# Patient Record
Sex: MALE | Race: WHITE | NOT HISPANIC OR LATINO | Employment: OTHER | URBAN - METROPOLITAN AREA
[De-identification: names, ages, dates, MRNs, and addresses within clinical notes are randomized per-mention and may not be internally consistent; named-entity substitution may affect disease eponyms.]

---

## 2017-03-05 ENCOUNTER — GENERIC CONVERSION - ENCOUNTER (OUTPATIENT)
Dept: OTHER | Facility: OTHER | Age: 82
End: 2017-03-05

## 2017-04-03 ENCOUNTER — ALLSCRIPTS OFFICE VISIT (OUTPATIENT)
Dept: OTHER | Facility: OTHER | Age: 82
End: 2017-04-03

## 2017-04-03 DIAGNOSIS — C61 MALIGNANT NEOPLASM OF PROSTATE (HCC): ICD-10-CM

## 2017-04-03 LAB
CLARITY UR: NORMAL
COLOR UR: YELLOW
GLUCOSE (HISTORICAL): NORMAL
HGB UR QL STRIP.AUTO: NORMAL
KETONES UR STRIP-MCNC: NORMAL MG/DL
LEUKOCYTE ESTERASE UR QL STRIP: NORMAL
NITRITE UR QL STRIP: NORMAL
PH UR STRIP.AUTO: 6 [PH]
PROT UR STRIP-MCNC: NORMAL MG/DL
SP GR UR STRIP.AUTO: 1.01

## 2017-04-20 ENCOUNTER — HOSPITAL ENCOUNTER (OUTPATIENT)
Dept: NUCLEAR MEDICINE | Facility: HOSPITAL | Age: 82
Discharge: HOME | End: 2017-04-20
Payer: MEDICARE

## 2017-04-20 ENCOUNTER — HOSPITAL ENCOUNTER (OUTPATIENT)
Dept: CT IMAGING | Facility: HOSPITAL | Age: 82
Discharge: HOME | End: 2017-04-20
Payer: MEDICARE

## 2017-04-20 DIAGNOSIS — C61 MALIGNANT NEOPLASM OF PROSTATE (HCC): ICD-10-CM

## 2017-04-20 PROCEDURE — 78306 BONE IMAGING WHOLE BODY: CPT

## 2017-04-20 PROCEDURE — 74177 CT ABD & PELVIS W/CONTRAST: CPT

## 2017-04-20 PROCEDURE — A9503 TC99M MEDRONATE: HCPCS

## 2017-04-20 PROCEDURE — 71260 CT THORAX DX C+: CPT

## 2017-04-20 RX ADMIN — IOHEXOL 100 ML: 350 INJECTION, SOLUTION INTRAVENOUS at 11:39

## 2017-05-10 ENCOUNTER — ALLSCRIPTS OFFICE VISIT (OUTPATIENT)
Dept: OTHER | Facility: OTHER | Age: 82
End: 2017-05-10

## 2017-05-13 ENCOUNTER — LAB CONVERSION - ENCOUNTER (OUTPATIENT)
Dept: OTHER | Facility: OTHER | Age: 82
End: 2017-05-13

## 2017-05-13 LAB
HBA1C MFR BLD HPLC: 5.6 % OF TOTAL HGB
TSH SERPL DL<=0.05 MIU/L-ACNC: 3.91 MIU/L (ref 0.4–4.5)

## 2017-05-16 ENCOUNTER — ALLSCRIPTS OFFICE VISIT (OUTPATIENT)
Dept: OTHER | Facility: OTHER | Age: 82
End: 2017-05-16

## 2017-07-03 DIAGNOSIS — C61 MALIGNANT NEOPLASM OF PROSTATE (HCC): ICD-10-CM

## 2017-07-18 ENCOUNTER — ALLSCRIPTS OFFICE VISIT (OUTPATIENT)
Dept: OTHER | Facility: OTHER | Age: 82
End: 2017-07-18

## 2017-08-09 ENCOUNTER — ALLSCRIPTS OFFICE VISIT (OUTPATIENT)
Dept: OTHER | Facility: OTHER | Age: 82
End: 2017-08-09

## 2017-08-09 DIAGNOSIS — C61 MALIGNANT NEOPLASM OF PROSTATE (HCC): ICD-10-CM

## 2017-08-10 ENCOUNTER — LAB CONVERSION - ENCOUNTER (OUTPATIENT)
Dept: OTHER | Facility: OTHER | Age: 82
End: 2017-08-10

## 2017-08-10 LAB
A/G RATIO (HISTORICAL): 1.6 (CALC) (ref 1–2.5)
ALBUMIN SERPL BCP-MCNC: 3.9 G/DL (ref 3.6–5.1)
ALP SERPL-CCNC: 106 U/L (ref 40–115)
ALT SERPL W P-5'-P-CCNC: 23 U/L (ref 9–46)
AST SERPL W P-5'-P-CCNC: 22 U/L (ref 10–35)
BASOPHILS # BLD AUTO: 0.8 %
BASOPHILS # BLD AUTO: 42 CELLS/UL (ref 0–200)
BILIRUB SERPL-MCNC: 0.5 MG/DL (ref 0.2–1.2)
BUN SERPL-MCNC: 21 MG/DL (ref 7–25)
BUN/CREA RATIO (HISTORICAL): ABNORMAL (CALC) (ref 6–22)
CALCIUM SERPL-MCNC: 9.2 MG/DL (ref 8.6–10.3)
CHLORIDE SERPL-SCNC: 105 MMOL/L (ref 98–110)
CO2 SERPL-SCNC: 27 MMOL/L (ref 20–31)
CREAT SERPL-MCNC: 0.96 MG/DL (ref 0.7–1.11)
DEPRECATED RDW RBC AUTO: 12.9 % (ref 11–15)
EGFR AFRICAN AMERICAN (HISTORICAL): 86 ML/MIN/1.73M2
EGFR-AMERICAN CALC (HISTORICAL): 74 ML/MIN/1.73M2
EOSINOPHIL # BLD AUTO: 260 CELLS/UL (ref 15–500)
EOSINOPHIL # BLD AUTO: 4.9 %
GAMMA GLOBULIN (HISTORICAL): 2.4 G/DL (CALC) (ref 1.9–3.7)
GLUCOSE (HISTORICAL): 109 MG/DL (ref 65–99)
HCT VFR BLD AUTO: 38.5 % (ref 38.5–50)
HGB BLD-MCNC: 12.7 G/DL (ref 13.2–17.1)
LYMPHOCYTES # BLD AUTO: 1622 CELLS/UL (ref 850–3900)
LYMPHOCYTES # BLD AUTO: 30.6 %
MCH RBC QN AUTO: 30.6 PG (ref 27–33)
MCHC RBC AUTO-ENTMCNC: 33 G/DL (ref 32–36)
MCV RBC AUTO: 92.8 FL (ref 80–100)
MONOCYTES # BLD AUTO: 530 CELLS/UL (ref 200–950)
MONOCYTES (HISTORICAL): 10 %
NEUTROPHILS # BLD AUTO: 2846 CELLS/UL (ref 1500–7800)
NEUTROPHILS # BLD AUTO: 53.7 %
PLATELET # BLD AUTO: 193 THOUSAND/UL (ref 140–400)
PMV BLD AUTO: 9.7 FL (ref 7.5–12.5)
POTASSIUM SERPL-SCNC: 4.7 MMOL/L (ref 3.5–5.3)
RBC # BLD AUTO: 4.15 MILLION/UL (ref 4.2–5.8)
SODIUM SERPL-SCNC: 138 MMOL/L (ref 135–146)
TOTAL PROTEIN (HISTORICAL): 6.3 G/DL (ref 6.1–8.1)
WBC # BLD AUTO: 5.3 THOUSAND/UL (ref 3.8–10.8)

## 2017-08-11 ENCOUNTER — LAB CONVERSION - ENCOUNTER (OUTPATIENT)
Dept: OTHER | Facility: OTHER | Age: 82
End: 2017-08-11

## 2017-08-11 LAB
A/G RATIO (HISTORICAL): 1.6 (CALC) (ref 1–2.5)
ALBUMIN SERPL BCP-MCNC: 3.9 G/DL (ref 3.6–5.1)
ALP SERPL-CCNC: 106 U/L (ref 40–115)
ALT SERPL W P-5'-P-CCNC: 23 U/L (ref 9–46)
AST SERPL W P-5'-P-CCNC: 22 U/L (ref 10–35)
BASOPHILS # BLD AUTO: 0.8 %
BASOPHILS # BLD AUTO: 42 CELLS/UL (ref 0–200)
BILIRUB SERPL-MCNC: 0.5 MG/DL (ref 0.2–1.2)
BUN SERPL-MCNC: 21 MG/DL (ref 7–25)
BUN/CREA RATIO (HISTORICAL): ABNORMAL (CALC) (ref 6–22)
CALCIUM SERPL-MCNC: 9.2 MG/DL (ref 8.6–10.3)
CHLORIDE SERPL-SCNC: 105 MMOL/L (ref 98–110)
CO2 SERPL-SCNC: 27 MMOL/L (ref 20–31)
CREAT SERPL-MCNC: 0.96 MG/DL (ref 0.7–1.11)
DEPRECATED RDW RBC AUTO: 12.9 % (ref 11–15)
EGFR AFRICAN AMERICAN (HISTORICAL): 86 ML/MIN/1.73M2
EGFR-AMERICAN CALC (HISTORICAL): 74 ML/MIN/1.73M2
EOSINOPHIL # BLD AUTO: 260 CELLS/UL (ref 15–500)
EOSINOPHIL # BLD AUTO: 4.9 %
GAMMA GLOBULIN (HISTORICAL): 2.4 G/DL (CALC) (ref 1.9–3.7)
GLUCOSE (HISTORICAL): 109 MG/DL (ref 65–99)
HCT VFR BLD AUTO: 38.5 % (ref 38.5–50)
HGB BLD-MCNC: 12.7 G/DL (ref 13.2–17.1)
LYMPHOCYTES # BLD AUTO: 1622 CELLS/UL (ref 850–3900)
LYMPHOCYTES # BLD AUTO: 30.6 %
MCH RBC QN AUTO: 30.6 PG (ref 27–33)
MCHC RBC AUTO-ENTMCNC: 33 G/DL (ref 32–36)
MCV RBC AUTO: 92.8 FL (ref 80–100)
MONOCYTES # BLD AUTO: 530 CELLS/UL (ref 200–950)
MONOCYTES (HISTORICAL): 10 %
NEUTROPHILS # BLD AUTO: 2846 CELLS/UL (ref 1500–7800)
NEUTROPHILS # BLD AUTO: 53.7 %
PLATELET # BLD AUTO: 193 THOUSAND/UL (ref 140–400)
PMV BLD AUTO: 9.7 FL (ref 7.5–12.5)
POTASSIUM SERPL-SCNC: 4.7 MMOL/L (ref 3.5–5.3)
PROSTATE SPECIFIC ANTIGEN TOTAL (HISTORICAL): 6.9 NG/ML
RBC # BLD AUTO: 4.15 MILLION/UL (ref 4.2–5.8)
SODIUM SERPL-SCNC: 138 MMOL/L (ref 135–146)
TOTAL PROTEIN (HISTORICAL): 6.3 G/DL (ref 6.1–8.1)
WBC # BLD AUTO: 5.3 THOUSAND/UL (ref 3.8–10.8)

## 2017-08-16 ENCOUNTER — HOSPITAL ENCOUNTER (OUTPATIENT)
Dept: INFUSION CENTER | Facility: CLINIC | Age: 82
Discharge: HOME | End: 2017-08-16
Payer: MEDICARE

## 2017-08-16 PROCEDURE — 96401 CHEMO ANTI-NEOPL SQ/IM: CPT

## 2017-08-16 RX ORDER — DORZOLAMIDE HCL 20 MG/ML
1 SOLUTION/ DROPS OPHTHALMIC 3 TIMES DAILY
COMMUNITY

## 2017-08-16 RX ORDER — VIT C/E/CUPERIC/ZINC/LUTEIN 226-90-0.8
CAPSULE ORAL DAILY
COMMUNITY

## 2017-08-16 RX ORDER — ASPIRIN 81 MG/1
81 TABLET ORAL DAILY
COMMUNITY
End: 2018-01-01

## 2017-08-16 RX ORDER — MELATONIN
1000 DAILY
COMMUNITY

## 2017-08-16 RX ORDER — LISINOPRIL 20 MG/1
10 TABLET ORAL DAILY
COMMUNITY
End: 2019-01-01

## 2017-08-16 RX ORDER — TRAVOPROST OPHTHALMIC SOLUTION 0.04 MG/ML
1 SOLUTION OPHTHALMIC
COMMUNITY

## 2017-08-16 RX ORDER — BRINZOLAMIDE 10 MG/ML
1 SUSPENSION/ DROPS OPHTHALMIC 3 TIMES DAILY
COMMUNITY
End: 2018-06-26

## 2017-08-16 RX ORDER — TRIAMCINOLONE ACETONIDE 1 MG/ML
LOTION TOPICAL AS NEEDED
COMMUNITY
End: 2018-06-26 | Stop reason: ALTCHOICE

## 2017-08-16 RX ORDER — BRIMONIDINE TARTRATE, TIMOLOL MALEATE 2; 5 MG/ML; MG/ML
1 SOLUTION/ DROPS OPHTHALMIC EVERY 12 HOURS SCHEDULED
COMMUNITY

## 2017-08-16 RX ORDER — DIPHENOXYLATE HYDROCHLORIDE AND ATROPINE SULFATE 2.5; .025 MG/1; MG/1
1 TABLET ORAL DAILY
COMMUNITY

## 2017-08-16 RX ADMIN — DENOSUMAB 120 MG: 120 INJECTION SUBCUTANEOUS at 10:47

## 2017-08-16 NOTE — PROGRESS NOTES
Pt here for first Xgeva injection  Serum Ca = 9 2  Injection given in L arm  AVS given, pt aware of next appointment

## 2017-08-21 ENCOUNTER — GENERIC CONVERSION - ENCOUNTER (OUTPATIENT)
Dept: OTHER | Facility: OTHER | Age: 82
End: 2017-08-21

## 2017-09-08 ENCOUNTER — LAB CONVERSION - ENCOUNTER (OUTPATIENT)
Dept: OTHER | Facility: OTHER | Age: 82
End: 2017-09-08

## 2017-09-08 LAB
A/G RATIO (HISTORICAL): 1.4 (CALC) (ref 1–2.5)
ALBUMIN SERPL BCP-MCNC: 3.9 G/DL (ref 3.6–5.1)
ALP SERPL-CCNC: 97 U/L (ref 40–115)
ALT SERPL W P-5'-P-CCNC: 23 U/L (ref 9–46)
AST SERPL W P-5'-P-CCNC: 23 U/L (ref 10–35)
BASOPHILS # BLD AUTO: 0.7 %
BASOPHILS # BLD AUTO: 32 CELLS/UL (ref 0–200)
BILIRUB SERPL-MCNC: 0.4 MG/DL (ref 0.2–1.2)
BUN SERPL-MCNC: 20 MG/DL (ref 7–25)
BUN/CREA RATIO (HISTORICAL): ABNORMAL (CALC) (ref 6–22)
CALCIUM SERPL-MCNC: 9.1 MG/DL (ref 8.6–10.3)
CHLORIDE SERPL-SCNC: 107 MMOL/L (ref 98–110)
CO2 SERPL-SCNC: 28 MMOL/L (ref 20–31)
CREAT SERPL-MCNC: 0.89 MG/DL (ref 0.7–1.11)
DEPRECATED RDW RBC AUTO: 12.4 % (ref 11–15)
EGFR AFRICAN AMERICAN (HISTORICAL): 93 ML/MIN/1.73M2
EGFR-AMERICAN CALC (HISTORICAL): 80 ML/MIN/1.73M2
EOSINOPHIL # BLD AUTO: 257 CELLS/UL (ref 15–500)
EOSINOPHIL # BLD AUTO: 5.7 %
GAMMA GLOBULIN (HISTORICAL): 2.7 G/DL (CALC) (ref 1.9–3.7)
GLUCOSE (HISTORICAL): 126 MG/DL (ref 65–99)
HCT VFR BLD AUTO: 40.5 % (ref 38.5–50)
HGB BLD-MCNC: 13.3 G/DL (ref 13.2–17.1)
LYMPHOCYTES # BLD AUTO: 1391 CELLS/UL (ref 850–3900)
LYMPHOCYTES # BLD AUTO: 30.9 %
MCH RBC QN AUTO: 30.4 PG (ref 27–33)
MCHC RBC AUTO-ENTMCNC: 32.8 G/DL (ref 32–36)
MCV RBC AUTO: 92.5 FL (ref 80–100)
MONOCYTES # BLD AUTO: 347 CELLS/UL (ref 200–950)
MONOCYTES (HISTORICAL): 7.7 %
NEUTROPHILS # BLD AUTO: 2475 CELLS/UL (ref 1500–7800)
NEUTROPHILS # BLD AUTO: 55 %
PLATELET # BLD AUTO: 208 THOUSAND/UL (ref 140–400)
PMV BLD AUTO: 10.1 FL (ref 7.5–12.5)
POTASSIUM SERPL-SCNC: 4.2 MMOL/L (ref 3.5–5.3)
PROSTATE SPECIFIC ANTIGEN TOTAL (HISTORICAL): 5.5 NG/ML
RBC # BLD AUTO: 4.38 MILLION/UL (ref 4.2–5.8)
SODIUM SERPL-SCNC: 141 MMOL/L (ref 135–146)
TOTAL PROTEIN (HISTORICAL): 6.6 G/DL (ref 6.1–8.1)
WBC # BLD AUTO: 4.5 THOUSAND/UL (ref 3.8–10.8)

## 2017-09-13 ENCOUNTER — HOSPITAL ENCOUNTER (OUTPATIENT)
Dept: INFUSION CENTER | Facility: CLINIC | Age: 82
Discharge: HOME | End: 2017-09-13
Payer: MEDICARE

## 2017-09-13 PROCEDURE — 96401 CHEMO ANTI-NEOPL SQ/IM: CPT

## 2017-09-13 RX ADMIN — DENOSUMAB 120 MG: 120 INJECTION SUBCUTANEOUS at 10:37

## 2017-09-13 NOTE — PLAN OF CARE
Problem: SAFETY ADULT  Goal: Patient will remain free of falls  INTERVENTIONS:  - Assess patient frequently for physical needs  -  Identify cognitive and physical deficits and behaviors that affect risk of falls    -  [place] fall precautions as indicated by assessment   - Educate patient/family on patient safety including physical limitations  - Instruct patient to call for assistance with activity based on assessment  - Modify environment to reduce risk of injury  - Consider OT/PT consult to assist with strengthening/mobility  Outcome: Progressing    Goal: Maintain or return to baseline ADL function  INTERVENTIONS:  -  Assess patient's ability to carry out ADLs; assess patient's baseline for ADL function and identify physical deficits which impact ability to perform ADLs (bathing, care of mouth/teeth, toileting, grooming, dressing, etc )  - Assess/evaluate cause of self-care deficits   - Assess range of motion  - Assess patient's mobility; develop plan if impaired  - Assess patient's need for assistive devices and provide as appropriate  - Encourage maximum independence but intervene and supervise when necessary  ¯ Involve family in performance of ADLs  ¯ Assess for home care needs following discharge   ¯ Request OT consult to assist with ADL evaluation and planning for discharge  ¯ Provide patient education as appropriate  Outcome: Progressing    Goal: Maintain or return mobility status to optimal level  INTERVENTIONS:  - Assess patient's baseline mobility status (ambulation, transfers, stairs, etc )    - Identify cognitive and physical deficits and behaviors that affect mobility  - Identify mobility aids required to assist with transfers and/or ambulation (gait belt, sit-to-stand, lift, walker, cane, etc )  - [place] fall precautions as indicated by assessment  - Record patient progress and toleration of activity level on Mobility SBAR; progress patient to next Phase/Stage  - Instruct patient to call for assistance with activity based on assessment  - Request Rehabilitation consult to assist with strengthening/weightbearing, etc   Outcome: Progressing

## 2017-09-13 NOTE — PROGRESS NOTES
Pt received Xgeva without complications  Denies any jaw/mouth issues  Denies recent or scheduled dental work  Aware of future appointments

## 2017-09-20 ENCOUNTER — ALLSCRIPTS OFFICE VISIT (OUTPATIENT)
Dept: OTHER | Facility: OTHER | Age: 82
End: 2017-09-20

## 2017-10-01 DIAGNOSIS — I12.9 HYPERTENSIVE CHRONIC KIDNEY DISEASE WITH STAGE 1 THROUGH STAGE 4 CHRONIC KIDNEY DISEASE, OR UNSPECIFIED CHRONIC KIDNEY DISEASE: ICD-10-CM

## 2017-10-01 DIAGNOSIS — R79.89 OTHER SPECIFIED ABNORMAL FINDINGS OF BLOOD CHEMISTRY: ICD-10-CM

## 2017-10-01 DIAGNOSIS — E78.1 PURE HYPERGLYCERIDEMIA: ICD-10-CM

## 2017-10-01 DIAGNOSIS — N18.2 CHRONIC KIDNEY DISEASE, STAGE II (MILD): ICD-10-CM

## 2017-10-01 DIAGNOSIS — R73.09 OTHER ABNORMAL GLUCOSE: ICD-10-CM

## 2017-10-01 DIAGNOSIS — I10 ESSENTIAL (PRIMARY) HYPERTENSION: ICD-10-CM

## 2017-10-01 DIAGNOSIS — C61 MALIGNANT NEOPLASM OF PROSTATE (HCC): ICD-10-CM

## 2017-10-05 ENCOUNTER — LAB CONVERSION - ENCOUNTER (OUTPATIENT)
Dept: OTHER | Facility: OTHER | Age: 82
End: 2017-10-05

## 2017-10-05 LAB
A/G RATIO (HISTORICAL): 1.5 (CALC) (ref 1–2.5)
ALBUMIN SERPL BCP-MCNC: 4.1 G/DL (ref 3.6–5.1)
ALP SERPL-CCNC: 76 U/L (ref 40–115)
ALT SERPL W P-5'-P-CCNC: 23 U/L (ref 9–46)
AST SERPL W P-5'-P-CCNC: 21 U/L (ref 10–35)
BASOPHILS # BLD AUTO: 0.7 %
BASOPHILS # BLD AUTO: 30 CELLS/UL (ref 0–200)
BILIRUB SERPL-MCNC: 0.5 MG/DL (ref 0.2–1.2)
BUN SERPL-MCNC: 22 MG/DL (ref 7–25)
BUN/CREA RATIO (HISTORICAL): ABNORMAL (CALC) (ref 6–22)
CALCIUM SERPL-MCNC: 9.1 MG/DL (ref 8.6–10.3)
CHLORIDE SERPL-SCNC: 106 MMOL/L (ref 98–110)
CHOLEST SERPL-MCNC: 231 MG/DL
CHOLEST/HDLC SERPL: 5.4 (CALC)
CO2 SERPL-SCNC: 28 MMOL/L (ref 20–31)
CREAT SERPL-MCNC: 0.93 MG/DL (ref 0.7–1.11)
DEPRECATED RDW RBC AUTO: 12.4 % (ref 11–15)
EGFR AFRICAN AMERICAN (HISTORICAL): 89 ML/MIN/1.73M2
EGFR-AMERICAN CALC (HISTORICAL): 77 ML/MIN/1.73M2
EOSINOPHIL # BLD AUTO: 280 CELLS/UL (ref 15–500)
EOSINOPHIL # BLD AUTO: 6.5 %
GAMMA GLOBULIN (HISTORICAL): 2.7 G/DL (CALC) (ref 1.9–3.7)
GLUCOSE (HISTORICAL): 104 MG/DL (ref 65–99)
HBA1C MFR BLD HPLC: 5.6 % OF TOTAL HGB
HCT VFR BLD AUTO: 39.5 % (ref 38.5–50)
HDLC SERPL-MCNC: 43 MG/DL
HGB BLD-MCNC: 13.3 G/DL (ref 13.2–17.1)
LYMPHOCYTES # BLD AUTO: 1273 CELLS/UL (ref 850–3900)
LYMPHOCYTES # BLD AUTO: 29.6 %
MCH RBC QN AUTO: 31.1 PG (ref 27–33)
MCHC RBC AUTO-ENTMCNC: 33.7 G/DL (ref 32–36)
MCV RBC AUTO: 92.3 FL (ref 80–100)
MONOCYTES # BLD AUTO: 391 CELLS/UL (ref 200–950)
MONOCYTES (HISTORICAL): 9.1 %
NEUTROPHILS # BLD AUTO: 2326 CELLS/UL (ref 1500–7800)
NEUTROPHILS # BLD AUTO: 54.1 %
NON-HDL-CHOL (CHOL-HDL) (HISTORICAL): 188 MG/DL (CALC)
PLATELET # BLD AUTO: 182 THOUSAND/UL (ref 140–400)
PMV BLD AUTO: 9.8 FL (ref 7.5–12.5)
POTASSIUM SERPL-SCNC: 4.7 MMOL/L (ref 3.5–5.3)
PROSTATE SPECIFIC ANTIGEN TOTAL (HISTORICAL): 5.8 NG/ML
RBC # BLD AUTO: 4.28 MILLION/UL (ref 4.2–5.8)
SODIUM SERPL-SCNC: 139 MMOL/L (ref 135–146)
T4 FREE SERPL-MCNC: 1.2 NG/DL (ref 0.8–1.8)
TOTAL PROTEIN (HISTORICAL): 6.8 G/DL (ref 6.1–8.1)
TRIGL SERPL-MCNC: 435 MG/DL
TSH SERPL DL<=0.05 MIU/L-ACNC: 4.16 MIU/L (ref 0.4–4.5)
WBC # BLD AUTO: 4.3 THOUSAND/UL (ref 3.8–10.8)

## 2017-10-11 ENCOUNTER — HOSPITAL ENCOUNTER (OUTPATIENT)
Dept: INFUSION CENTER | Facility: CLINIC | Age: 82
Discharge: HOME | End: 2017-10-11
Payer: MEDICARE

## 2017-10-11 VITALS
HEART RATE: 67 BPM | DIASTOLIC BLOOD PRESSURE: 85 MMHG | TEMPERATURE: 98.1 F | SYSTOLIC BLOOD PRESSURE: 149 MMHG | RESPIRATION RATE: 18 BRPM

## 2017-10-11 PROCEDURE — 96401 CHEMO ANTI-NEOPL SQ/IM: CPT

## 2017-10-11 RX ADMIN — DENOSUMAB 120 MG: 120 INJECTION SUBCUTANEOUS at 13:24

## 2017-10-11 NOTE — PROGRESS NOTES
Patient's Calcium from 10/4/17 is 9 1 which meets parameters for Xgeva, patient denies any recent dental problems or procedures scheduled  Patient declines AVS today, aware of next appointment

## 2017-10-17 ENCOUNTER — GENERIC CONVERSION - ENCOUNTER (OUTPATIENT)
Dept: OTHER | Facility: OTHER | Age: 82
End: 2017-10-17

## 2017-10-20 ENCOUNTER — ALLSCRIPTS OFFICE VISIT (OUTPATIENT)
Dept: OTHER | Facility: OTHER | Age: 82
End: 2017-10-20

## 2017-10-20 ENCOUNTER — GENERIC CONVERSION - ENCOUNTER (OUTPATIENT)
Dept: OTHER | Facility: OTHER | Age: 82
End: 2017-10-20

## 2017-11-01 DIAGNOSIS — C61 MALIGNANT NEOPLASM OF PROSTATE (HCC): ICD-10-CM

## 2017-11-02 ENCOUNTER — LAB CONVERSION - ENCOUNTER (OUTPATIENT)
Dept: OTHER | Facility: OTHER | Age: 82
End: 2017-11-02

## 2017-11-02 LAB
A/G RATIO (HISTORICAL): 1.6 (CALC) (ref 1–2.5)
ALBUMIN SERPL BCP-MCNC: 4.1 G/DL (ref 3.6–5.1)
ALP SERPL-CCNC: 64 U/L (ref 40–115)
ALT SERPL W P-5'-P-CCNC: 26 U/L (ref 9–46)
AST SERPL W P-5'-P-CCNC: 22 U/L (ref 10–35)
BILIRUB SERPL-MCNC: 0.5 MG/DL (ref 0.2–1.2)
BUN SERPL-MCNC: 20 MG/DL (ref 7–25)
BUN/CREA RATIO (HISTORICAL): NORMAL (CALC) (ref 6–22)
CALCIUM SERPL-MCNC: 9 MG/DL (ref 8.6–10.3)
CHLORIDE SERPL-SCNC: 108 MMOL/L (ref 98–110)
CO2 SERPL-SCNC: 26 MMOL/L (ref 20–31)
CREAT SERPL-MCNC: 0.97 MG/DL (ref 0.7–1.11)
EGFR AFRICAN AMERICAN (HISTORICAL): 85 ML/MIN/1.73M2
EGFR-AMERICAN CALC (HISTORICAL): 73 ML/MIN/1.73M2
GAMMA GLOBULIN (HISTORICAL): 2.6 G/DL (CALC) (ref 1.9–3.7)
GLUCOSE (HISTORICAL): 99 MG/DL (ref 65–99)
POTASSIUM SERPL-SCNC: 4.8 MMOL/L (ref 3.5–5.3)
SODIUM SERPL-SCNC: 141 MMOL/L (ref 135–146)
TOTAL PROTEIN (HISTORICAL): 6.7 G/DL (ref 6.1–8.1)

## 2017-11-03 ENCOUNTER — LAB CONVERSION - ENCOUNTER (OUTPATIENT)
Dept: OTHER | Facility: OTHER | Age: 82
End: 2017-11-03

## 2017-11-03 LAB
A/G RATIO (HISTORICAL): 1.6 (CALC) (ref 1–2.5)
ALBUMIN SERPL BCP-MCNC: 4.1 G/DL (ref 3.6–5.1)
ALP SERPL-CCNC: 64 U/L (ref 40–115)
ALT SERPL W P-5'-P-CCNC: 26 U/L (ref 9–46)
AST SERPL W P-5'-P-CCNC: 22 U/L (ref 10–35)
BASOPHILS # BLD AUTO: 0.4 %
BASOPHILS # BLD AUTO: 18 CELLS/UL (ref 0–200)
BILIRUB SERPL-MCNC: 0.5 MG/DL (ref 0.2–1.2)
BUN SERPL-MCNC: 20 MG/DL (ref 7–25)
BUN/CREA RATIO (HISTORICAL): NORMAL (CALC) (ref 6–22)
CALCIUM SERPL-MCNC: 9 MG/DL (ref 8.6–10.3)
CHLORIDE SERPL-SCNC: 108 MMOL/L (ref 98–110)
CO2 SERPL-SCNC: 26 MMOL/L (ref 20–31)
CREAT SERPL-MCNC: 0.97 MG/DL (ref 0.7–1.11)
DEPRECATED RDW RBC AUTO: 12.6 % (ref 11–15)
EGFR AFRICAN AMERICAN (HISTORICAL): 85 ML/MIN/1.73M2
EGFR-AMERICAN CALC (HISTORICAL): 73 ML/MIN/1.73M2
EOSINOPHIL # BLD AUTO: 221 CELLS/UL (ref 15–500)
EOSINOPHIL # BLD AUTO: 4.8 %
GAMMA GLOBULIN (HISTORICAL): 2.6 G/DL (CALC) (ref 1.9–3.7)
GLUCOSE (HISTORICAL): 99 MG/DL (ref 65–99)
HCT VFR BLD AUTO: 38.9 % (ref 38.5–50)
HGB BLD-MCNC: 13.3 G/DL (ref 13.2–17.1)
LYMPHOCYTES # BLD AUTO: 1546 CELLS/UL (ref 850–3900)
LYMPHOCYTES # BLD AUTO: 33.6 %
MCH RBC QN AUTO: 31.6 PG (ref 27–33)
MCHC RBC AUTO-ENTMCNC: 34.2 G/DL (ref 32–36)
MCV RBC AUTO: 92.4 FL (ref 80–100)
MONOCYTES # BLD AUTO: 460 CELLS/UL (ref 200–950)
MONOCYTES (HISTORICAL): 10 %
NEUTROPHILS # BLD AUTO: 2355 CELLS/UL (ref 1500–7800)
NEUTROPHILS # BLD AUTO: 51.2 %
PLATELET # BLD AUTO: 200 THOUSAND/UL (ref 140–400)
PMV BLD AUTO: 10.2 FL (ref 7.5–12.5)
POTASSIUM SERPL-SCNC: 4.8 MMOL/L (ref 3.5–5.3)
PROSTATE SPECIFIC ANTIGEN TOTAL (HISTORICAL): 5.6 NG/ML
RBC # BLD AUTO: 4.21 MILLION/UL (ref 4.2–5.8)
SODIUM SERPL-SCNC: 141 MMOL/L (ref 135–146)
TOTAL PROTEIN (HISTORICAL): 6.7 G/DL (ref 6.1–8.1)
WBC # BLD AUTO: 4.6 THOUSAND/UL (ref 3.8–10.8)

## 2017-11-08 ENCOUNTER — HOSPITAL ENCOUNTER (OUTPATIENT)
Dept: INFUSION CENTER | Facility: CLINIC | Age: 82
Discharge: HOME | End: 2017-11-08
Payer: MEDICARE

## 2017-11-08 VITALS
DIASTOLIC BLOOD PRESSURE: 91 MMHG | RESPIRATION RATE: 16 BRPM | TEMPERATURE: 97.4 F | SYSTOLIC BLOOD PRESSURE: 165 MMHG | HEART RATE: 58 BPM

## 2017-11-08 PROCEDURE — 96402 CHEMO HORMON ANTINEOPL SQ/IM: CPT

## 2017-11-08 RX ADMIN — DENOSUMAB 120 MG: 120 INJECTION SUBCUTANEOUS at 13:33

## 2017-11-08 NOTE — PROGRESS NOTES
Ca level 9 0  Denies any recent or scheduled dental work and jaw issues  Received Xgeva without complications  Refused AVS  Aware of future appointments

## 2017-12-01 ENCOUNTER — LAB CONVERSION - ENCOUNTER (OUTPATIENT)
Dept: OTHER | Facility: OTHER | Age: 82
End: 2017-12-01

## 2017-12-01 LAB
A/G RATIO (HISTORICAL): 1.5 (CALC) (ref 1–2.5)
ALBUMIN SERPL BCP-MCNC: 3.9 G/DL (ref 3.6–5.1)
ALP SERPL-CCNC: 67 U/L (ref 40–115)
ALT SERPL W P-5'-P-CCNC: 19 U/L (ref 9–46)
AST SERPL W P-5'-P-CCNC: 18 U/L (ref 10–35)
BASOPHILS # BLD AUTO: 0.7 %
BASOPHILS # BLD AUTO: 31 CELLS/UL (ref 0–200)
BILIRUB SERPL-MCNC: 0.5 MG/DL (ref 0.2–1.2)
BUN SERPL-MCNC: 20 MG/DL (ref 7–25)
BUN/CREA RATIO (HISTORICAL): ABNORMAL (CALC) (ref 6–22)
CALCIUM SERPL-MCNC: 8.7 MG/DL (ref 8.6–10.3)
CHLORIDE SERPL-SCNC: 106 MMOL/L (ref 98–110)
CO2 SERPL-SCNC: 22 MMOL/L (ref 20–31)
CREAT SERPL-MCNC: 0.82 MG/DL (ref 0.7–1.11)
DEPRECATED RDW RBC AUTO: 12.6 % (ref 11–15)
EGFR AFRICAN AMERICAN (HISTORICAL): 96 ML/MIN/1.73M2
EGFR-AMERICAN CALC (HISTORICAL): 83 ML/MIN/1.73M2
EOSINOPHIL # BLD AUTO: 233 CELLS/UL (ref 15–500)
EOSINOPHIL # BLD AUTO: 5.3 %
GAMMA GLOBULIN (HISTORICAL): 2.6 G/DL (CALC) (ref 1.9–3.7)
GLUCOSE (HISTORICAL): 156 MG/DL (ref 65–99)
HCT VFR BLD AUTO: 38.2 % (ref 38.5–50)
HGB BLD-MCNC: 12.8 G/DL (ref 13.2–17.1)
LYMPHOCYTES # BLD AUTO: 1377 CELLS/UL (ref 850–3900)
LYMPHOCYTES # BLD AUTO: 31.3 %
MCH RBC QN AUTO: 31.3 PG (ref 27–33)
MCHC RBC AUTO-ENTMCNC: 33.5 G/DL (ref 32–36)
MCV RBC AUTO: 93.4 FL (ref 80–100)
MONOCYTES # BLD AUTO: 282 CELLS/UL (ref 200–950)
MONOCYTES (HISTORICAL): 6.4 %
NEUTROPHILS # BLD AUTO: 2477 CELLS/UL (ref 1500–7800)
NEUTROPHILS # BLD AUTO: 56.3 %
PLATELET # BLD AUTO: 200 THOUSAND/UL (ref 140–400)
PMV BLD AUTO: 10.1 FL (ref 7.5–12.5)
POTASSIUM SERPL-SCNC: 4.1 MMOL/L (ref 3.5–5.3)
PROSTATE SPECIFIC ANTIGEN TOTAL (HISTORICAL): 5 NG/ML
RBC # BLD AUTO: 4.09 MILLION/UL (ref 4.2–5.8)
SODIUM SERPL-SCNC: 137 MMOL/L (ref 135–146)
TOTAL PROTEIN (HISTORICAL): 6.5 G/DL (ref 6.1–8.1)
WBC # BLD AUTO: 4.4 THOUSAND/UL (ref 3.8–10.8)

## 2017-12-06 ENCOUNTER — HOSPITAL ENCOUNTER (OUTPATIENT)
Dept: INFUSION CENTER | Facility: CLINIC | Age: 82
Discharge: HOME | End: 2017-12-06
Payer: MEDICARE

## 2017-12-06 VITALS
RESPIRATION RATE: 18 BRPM | SYSTOLIC BLOOD PRESSURE: 167 MMHG | HEART RATE: 57 BPM | TEMPERATURE: 96.6 F | DIASTOLIC BLOOD PRESSURE: 94 MMHG

## 2017-12-06 PROCEDURE — 96401 CHEMO ANTI-NEOPL SQ/IM: CPT

## 2017-12-06 RX ADMIN — DENOSUMAB 120 MG: 120 INJECTION SUBCUTANEOUS at 14:02

## 2017-12-20 ENCOUNTER — ALLSCRIPTS OFFICE VISIT (OUTPATIENT)
Dept: OTHER | Facility: OTHER | Age: 82
End: 2017-12-20

## 2017-12-21 NOTE — PROGRESS NOTES
Assessment   1  Prostate cancer (185) (C61)   2  Bone metastases (198 5) (C79 51)    Plan   Prostate cancer    · Drink plenty of fluids ; Status:Complete;   Done: 46BIS0787   Ordered; For:Prostate cancer; Ordered By:Proothi, Carlos;   · (1) CBC/PLT/DIFF; Status:Active; Requested [id number]; Perform:[facility]; QND:90HYJ9646; Last Updated By:Silvia Madrid; 2017 9:56:45 AM;Ordered; For:Prostate cancer; Ordered By:Proothi, Carlos;   · (1) CBC/PLT/DIFF; Status:Active; Requested for:01Ost7527; Perform:[facility]; RRX:21CTY5443; Last Updated By:Silvia Madrid; 2017 9:55:56 AM;Ordered; For:Prostate cancer; Ordered By:Proothi, Carlos;   · (1) CBC/PLT/DIFF; Status:Active; Requested for:2018; Perform:[facility]; IZW:60YXZ9006; Last Updated By:Silvia Madrid; 2017 9:56:08 AM;Ordered; For:Prostate cancer; Ordered By:Proothi, Carlos;   · (1) CBC/PLT/DIFF; Status:Active; Requested for:32Obw3221; Perform:[facility]; TVA:80WHS8401; Last Updated By:Silvia Madrid; 2017 9:56:31 AM;Ordered; For:Prostate cancer; Ordered By:Proothi, Carlos;   · (1) CBC/PLT/DIFF; Status:Active; Requested VJV:29HDV5061; Perform:[facility]; EDITH:48ZFV1854; Last Updated By:Silvia Madrid; 2017 9:55:36 AM;Ordered; For:Prostate cancer; Ordered By:Proothi, Carlos;   · (1) CBC/PLT/DIFF; Status:Active; Requested for:2018; Perform:[facility]; Due:2019; Last Updated By:Silvia Madrid; 2017 9:56:19 AM;Ordered; For:Prostate cancer; Ordered By:Carlos Spain;   · (1) CBC/PLT/DIFF; Status:Complete; Requested for:Recurring Schedule: 2018;    2018; 3/26/2018; 2018; 2018; 2018 ; Perform:[facility]; QMQ:87LJV1831;EUSUUZO; For:Prostate cancer; Ordered By:Carlos Spain;   · (1) COMPREHENSIVE METABOLIC PANEL; Status:Active; Requested DLS:[id number];     Perform:[facility]; VAD:22QDA0480; Last Updated By:Yumi Madrid; 12/20/2017 9:56:45 AM;Ordered; For:Prostate cancer; Ordered By:Proothi, Carlos;   · (1) COMPREHENSIVE METABOLIC PANEL; Status:Active; Requested for:08Rcn1187; Perform:[facility]; XMB:81ESF4724; Last Updated By:Yumi Madrid; 12/20/2017 9:55:56 AM;Ordered; For:Prostate cancer; Ordered By:Proothi, Carlos;   · (1) COMPREHENSIVE METABOLIC PANEL; Status:Active; Requested for:26Mar2018; Perform:[facility]; ZPB:62LCE8381; Last Updated By:Yumi Madrid; 12/20/2017 9:56:08 AM;Ordered; For:Prostate cancer; Ordered By:Proothi, Carlos;   · (1) COMPREHENSIVE METABOLIC PANEL; Status:Active; Requested for:88Xbr0707; Perform:[facility]; BRQ:64JUM3286; Last Updated By:Yumi Madrid; 12/20/2017 9:56:31 AM;Ordered; For:Prostate cancer; Ordered By:Proothi, Carlos;   · (1) COMPREHENSIVE METABOLIC PANEL; Status:Active; Requested HWY:[id number]; Perform:[facility]; CIELO:14BGH9355; Last Updated By:Yumi Madrid; 12/20/2017 9:55:36 AM;Ordered; For:Prostate cancer; Ordered By:Proothi, Carlos;   · (1) COMPREHENSIVE METABOLIC PANEL; Status:Active; Requested for:30Apr2018; Perform:[facility]; Due:30Apr2019; Last Updated By:Yumi Madrid; 12/20/2017 9:56:19 AM;Ordered; For:Prostate cancer; Ordered By:Proothi, Carlos;   · (1) COMPREHENSIVE METABOLIC PANEL; Status:Complete; Requested for:Recurring    Schedule: 1/29/2018; 2/26/2018; 3/26/2018; 4/30/2018; 5/28/2018; 6/25/2018 ; Perform:[facility]; DXQ:83OQQ4455;UYYWXFN; For:Prostate cancer; Ordered By:Carlos Spain;   · Follow-up visit in 3 months Evaluation and Treatment  Follow-up  Status: Complete     Done: 34ETU6612 09:20AM   Ordered; For: Prostate cancer; Ordered By: Rick Vences Performed:  Due: 80YYH8310; Last Updated By: Korin Darby; 12/20/2017 9:45:32 AM    Discussion/Summary   Discussion Summary:       Pedrito follows with his urologist   examination and test results are as recorded and discussed  Patient has extensive bony metastases from prostate cancer  He does not have bone pains  Lupron Depot was recently started in April 2017  Haily De Anda PSA has come down to 5 0    He is responding to hormone therapy  was what else could have been be added to hormonal therapy  Previous studies showed Lupron plus Taxotere prolonged survival  New study showed Lupron plus ZYtiga +prednisone improved survival  Patient had decided not to have any additional therapy to hormonal therapy and he still feels the same way  He will continue to have bone strengthening medication Xgeva  He is already taking calcium and vitamin D  He will reserve other medications for later  We discussed Xgeva versus Zometa  We discussed once a month versus once every 3 months Xgeva versus Zometa  No study on Xgeva every 3 month  Patient would like to stay with Florina Mcgowan and he will take once every 3 months  He would prefer not to be switched to Zometa  Condition discussed and explained  Questions answered  will continue to Lupron shot from his urologist     Counseling Documentation With Imm: The patient, patient's family was counseled regarding diagnostic results,-- prognosis,-- patient and family education,-- impressions  total time of encounter was 30 minutes-- and-- 20 minutes was spent counseling  Goals and Barriers: The patient has the current Goals: To treat prostate cancer with metastasis  The patent has the current Barriers: No barriers  Patient's Capacity to Self-Care: Patient is able to Self-Care  Medication SE Review and Pt Understands Tx: Possible side effects of new medications were reviewed with the patient/guardian today  The treatment plan was reviewed with the patient/guardian   The patient/guardian understands and agrees with the treatment plan    Self Referrals:    Self Referrals: No      Chief Complaint   Chief Complaint Free Text Note Form: Cancer of prostate and extensive bone metastases  History of Present Illness   HPI: Patient is here with his wife  In 2011 patient was diagnosed to have cancer of the prostate and he had IMRT and ADT   this year in 2017 patient was found to have stage IV cancer prostate with extensive bony metastatic disease  In April 2017 he was started on Lupron depot injections once every 6 months  PSA Started to come down  He did not complain of bone pains    He was not interested in adding another medication like Zytiga plus prednisone or xofigo at this time  He went on X Geva with calcium and vitamin D  PSA has come down further to 5 0 from 9  He does not offer any symptom at this time  Georgette Ormond He follows with his urologist       Review of Systems             No headaches, seizures, diplopia, dysphagia, hoarseness, angina pain, chest pain, palpitations, shortness of breath, cough, hemoptysis, abdominal pain, melena, or hematuria  No fever, chills, bleeding, bone pains, skin rash, weight loss, nausea, vomiting and no change in bowel habits  Appetite is fair  No night sweats  Patient has minor arthritic symptoms  No leg cramps  Not unusually sensitive to heat or cold  No recent or frequent infections  Reviewed 13 systems  Other symptoms as in history of present illness  ROS Reviewed:    ROS reviewed  Active Problems   1  Abnormal TSH (790 6) (R94 6)   2  Acquired hypertriglyceridemia (272 1) (E78 1)   3  Benign essential hypertension (401 1) (I10)   4  Benign hypertensive CKD, stage 1-4 or unspecified chronic kidney disease (403 10)     (I12 9)   5  Bone metastases (198 5) (C79 51)   6  CKD (chronic kidney disease) stage 2, GFR 60-89 ml/min (585 2) (N18 2)   7  Decreased hearing, bilateral (389 9) (H91 93)   8  Eczema, unspecified type (692 9) (L30 9)   9  Encounter for preventive health examination (V70 0) (Z00 00)   10  Encounter for screening colonoscopy (V76 51) (Z12 11)   11  Flu vaccine need (V04 81) (Z23)   12   Glaucoma (365  9) (H40 9)   13  Hyperlipidemia, acquired (272 4) (E78 5)   14  Macular degeneration (362 50) (H35 30)   15  Metastatic disease (199 1) (C79 9)   16  Need for influenza vaccination (V04 81) (Z23)   17  Need for pneumococcal vaccine (V03 82) (Z23)   18  Need for Tdap vaccination (V06 1) (Z23)   19  Obesity (278 00) (E66 9)   20  Other abnormal glucose (790 29) (R73 09)   21  Prostate cancer (185) (C61)    Past Medical History   1  History of acute bronchitis (V12 69) (Z87 09)   2  History of urinary frequency (V13 09) (Z87 898)   3  History of Prostate Cancer (V10 46)   4  History of Urinary Tract Infection (V13 02)                Reviewed  Surgical History   1  History of Cholecystectomy   2  History of Colonoscopy (Fiberoptic)                Reviewed       Family History   Mother    1  Family history of Breast Cancer (V16 3)  Father    2  Family history of Prostate Cancer (V16 42)  Brother    3  Family history of Prostate Cancer (V16 42)  Family History    4  Family history of cerebrovascular accident (CVA) (V17 1) (Z82 3)   5  Family history of hypertension (V17 49) (Z82 49)                Reviewed       Social History    · Being A Social Drinker   · Former smoker (I25 05) (R16 416)                Reviewed       Current Meds    1  Adult Aspirin EC Low Strength 81 MG Oral Tablet Delayed Release Recorded   2  Combigan 0 2-0 5 % Ophthalmic Solution; Therapy: (Scott Webb) to Recorded   3  Daily Multiple Vitamins TABS Recorded   4  Dorzolamide HCl - 2 % Ophthalmic Solution; Therapy: (Scott Webb) to Recorded   5  Lisinopril 20 MG Oral Tablet; TAKE 1 TABLET DAILY FOR BLOOD PRESSURE; Therapy: 58OSA2667 to (Evaluate:19Oct2018)  Requested for: 26Oct2017; Last     QX:77LEE3902 Ordered   6  Metoprolol Tartrate 25 MG Oral Tablet; TAKE 1 TABLET EVERY 12 HOURS;      Therapy: 52KSK4522 to [id number])  Requested for: 08KEN0587; Last     GI:68KTZ5811; Status: ACTIVE - Transmit to Pharmacy - Awaiting Verification Ordered   7  PreserVision/Lutein CAPS; Therapy: (Mickey Brick) to Recorded   8  Travatan Z 0 004 % Ophthalmic Solution; Therapy: 70Gnf7067 to (Last Rx:20Apr2011)  Requested for: 20Apr2011 Ordered   9  Triamcinolone Acetonide 0 1 % External Cream; APPLY  AND RUB  IN A THIN FILM TO     AFFECTED AREAS TWICE DAILY  (AM AND PM); Therapy: 90OUW7398 to (Evaluate:31Oct2017)  Requested for: 26Oct2017; Last     Rx:24Oct2017; Status: ACTIVE - Transmit to Pharmacy - Awaiting Verification Ordered    Allergies   1  No Known Drug Allergies                Reviewed       Vitals   Vital Signs    Recorded: 20Dec2017 09:19AM   Temperature 97 6 F   Heart Rate 75   Respiration 18   Systolic 235   Diastolic 70   Height 5 ft 11 in   Weight 260 lb 8 oz   BMI Calculated 36 33   BSA Calculated 2 36   O2 Saturation 97   Pain Scale 0                 Reviewed       Physical Exam             Patient is alert and oriented and not in  distress  Stable vital signs  No icterus  No oral thrush  No palpable neck mass  Lung fields are clear to percussion and auscultation  Heart rate is regular  Systolic murmur  Abdomen soft and nontender  No palpable abdominal mass  No ascites  No edema of ankles  No calf tenderness  No focal neurological deficit  No skin rash  No palpable lymphadenopathy  Good arterial pulses  No clubbing  Anxious  Performance status one             ECOG 1       Results/Data   (1) COMPREHENSIVE METABOLIC PANEL 82MMD5772 76:59XY Proothi, Carlos      Test Name Result Flag Reference   GLUCOSE 156 mg/dL H 65-99   Fasting reference interval           For someone without known diabetes, a glucose     value >125 mg/dL indicates that they may have     diabetes and this should be confirmed with a     follow-up test    UREA NITROGEN (BUN) 20 mg/dL  7-25   CREATININE 0 82 mg/dL  0 70-1 11   For patients >52years of age, the reference limit     for Creatinine is approximately 13% higher for people     identified as -American  eGFR NON-AFR  AMERICAN 83 mL/min/1 73m2  > OR = 60   eGFR AFRICAN AMERICAN 96 mL/min/1 73m2  > OR = 60   BUN/CREATININE RATIO   7-52   NOT APPLICABLE (calc)   SODIUM 137 mmol/L  135-146   POTASSIUM 4 1 mmol/L  3 5-5 3   CHLORIDE 106 mmol/L     CARBON DIOXIDE 22 mmol/L  20-31   CALCIUM 8 7 mg/dL  8 6-10 3   PROTEIN, TOTAL 6 5 g/dL  6 1-8 1   ALBUMIN 3 9 g/dL  3 6-5 1   GLOBULIN 2 6 g/dL (calc)  1 9-3 7   ALBUMIN/GLOBULIN RATIO 1 5 (calc)  1 0-2 5   BILIRUBIN, TOTAL 0 5 mg/dL  0 2-1 2   ALKALINE PHOSPHATASE 67 U/L     AST 18 U/L  10-35   ALT 19 U/L  9-46      (1) CBC/PLT/DIFF 22ZXG9464 08:54AM Proothi, Carlos      Test Name Result Flag Reference   WHITE BLOOD CELL COUNT 4 4 Thousand/uL  3 8-10 8   RED BLOOD CELL COUNT 4 09 Million/uL L 4 20-5 80   HEMOGLOBIN 12 8 g/dL L 13 2-17 1   HEMATOCRIT 38 2 % L 38 5-50 0   MCV 93 4 fL  80 0-100 0   MCH 31 3 pg  27 0-33 0   MCHC 33 5 g/dL  32 0-36 0   RDW 12 6 %  11 0-15 0   PLATELET COUNT 887 Thousand/uL  140-400   ABSOLUTE NEUTROPHILS 2477 cells/uL  8701-3460   ABSOLUTE LYMPHOCYTES 1377 cells/uL  850-3900   ABSOLUTE MONOCYTES 282 cells/uL  200-950   ABSOLUTE EOSINOPHILS 233 cells/uL     ABSOLUTE BASOPHILS 31 cells/uL  0-200   NEUTROPHILS 56 3 %     LYMPHOCYTES 31 3 %     MONOCYTES 6 4 %     EOSINOPHILS 5 3 %     BASOPHILS 0 7 %     MPV 10 1 fL  7 5-12 5      (1) PSA (SCREEN) (Dx V76 44 Screen for Prostate Cancer) 82KXW0912 08:54AM Proothi, Carlos   REPORT COMMENT:     FASTING:YES      Test Name Result Flag Reference   PSA, TOTAL 5 0 ng/mL H < OR = 4 0   The total PSA value from this assay system is      standardized against the WHO standard  The test      result will be approximately 20% lower when compared      to the equimolar-standardized total PSA ([employer])  Comparison of serial PSA results should be      interpreted with this fact in mind             This test was performed using the Siemens chemiluminescent method  Values obtained from      different assay methods cannot be used     interchangeably  PSA levels, regardless of     value, should not be interpreted as absolute     evidence of the presence or absence of disease  Health Management   Encounter for screening colonoscopy   COLONOSCOPY; every 5 years; Last 10Oct2006; Next Due: 62LFY6831; Overdue    Future Appointments      Date/Time Provider Specialty Site   04/24/2018 09:15 AM KEENAN Angela   Urology [facility]   03/21/2018 09:20 AM Tadeo Spain MD Hematology [facility]   41/67/4848 39:61 PM Chris Heaton, [address] [place] [address]   01/17/2018 09:00 AM Rendall Face Urology [facility] UROLOGY  [place]     Signatures    Electronically signed by : Seth Rojas MD; Dec 20 2017  5:19PM EST                       (Author)

## 2017-12-27 DIAGNOSIS — C61 MALIGNANT NEOPLASM OF PROSTATE (HCC): ICD-10-CM

## 2017-12-28 ENCOUNTER — LAB CONVERSION - ENCOUNTER (OUTPATIENT)
Dept: OTHER | Facility: OTHER | Age: 82
End: 2017-12-28

## 2017-12-28 LAB
A/G RATIO (HISTORICAL): 1.3 (CALC) (ref 1–2.5)
ALBUMIN SERPL BCP-MCNC: 3.9 G/DL (ref 3.6–5.1)
ALP SERPL-CCNC: 75 U/L (ref 40–115)
ALT SERPL W P-5'-P-CCNC: 22 U/L (ref 9–46)
AST SERPL W P-5'-P-CCNC: 23 U/L (ref 10–35)
BASOPHILS # BLD AUTO: 0.9 %
BASOPHILS # BLD AUTO: 50 CELLS/UL (ref 0–200)
BILIRUB SERPL-MCNC: 0.5 MG/DL (ref 0.2–1.2)
BUN SERPL-MCNC: 22 MG/DL (ref 7–25)
BUN/CREA RATIO (HISTORICAL): ABNORMAL (CALC) (ref 6–22)
CALCIUM SERPL-MCNC: 9.4 MG/DL (ref 8.6–10.3)
CHLORIDE SERPL-SCNC: 106 MMOL/L (ref 98–110)
CO2 SERPL-SCNC: 28 MMOL/L (ref 20–31)
CREAT SERPL-MCNC: 0.98 MG/DL (ref 0.7–1.11)
DEPRECATED RDW RBC AUTO: 13 % (ref 11–15)
EGFR AFRICAN AMERICAN (HISTORICAL): 83 ML/MIN/1.73M2
EGFR-AMERICAN CALC (HISTORICAL): 72 ML/MIN/1.73M2
EOSINOPHIL # BLD AUTO: 252 CELLS/UL (ref 15–500)
EOSINOPHIL # BLD AUTO: 4.5 %
GAMMA GLOBULIN (HISTORICAL): 3.1 G/DL (CALC) (ref 1.9–3.7)
GLUCOSE (HISTORICAL): 109 MG/DL (ref 65–99)
HCT VFR BLD AUTO: 40.1 % (ref 38.5–50)
HGB BLD-MCNC: 13.8 G/DL (ref 13.2–17.1)
LYMPHOCYTES # BLD AUTO: 1943 CELLS/UL (ref 850–3900)
LYMPHOCYTES # BLD AUTO: 34.7 %
MCH RBC QN AUTO: 32.2 PG (ref 27–33)
MCHC RBC AUTO-ENTMCNC: 34.4 G/DL (ref 32–36)
MCV RBC AUTO: 93.5 FL (ref 80–100)
MONOCYTES # BLD AUTO: 543 CELLS/UL (ref 200–950)
MONOCYTES (HISTORICAL): 9.7 %
NEUTROPHILS # BLD AUTO: 2811 CELLS/UL (ref 1500–7800)
NEUTROPHILS # BLD AUTO: 50.2 %
PLATELET # BLD AUTO: 217 THOUSAND/UL (ref 140–400)
PMV BLD AUTO: 10.3 FL (ref 7.5–12.5)
POTASSIUM SERPL-SCNC: 4.8 MMOL/L (ref 3.5–5.3)
PROSTATE SPECIFIC ANTIGEN TOTAL (HISTORICAL): 6 NG/ML
RBC # BLD AUTO: 4.29 MILLION/UL (ref 4.2–5.8)
SODIUM SERPL-SCNC: 141 MMOL/L (ref 135–146)
TOTAL PROTEIN (HISTORICAL): 7 G/DL (ref 6.1–8.1)
WBC # BLD AUTO: 5.6 THOUSAND/UL (ref 3.8–10.8)

## 2018-01-01 ENCOUNTER — TELEPHONE (OUTPATIENT)
Dept: HEMATOLOGY ONCOLOGY | Facility: CLINIC | Age: 83
End: 2018-01-01

## 2018-01-01 ENCOUNTER — HOSPITAL ENCOUNTER (EMERGENCY)
Facility: HOSPITAL | Age: 83
Discharge: HOME | End: 2018-12-28
Attending: EMERGENCY MEDICINE
Payer: MEDICARE

## 2018-01-01 ENCOUNTER — OFFICE VISIT (OUTPATIENT)
Dept: HEMATOLOGY ONCOLOGY | Facility: CLINIC | Age: 83
End: 2018-01-01
Payer: MEDICARE

## 2018-01-01 ENCOUNTER — TELEPHONE (OUTPATIENT)
Dept: HEMATOLOGY ONCOLOGY | Facility: MEDICAL CENTER | Age: 83
End: 2018-01-01

## 2018-01-01 ENCOUNTER — HOSPITAL ENCOUNTER (OUTPATIENT)
Dept: INFUSION CENTER | Facility: CLINIC | Age: 83
End: 2018-01-01

## 2018-01-01 VITALS
SYSTOLIC BLOOD PRESSURE: 159 MMHG | RESPIRATION RATE: 18 BRPM | TEMPERATURE: 98.9 F | DIASTOLIC BLOOD PRESSURE: 78 MMHG | OXYGEN SATURATION: 98 % | HEART RATE: 89 BPM

## 2018-01-01 VITALS
HEIGHT: 70 IN | RESPIRATION RATE: 18 BRPM | OXYGEN SATURATION: 97 % | TEMPERATURE: 99 F | WEIGHT: 234.2 LBS | BODY MASS INDEX: 33.53 KG/M2 | DIASTOLIC BLOOD PRESSURE: 64 MMHG | HEART RATE: 97 BPM | SYSTOLIC BLOOD PRESSURE: 122 MMHG

## 2018-01-01 DIAGNOSIS — C61 PROSTATE CANCER (HCC): Primary | ICD-10-CM

## 2018-01-01 DIAGNOSIS — D69.6 THROMBOCYTOPENIA (HCC): ICD-10-CM

## 2018-01-01 DIAGNOSIS — C79.51 BONE METASTASES (HCC): ICD-10-CM

## 2018-01-01 DIAGNOSIS — D61.89 ANEMIA DUE TO OTHER BONE MARROW FAILURE (HCC): Primary | ICD-10-CM

## 2018-01-01 DIAGNOSIS — C79.9 METASTATIC DISEASE (HCC): ICD-10-CM

## 2018-01-01 DIAGNOSIS — I10 ESSENTIAL HYPERTENSION, BENIGN: Primary | ICD-10-CM

## 2018-01-01 DIAGNOSIS — D64.9 ANEMIA: Primary | ICD-10-CM

## 2018-01-01 LAB
ABO GROUP BLD BPU: NORMAL
ABO GROUP BLD BPU: NORMAL
ABO GROUP BLD: NORMAL
ALBUMIN SERPL-MCNC: 3.4 G/DL (ref 3.6–5.1)
ALBUMIN/GLOB SERPL: 1.4 (CALC) (ref 1–2.5)
ALP SERPL-CCNC: 450 U/L (ref 40–115)
ALT SERPL-CCNC: 32 U/L (ref 9–46)
AST SERPL-CCNC: 41 U/L (ref 10–35)
BASOPHILS # BLD AUTO: 126 CELLS/UL (ref 0–200)
BASOPHILS NFR BLD AUTO: 3 %
BILIRUB SERPL-MCNC: 0.8 MG/DL (ref 0.2–1.2)
BLD GP AB SCN SERPL QL: NEGATIVE
BPU ID: NORMAL
BPU ID: NORMAL
BUN SERPL-MCNC: 15 MG/DL (ref 7–25)
BUN/CREAT SERPL: 22 (CALC) (ref 6–22)
CALCIUM SERPL-MCNC: 8 MG/DL (ref 8.6–10.3)
CHLORIDE SERPL-SCNC: 105 MMOL/L (ref 98–110)
CO2 SERPL-SCNC: 24 MMOL/L (ref 20–32)
CREAT SERPL-MCNC: 0.68 MG/DL (ref 0.7–1.11)
EOSINOPHIL # BLD AUTO: 126 CELLS/UL (ref 15–500)
EOSINOPHIL NFR BLD AUTO: 3 %
ERYTHROCYTE [DISTWIDTH] IN BLOOD BY AUTOMATED COUNT: 19.2 % (ref 11–15)
GLOBULIN SER CALC-MCNC: 2.5 G/DL (CALC) (ref 1.9–3.7)
GLUCOSE SERPL-MCNC: 122 MG/DL (ref 65–139)
HCT VFR BLD AUTO: 20.9 % (ref 38.5–50)
HGB BLD-MCNC: 7 G/DL (ref 13.2–17.1)
LYMPHOCYTES # BLD MANUAL: 1092 CELLS/UL (ref 850–3900)
LYMPHOCYTES NFR BLD AUTO: 26 %
MCH RBC QN AUTO: 31.7 PG (ref 27–33)
MCHC RBC AUTO-ENTMCNC: 33.5 G/DL (ref 32–36)
MCV RBC AUTO: 94.6 FL (ref 80–100)
METAMYELOCYTES # BLD: 168 CELLS/UL
METAMYELOCYTES NFR BLD MANUAL: 4 %
MONOCYTES # BLD AUTO: 714 CELLS/UL (ref 200–950)
MONOCYTES NFR BLD AUTO: 17 %
NEUTROPHILS # BLD AUTO: 1848 CELLS/UL (ref 1500–7800)
NEUTROPHILS NFR BLD AUTO: 44 %
NEUTS BAND # BLD: 126 CELLS/UL (ref 0–750)
NEUTS BAND NFR BLD MANUAL: 3 %
NRBC # BLD: 588 CELLS/UL
NRBC BLD-RTO: 14 /100 WBC
PLATELET # BLD AUTO: 18 THOUSAND/UL (ref 140–400)
PMV BLD REES-ECKER: 12.6 FL (ref 7.5–12.5)
POTASSIUM SERPL-SCNC: 4.7 MMOL/L (ref 3.5–5.3)
PROT SERPL-MCNC: 5.9 G/DL (ref 6.1–8.1)
PSA SERPL-MCNC: 253.9 NG/ML
RBC # BLD AUTO: 2.21 MILLION/UL (ref 4.2–5.8)
RH BLD: POSITIVE
SL AMB EGFR AFRICAN AMERICAN: 103 ML/MIN/1.73M2
SL AMB EGFR NON AFRICAN AMERICAN: 89 ML/MIN/1.73M2
SODIUM SERPL-SCNC: 136 MMOL/L (ref 135–146)
SPECIMEN EXPIRATION DATE: NORMAL
UNIT DISPENSE STATUS: NORMAL
UNIT DISPENSE STATUS: NORMAL
UNIT PRODUCT CODE: NORMAL
UNIT PRODUCT CODE: NORMAL
UNIT RH: NORMAL
UNIT RH: NORMAL
WBC # BLD AUTO: 4.2 THOUSAND/UL (ref 3.8–10.8)

## 2018-01-01 PROCEDURE — 86923 COMPATIBILITY TEST ELECTRIC: CPT

## 2018-01-01 PROCEDURE — 36415 COLL VENOUS BLD VENIPUNCTURE: CPT | Performed by: EMERGENCY MEDICINE

## 2018-01-01 PROCEDURE — 86900 BLOOD TYPING SEROLOGIC ABO: CPT | Performed by: EMERGENCY MEDICINE

## 2018-01-01 PROCEDURE — P9021 RED BLOOD CELLS UNIT: HCPCS

## 2018-01-01 PROCEDURE — 36430 TRANSFUSION BLD/BLD COMPNT: CPT

## 2018-01-01 PROCEDURE — 86850 RBC ANTIBODY SCREEN: CPT | Performed by: EMERGENCY MEDICINE

## 2018-01-01 PROCEDURE — 99283 EMERGENCY DEPT VISIT LOW MDM: CPT

## 2018-01-01 PROCEDURE — 99214 OFFICE O/P EST MOD 30 MIN: CPT | Performed by: INTERNAL MEDICINE

## 2018-01-01 PROCEDURE — 86901 BLOOD TYPING SEROLOGIC RH(D): CPT | Performed by: EMERGENCY MEDICINE

## 2018-01-01 PROCEDURE — P9037 PLATE PHERES LEUKOREDU IRRAD: HCPCS

## 2018-01-01 RX ORDER — BICALUTAMIDE 50 MG/1
50 TABLET, FILM COATED ORAL 3 TIMES DAILY
COMMUNITY
End: 2019-01-01 | Stop reason: ALTCHOICE

## 2018-01-03 ENCOUNTER — HOSPITAL ENCOUNTER (OUTPATIENT)
Dept: INFUSION CENTER | Facility: CLINIC | Age: 83
Discharge: HOME | End: 2018-01-05
Payer: MEDICARE

## 2018-01-09 ENCOUNTER — GENERIC CONVERSION - ENCOUNTER (OUTPATIENT)
Dept: OTHER | Facility: OTHER | Age: 83
End: 2018-01-09

## 2018-01-12 VITALS
DIASTOLIC BLOOD PRESSURE: 80 MMHG | WEIGHT: 256 LBS | HEART RATE: 80 BPM | BODY MASS INDEX: 35.7 KG/M2 | SYSTOLIC BLOOD PRESSURE: 140 MMHG

## 2018-01-12 NOTE — MISCELLANEOUS
Message  Receive an email from Lucia Hernandez on 8/18/17 that pt wants to wait until Oct OV to decide about treatment  Spoke with pt and does want to wait  Informed Dr Catarino Shaver  Called pt and asked if wants a sooner appt to discuss treatment with Dr Catarino Shaver but declined and will keep Oct appt  Active Problems    1  Abnormal TSH (790 6) (R94 6)   2  Acquired hypertriglyceridemia (272 1) (E78 1)   3  Benign essential hypertension (401 1) (I10)   4  Benign hypertensive CKD, stage 1-4 or unspecified chronic kidney disease (403 10)   (I12 9)   5  Bone metastases (198 5) (C79 51)   6  CKD (chronic kidney disease) stage 2, GFR 60-89 ml/min (585 2) (N18 2)   7  Decreased hearing, bilateral (389 9) (H91 93)   8  Eczema, unspecified type (692 9) (L30 9)   9  Encounter for preventive health examination (V70 0) (Z00 00)   10  Encounter for screening colonoscopy (V76 51) (Z12 11)   11  Glaucoma (365 9) (H40 9)   12  Macular degeneration (362 50) (H35 30)   13  Metastatic disease (199 1) (C79 9)   14  Need for influenza vaccination (V04 81) (Z23)   15  Need for pneumococcal vaccine (V03 82) (Z23)   16  Need for Tdap vaccination (V06 1) (Z23)   17  Obesity (278 00) (E66 9)   18  Other abnormal glucose (790 29) (R73 09)   19  Prostate cancer (185) (C61)    Current Meds   1  Adult Aspirin EC Low Strength 81 MG Oral Tablet Delayed Release Recorded   2  Combigan 0 2-0 5 % Ophthalmic Solution; Therapy: (Janna Lamp) to Recorded   3  Daily Multiple Vitamins TABS Recorded   4  Dorzolamide HCl - 2 % Ophthalmic Solution; Therapy: (Janna Lamp) to Recorded   5  Lisinopril 20 MG Oral Tablet; TAKE 1 TABLET DAILY FOR BLOOD PRESSURE; Therapy: 08EUW7448 to (Aden Carlos)  Requested for: 31IPK8681; Last   Rx:10Nov2016 Ordered   6  Metoprolol Tartrate 25 MG Oral Tablet; TAKE 1 TABLET EVERY 12 HOURS; Therapy: 43OQR7220 to (Aden Carlos)  Requested for: 54DDR6918; Last   Rx:10Nov2016 Ordered   7  PreserVision/Lutein CAPS; Therapy: (Andrea Borja) to Recorded   8  Travatan Z 0 004 % Ophthalmic Solution; Therapy: 20Apr2011 to (Last Rx:20Apr2011)  Requested for: 20Apr2011 Ordered    Allergies    1   No Known Drug Allergies    Signatures   Electronically signed by : Veronica Norman RN; Aug 21 2017  9:37AM EST                       (Author)

## 2018-01-13 VITALS
SYSTOLIC BLOOD PRESSURE: 132 MMHG | WEIGHT: 254.5 LBS | BODY MASS INDEX: 35.63 KG/M2 | HEART RATE: 72 BPM | HEIGHT: 71 IN | DIASTOLIC BLOOD PRESSURE: 78 MMHG

## 2018-01-13 VITALS
HEART RATE: 72 BPM | SYSTOLIC BLOOD PRESSURE: 132 MMHG | DIASTOLIC BLOOD PRESSURE: 76 MMHG | RESPIRATION RATE: 17 BRPM | WEIGHT: 259.38 LBS | TEMPERATURE: 97.4 F | BODY MASS INDEX: 36.31 KG/M2 | HEIGHT: 71 IN | OXYGEN SATURATION: 97 %

## 2018-01-13 VITALS
HEIGHT: 71 IN | SYSTOLIC BLOOD PRESSURE: 200 MMHG | HEART RATE: 60 BPM | BODY MASS INDEX: 35.19 KG/M2 | WEIGHT: 251.38 LBS | DIASTOLIC BLOOD PRESSURE: 88 MMHG

## 2018-01-14 VITALS
WEIGHT: 258.25 LBS | RESPIRATION RATE: 16 BRPM | OXYGEN SATURATION: 97 % | HEIGHT: 71 IN | SYSTOLIC BLOOD PRESSURE: 138 MMHG | DIASTOLIC BLOOD PRESSURE: 70 MMHG | TEMPERATURE: 98.4 F | BODY MASS INDEX: 36.15 KG/M2 | HEART RATE: 71 BPM

## 2018-01-14 VITALS
WEIGHT: 251.5 LBS | HEIGHT: 71 IN | SYSTOLIC BLOOD PRESSURE: 134 MMHG | BODY MASS INDEX: 35.21 KG/M2 | DIASTOLIC BLOOD PRESSURE: 82 MMHG

## 2018-01-22 VITALS
WEIGHT: 262.13 LBS | HEIGHT: 71 IN | DIASTOLIC BLOOD PRESSURE: 64 MMHG | BODY MASS INDEX: 36.7 KG/M2 | SYSTOLIC BLOOD PRESSURE: 108 MMHG

## 2018-01-22 VITALS
DIASTOLIC BLOOD PRESSURE: 70 MMHG | SYSTOLIC BLOOD PRESSURE: 130 MMHG | WEIGHT: 263 LBS | HEART RATE: 60 BPM | BODY MASS INDEX: 36.68 KG/M2

## 2018-01-23 VITALS
DIASTOLIC BLOOD PRESSURE: 70 MMHG | WEIGHT: 260.5 LBS | OXYGEN SATURATION: 97 % | BODY MASS INDEX: 36.47 KG/M2 | SYSTOLIC BLOOD PRESSURE: 142 MMHG | HEIGHT: 71 IN | RESPIRATION RATE: 18 BRPM | TEMPERATURE: 97.6 F | HEART RATE: 75 BPM

## 2018-01-23 NOTE — MISCELLANEOUS
Message   Recorded as Task   Date: 01/09/2018 01:40 PM, Created By: Claire Osorio   Task Name: Call Back   Assigned To: Melissa Toro   Regarding Patient: Jakub Decker, Status: Active   Comment:    Aida Morel - 09 Jan 2018 1:40 PM     TASK CREATED  pt called and was told to call our office if he was to get any dental work done  He is getting a tooth extraction within the next week or two and needs feedback    [id number] - 09 Jan 2018 3:42 PM     TASK EDITED  Patient had his last xgeva injection on 12/6/17  Called and spoke with the patient and made him aware that he can proceed with scheduling his tooth extraction for the week of 1/15/18  Patient will call the office and give the exact date of the tooth extraction, so the next xgeva dose can be given 1 month after the dental work to avoid osteonecrosis of the jaw  Called [facility] and made them aware that patient would be going for a tooth extraction the week of 1/15/18  Active Problems    1  Abnormal TSH (790 6) (R94 6)   2  Acquired hypertriglyceridemia (272 1) (E78 1)   3  Benign essential hypertension (401 1) (I10)   4  Benign hypertensive CKD, stage 1-4 or unspecified chronic kidney disease (403 10)   (I12 9)   5  Bone metastases (198 5) (C79 51)   6  CKD (chronic kidney disease) stage 2, GFR 60-89 ml/min (585 2) (N18 2)   7  Decreased hearing, bilateral (389 9) (H91 93)   8  Eczema, unspecified type (692 9) (L30 9)   9  Encounter for preventive health examination (V70 0) (Z00 00)   10  Encounter for screening colonoscopy (V76 51) (Z12 11)   11  Flu vaccine need (V04 81) (Z23)   12  Glaucoma (365 9) (H40 9)   13  Hyperlipidemia, acquired (272 4) (E78 5)   14  Macular degeneration (362 50) (H35 30)   15  Metastatic disease (199 1) (C79 9)   16  Need for influenza vaccination (V04 81) (Z23)   17  Need for pneumococcal vaccine (V03 82) (Z23)   18  Need for Tdap vaccination (V06 1) (Z23)   19  Obesity (278 00) (E66 9)   20  Other abnormal glucose (790 29) (R73 09)   21  Prostate cancer (185) (C61)    Current Meds   1  Adult Aspirin EC Low Strength 81 MG Oral Tablet Delayed Release Recorded   2  Combigan 0 2-0 5 % Ophthalmic Solution; Therapy: (Blank Mano) to Recorded   3  Daily Multiple Vitamins TABS Recorded   4  Dorzolamide HCl - 2 % Ophthalmic Solution; Therapy: (Blank Mano) to Recorded   5  Lisinopril 20 MG Oral Tablet; TAKE 1 TABLET DAILY FOR BLOOD PRESSURE; Therapy: 38ZUG9365 to (Evaluate:19Oct2018)  Requested for: 26Oct2017; Last   XM:95ZXR9234 Ordered   6  Metoprolol Tartrate 25 MG Oral Tablet; TAKE 1 TABLET EVERY 12 HOURS; Therapy: 86OCX3283 to [id number])  Requested for: 0499 52 06 34; Last   Rx:24Oct2017; Status: ACTIVE - Transmit to Pharmacy - Awaiting Verification Ordered   7  PreserVision/Lutein CAPS; Therapy: (Tauna Fling) to Recorded   8  Travatan Z 0 004 % Ophthalmic Solution; Therapy: 13Oum7009 to (Last Rx:54Gug9550)  Requested for: 20Apr2011 Ordered   9  Triamcinolone Acetonide 0 1 % External Cream; APPLY  AND RUB  IN A THIN FILM TO   AFFECTED AREAS TWICE DAILY  (AM AND PM); Therapy: 22VMF4986 to (Evaluate:31Oct2017)  Requested for: 26Oct2017; Last   Rx:24Oct2017; Status: ACTIVE - Transmit to Pharmacy - Awaiting Verification Ordered    Allergies    1  No Known Drug Allergies    Signatures   Electronically signed by :  Miguel Angel Rob RN; Jan 9 2018  3:42PM EST                       (Author)

## 2018-01-24 NOTE — PROGRESS NOTES
Assessment   1  Encounter for preventive health examination (V70 0) (Z00 00)    Plan  Abnormal TSH, Acquired hypertriglyceridemia, Benign essential hypertension    · (1) BASIC METABOLIC PROFILE; Status:Active - Retrospective By Protocol  Authorization; Requested for:2018; Abnormal TSH, Acquired hypertriglyceridemia, Benign essential hypertension, Benign  hypertensive CKD, stage 1-4 or unspecified chronic kidney disease,  Obesity    · (1) TSH; Status:Active - Retrospective By Protocol Authorization; Requested  for:2018; Abnormal TSH, Benign essential hypertension, Benign hypertensive CKD, stage 1-4 or  unspecified chronic kidney disease, Obesity    · (1) T4, FREE; Status:Active - Retrospective By Protocol Authorization; Requested  for:2018; Acquired hypertriglyceridemia, Benign essential hypertension, Benign hypertensive  CKD, stage 1-4 or unspecified chronic kidney disease,  Obesity    · (1) LIPID PANEL, FASTING; Status:Active - Retrospective By Protocol Authorization; Requested for:2018; Flu vaccine need    · Administered: Fluzone High-Dose 0 5 ML Intramuscular Suspension Prefilled Syringe    Discussion/Summary    AWV completed  1  Impression:1  Subsequent Annual Wellness Visit1   Cardiovascular screening and counselin  screening is current1   Diabetes screening and counselin  screening is current1   Colorectal cancer screening and counselin  screening not indicated1   Prostate cancer screening and counselin  has prostate CA w mets1   Osteoporosis screening and counselin  the risks and benefits of screening were discussed1  and screening is current1   Abdominal aortic aneurysm screening and counselin  screening not indicated1   Glaucoma screening and counselin  screening is current1   HIV screening and counselin  screening not indicated1   Advance Directive Plannin  complete and up to date1    Patient Discussion:1  follow-up visit needed in 6m1         1 Amended By: Morgan Flanagan; Oct 20 2017 2:04 PM EST    Chief Complaint  AWV - blue folder given  pete parker      History of Present Illness  HPI: AWV  see 2nd prob note  1    Welcome to [employer] and Wellness Visits: The patient is being seen for the initial annual wellness visit  Medicare Screening and Risk Factors   Hospitalizations: no previous hospitalizations  Medicare Screening Tests Risk Questions   Drug and Alcohol Use: The patient is a former cigarette smoker  The patient reports drinking 1 drinks per day  He has never used illicit drugs  Diet and Physical Activity: Current diet includes well balanced meals, limited junk food, 1 servings of fruit per day, 1 servings of vegetables per day, 1 servings of meat per day, 1 servings of simple carbohydrates per day, 1 servings of dairy products per day, 2 cups of coffee per day, 1 cans of diet soda per day and 2 glasses water daily  He exercises infrequently  Exercise: walking 15 minutes per day  Mood Disorder and Cognitive Impairment Screening:   Depression screening  negative for symptoms  He denies feeling down, depressed, or hopeless over the past two weeks  He denies feeling little interest or pleasure in doing things over the past two weeks  Cognitive impairment screening: denies difficulty learning/retaining new information, denies difficulty handling complex tasks, denies difficulty with reasoning, denies difficulty with spatial ability and orientation, denies difficulty with language and denies difficulty with behavior  Functional Ability/Level of Safety: Hearing is slightly decreased, significantly decreased in the right ear and slightly decreased in the left ear   Activities of daily living details: does not need help using the phone, no transportation help needed, does not need help shopping, no meal preparation help needed, does not need help doing housework, does not need help doing laundry, does not need help managing medications and does not need help managing money  Fall risk factors: The patient fell 0 times in the past 12 months  Home safety risk factors:  no unfamiliar surroundings, no loose rugs, no poor household lighting, no uneven floors, no household clutter, grab bars in the bathroom and handrails on the stairs  Advance Directives: Advance directives: living will, durable power of  for health care directives and advance directives  Co-Managers and Medical Equipment/Suppliers: See Patient Care Team   Reviewed Updated [address] [place] [address] [address]:   Last Medicare Wellness Visit Information was reviewed, patient interviewed, no change since last AWV1   Preventive Quality Program 65 and Older: Falls Risk: The patient fell 0 times in the past 12 months  The patient is currently asymptomatic Symptoms Include: The patient currently has no urinary incontinence symptoms  Date of last glaucoma screen was 04/2017       1 Amended By: Dawson Hanson; Oct 20 2017 2:02 PM EST    Patient Care Team    Care Team Member Role Specialty Office Number   [facility] [phone]   Jessica HAQUE  Urology [phone]   Fish Mercado MD  Ophthalmology [phone]   Rocky Dawson MD  Radiation Oncology [phone]   Swapna Covarrubias MD Specialist Hematology Oncology [phone]     Review of Systems    Constitutional:1  negative1   Eyes:1  negative1   ENT:1  negative1   Cardiovascular:1  negative1   Respiratory:1  negative1   Gastrointestinal:1  negative1   Genitourinary:1  no dysuria1   Musculoskeletal:1  no diffuse joint pain1   Integumentary and Breasts:1  no rashes1   Psychiatric:1  negative1   Endocrine:1  negative1   Hematologic and Lymphatic:1  negative1         1 Amended By: Dawson Hanson; Oct 20 2017 2:03 PM EST    Active Problems   1  Abnormal TSH (790 6) (R94 6)  2  Acquired hypertriglyceridemia (272 1) (E78 1)  3  Benign essential hypertension (401 1) (I10)  4   Benign hypertensive CKD, stage 1-4 or unspecified chronic kidney disease (403 10)   (I12 9)  5  Bone metastases (198 5) (C79 51)  6  CKD (chronic kidney disease) stage 2, GFR 60-89 ml/min (585 2) (N18 2)  7  Decreased hearing, bilateral (389 9) (H91 93)  8  Eczema, unspecified type (692 9) (L30 9)  9  Encounter for preventive health examination (V70 0) (Z00 00)  10  Encounter for screening colonoscopy (V76 51) (Z12 11)  11  Glaucoma (365 9) (H40 9)  12  Macular degeneration (362 50) (H35 30)  13  Metastatic disease (199 1) (C79 9)  14  Need for influenza vaccination (V04 81) (Z23)  15  Need for pneumococcal vaccine (V03 82) (Z23)  16  Need for Tdap vaccination (V06 1) (Z23)  17  Obesity (278 00) (E66 9)  18  Other abnormal glucose (790 29) (R73 09)  19  Prostate cancer (185) (C61)    Past Medical History    · History of acute bronchitis (V12 69) (Z87 09)   · History of urinary frequency (V13 09) (Z87 898)   · History of Prostate Cancer (V10 46)   · History of Urinary Tract Infection (V13 02)    Surgical History    · History of Cholecystectomy   · History of Colonoscopy (Fiberoptic)    Family History  Mother    · Family history of Breast Cancer (V16 3)  Father    · Family history of Prostate Cancer (V16 42)  Brother    · Family history of Prostate Cancer (V16 42)  Family History    · Family history of cerebrovascular accident (CVA) (V17 1) (Z82 3)   · Family history of hypertension (V17 49) (Z82 49)    Social History    · Being A Social Drinker   · Former smoker (F27 90) (M78 002)   · quit in his 20's    Current Meds  1  Adult Aspirin EC Low Strength 81 MG Oral Tablet Delayed Release Recorded  2  Combigan 0 2-0 5 % Ophthalmic Solution; Therapy: (Minerva Moore) to Recorded  3  Daily Multiple Vitamins TABS Recorded  4  Dorzolamide HCl - 2 % Ophthalmic Solution; Therapy: (Minerva Moore) to Recorded  5  Lisinopril 20 MG Oral Tablet; TAKE 1 TABLET DAILY FOR BLOOD PRESSURE;    Therapy: 05JYH0910 to (Mari Michelo)  Requested for: 98KPF1878; Last   Rx:10Nov2016 Ordered  6  Metoprolol Tartrate 25 MG Oral Tablet; TAKE 1 TABLET EVERY 12 HOURS; Therapy: 21SDF5168 to (Etha Manda)  Requested for: 90KME3980; Last   Rx:10Nov2016 Ordered  7  PreserVision/Lutein CAPS; Therapy: (Raffaele Champagne) to Recorded  8  Travatan Z 0 004 % Ophthalmic Solution; Therapy: 45Hcs2792 to (Last Rx:20Apr2011)  Requested for: 57Put4518 Ordered    Allergies   1  No Known Drug Allergies    Immunizations   1 2 3    Influenza  11-Oct-2012 17-Oct-2013 05-Nov-2015    PCV  21-Jun-2016      PPSV  16-Jan-2012      Tdap  Permanently Deferred: Medical Deferral, Insurance does not cover  , [id number]      Zoster  06-Dec-2012       Vitals  Signs    Systolic: 954  Diastolic: 64  Height: 5 ft 11 in  Weight: 262 lb 2 oz  BMI Calculated: 36 56  BSA Calculated: 2 37    Results/Data  Prime MD Depression Screening 20Oct2017 11:25AM User, Ahs     Test Name Result Flag Reference   PRIME-MD Depression Screening 0/9 - Likely not MD     Depressed Mood: No  Loss of Interest: No     PHQ-2 Adult Depression Screening 20Oct2017 11:24AM User, Ahs     Test Name Result Flag Reference   PHQ-2 Adult Depression Score 0     Over the last two weeks, how often have you been bothered by any of the following problems? Little interest or pleasure in doing things: Not at all - 0  Feeling down, depressed, or hopeless: Not at all - 0   PHQ-2 Adult Depression Screening Negative         Health Management  Encounter for screening colonoscopy   COLONOSCOPY; every 5 years; Last 10Oct2006; Next Due: 15CPS4672; Overdue    Future Appointments    Date/Time Provider Specialty Site   04/24/2018 09:15 AM KEENAN Kerr   Urology [facility] [place]   12/20/2017 09:15 AM Radha Spain MD Hematology Oncology [facility]   49/33/6804 28:06 PM Lencho Antunez, [address] [place] [address]   01/17/2018 09:00 AM Dg Navas Urology [facility] [facility] [address] RODRÍGUEZ     Signatures   Electronically signed by : Miya Varner DO; Oct 20 2844  2:04PM EST                       (Author)

## 2018-01-25 ENCOUNTER — OFFICE VISIT (OUTPATIENT)
Dept: UROLOGY | Facility: CLINIC | Age: 83
End: 2018-01-25
Payer: MEDICARE

## 2018-01-25 VITALS
HEIGHT: 71 IN | WEIGHT: 266 LBS | BODY MASS INDEX: 37.24 KG/M2 | SYSTOLIC BLOOD PRESSURE: 122 MMHG | DIASTOLIC BLOOD PRESSURE: 80 MMHG | HEART RATE: 54 BPM

## 2018-01-25 DIAGNOSIS — C61 PROSTATE CANCER (HCC): Primary | ICD-10-CM

## 2018-01-25 PROCEDURE — 99213 OFFICE O/P EST LOW 20 MIN: CPT | Performed by: PHYSICIAN ASSISTANT

## 2018-01-25 NOTE — PROGRESS NOTES
1/25/2018      Chief Complaint   Patient presents with    Prostate Cancer     S/P IMRT (2011) ADT (4/3/17)       History of Present Illness  Brandi Perez is a 81 y o  male here for follow up evaluation of  his [place] 8 prostate cancer status post IMRT (2011) and ADT (initiated 4/3/2017)  The patient presents today with no lower urinary tract complaints  His current PSA 12/27/17 is 6 0  Prior 11/30/17 it was 5 0, 10/5/17 5 8, 8/17/17 was 6 9, 6/29/17 5 6, and 3/3/17 9 0  He has been getting Lupron every 6 months starting 4/3/17  States that he is having hot flashes with these are tolerable  Unfortunately, he had had a bone scan 4/20/17 revealing bony metastasis  He has been evaluated by Hematology Oncology in addition to his Lupron has been started on Xgeva 9/2017  The patient recently broke his tooth no be undergoing extraction of the remainder of his tooth in the near future  Because of this he has been off his Xgeva for approximately 1 month  He will have his oral surgery and begin Xgeva approximately 1 month later as per the patient's report  Review of Systems   Constitutional: Negative for activity change  HENT: Positive for dental problem  Gastrointestinal: Negative for abdominal pain     Genitourinary:        Dysuria - no  Incontinence - no  Hesitancy - no  Hematuria - no  Urgency - occasional  Nocturia - no  Empty sensation - yes  Stream quality - fair             Past Medical History  Past Medical History:   Diagnosis Date    Acute bronchitis     Hypertension     Prostate CA ([address] )     Urinary frequency     UTI (urinary tract infection)        Past Social History  Past Surgical History:   Procedure Laterality Date    CHOLECYSTECTOMY      COLONOSCOPY       History   Smoking Status    Former Smoker    Quit date: 1977   Smokeless Tobacco    Never Used       Past Family History  Family History   Problem Relation Age of Onset    Breast cancer Mother     Prostate cancer Father    Josh Hall Prostate cancer Brother        Past Social history  Social History     Social History    Marital status: /Civil Union     Spouse name: N/A    Number of children: N/A    Years of education: N/A     Occupational History    Not on file  Social History Main Topics    Smoking status: Former Smoker     Quit date: 1977    Smokeless tobacco: Never Used    Alcohol use Yes      Comment: 1/day    Drug use: No    Sexual activity: Not on file     Other Topics Concern    Not on file     Social History Narrative    No narrative on file       Current Medications  Current Outpatient Prescriptions   Medication Sig Dispense Refill    aspirin (ECOTRIN LOW STRENGTH) 81 mg EC tablet Take 81 mg by mouth daily      brimonidine-timolol (COMBIGAN) 0 2-0 5 % Administer 1 drop to both eyes every 12 (twelve) hours      brinzolamide (AZOPT) 1 % ophthalmic suspension 1 drop 3 (three) times a day      cholecalciferol (VITAMIN D3) 1,000 units tablet Take 1,000 Units by mouth daily      dorzolamide (TRUSOPT) 2 % ophthalmic solution 1 drop 3 (three) times a day      lisinopril (ZESTRIL) 20 mg tablet Take 20 mg by mouth daily      metoprolol tartrate (LOPRESSOR) 25 mg tablet Take 25 mg by mouth every 12 (twelve) hours      Multiple Vitamins-Minerals (PRESERVISION/LUTEIN) CAPS Take by mouth daily      multivitamin (THERAGRAN) TABS Take 1 tablet by mouth daily      travoprost (TRAVATAN Z) 0 004 % ophthalmic solution 1 drop daily at bedtime      triamcinolone (KENALOG) 0 1 % lotion Apply topically as needed       No current facility-administered medications for this visit  Allergies  No Known Allergies      Past Medical History, Social History, Family History, medications and allergies were reviewed        Vitals  Vitals:    01/25/18 1259   BP: 122/80   Pulse: (!) 54   Weight: 121 kg (266 lb)   Height: 5' 11" (1 803 m)         Physical Exam    Constitutional   General appearance: Patient is seated and in no acute distress, well appearing and well nourished  Head and Face   Head and face: Normal     Eyes   Conjunctiva and lids: No erythema, swelling or discharge  Ears, Nose, Mouth, and Throat   Hearing: Normal     Pulmonary   Respiratory effort: No increased work of breathing or signs of respiratory distress  Cardiovascular   Examination of extremities for edema and/or varicosities: Normal     Abdomen   Abdomen: Non-tender, no masses  Musculoskeletal   Gait and station:     Skin   Skin and subcutaneous tissue: Warm, dry, and intact  No visible lesions or rashes  Psychiatric   Judgment and insight: Normal  Recent and remote memory:  Normal  Mood and affect: Normal        Results  No results found for: PSA  No results found for: GLUCOSE, CALCIUM, NA, K, CO2, CL, BUN, CREATININE  No results found for: WBC, HGB, HCT, MCV, PLT        Orders  Orders Placed This Encounter   Procedures    PSA     This is a patient instruction: This test is non-fasting  Please drink two glasses of water morning of bloodwork  Standing Status:   Future     Standing Expiration Date:   7/25/2019           Assessment and Plan    81 y o  male managed by Dr Pino Livers    1  Gl 8 prostate cancer s/p IMRT (2011) and ADT (4/3/2017) with bony metastasis    Patient's PSA trend was reviewed as above  He will continue getting Lupron injections every 6 months  He received his Lupron injection Q65/75/56 with no complications  PSA has brenda from 5 0 to 6 0 but he has been off Xgeva for a little over a month  He will follow up in 3 months with a PSA to see how he is responding and should have a Lupron at this visit  In the meantime he will continue with Hematology Oncology evaluation  The patient understands and agrees to this treatment plan  All questions and concerns have been addressed and answered

## 2018-01-29 DIAGNOSIS — C61 MALIGNANT NEOPLASM OF PROSTATE (HCC): ICD-10-CM

## 2018-02-12 ENCOUNTER — TELEPHONE (OUTPATIENT)
Dept: HEMATOLOGY ONCOLOGY | Facility: CLINIC | Age: 83
End: 2018-02-12

## 2018-02-12 NOTE — TELEPHONE ENCOUNTER
Wanted to let us know that he had a tooth extracted on 2 5 18  Everything went well  He just wanted to let us know  IF we need to talk to him, we can call him back

## 2018-03-20 ENCOUNTER — TELEPHONE (OUTPATIENT)
Dept: HEMATOLOGY ONCOLOGY | Facility: CLINIC | Age: 83
End: 2018-03-20

## 2018-03-26 ENCOUNTER — TELEPHONE (OUTPATIENT)
Dept: HEMATOLOGY ONCOLOGY | Facility: CLINIC | Age: 83
End: 2018-03-26

## 2018-03-26 DIAGNOSIS — C61 PROSTATE CANCER (HCC): Primary | ICD-10-CM

## 2018-03-26 DIAGNOSIS — C61 MALIGNANT NEOPLASM OF PROSTATE (HCC): ICD-10-CM

## 2018-03-26 NOTE — TELEPHONE ENCOUNTER
Patient's Shyam Lone for 3/28/18 was cancelled due to the patient having dental work done on 3/5/18  The patient was rescheduled for his Xgeva injection on 4/26/18 @ 9:30  Patient verbally understood

## 2018-03-26 NOTE — TELEPHONE ENCOUNTER
Berry Mehta received a call reminding him of an appointment for Donita Byrne at [facility]  He is questioning if he is really to get this  He was on hold due to dental work that was done on 3/5/2018  He missed his f/u with Dr Douglas Murphy due to being out of town on 3/21/18  I scheduled him for the next available appoitment at the [facility] office for 5/16/2018  He does not want to go to any other location  Please call to let him know if he is to get the Donita Byrne on March 28th

## 2018-04-02 DIAGNOSIS — I12.9 HYPERTENSIVE CHRONIC KIDNEY DISEASE WITH STAGE 1 THROUGH STAGE 4 CHRONIC KIDNEY DISEASE, OR UNSPECIFIED CHRONIC KIDNEY DISEASE: ICD-10-CM

## 2018-04-02 DIAGNOSIS — C61 MALIGNANT NEOPLASM OF PROSTATE (HCC): ICD-10-CM

## 2018-04-02 DIAGNOSIS — E66.9 OBESITY: ICD-10-CM

## 2018-04-02 DIAGNOSIS — R94.6 ABNORMAL RESULTS OF THYROID FUNCTION STUDIES: ICD-10-CM

## 2018-04-02 DIAGNOSIS — E78.1 PURE HYPERGLYCERIDEMIA: ICD-10-CM

## 2018-04-02 DIAGNOSIS — I10 ESSENTIAL (PRIMARY) HYPERTENSION: ICD-10-CM

## 2018-04-07 ENCOUNTER — OFFICE VISIT (OUTPATIENT)
Dept: URGENT CARE | Facility: MEDICAL CENTER | Age: 83
End: 2018-04-07
Payer: MEDICARE

## 2018-04-07 ENCOUNTER — APPOINTMENT (OUTPATIENT)
Dept: RADIOLOGY | Facility: MEDICAL CENTER | Age: 83
End: 2018-04-07
Attending: PHYSICIAN ASSISTANT
Payer: MEDICARE

## 2018-04-07 VITALS
OXYGEN SATURATION: 98 % | RESPIRATION RATE: 20 BRPM | DIASTOLIC BLOOD PRESSURE: 84 MMHG | TEMPERATURE: 98.8 F | SYSTOLIC BLOOD PRESSURE: 136 MMHG | HEART RATE: 80 BPM

## 2018-04-07 DIAGNOSIS — M94.0 ACUTE COSTOCHONDRITIS: ICD-10-CM

## 2018-04-07 DIAGNOSIS — R07.81 RIB PAIN ON RIGHT SIDE: Primary | ICD-10-CM

## 2018-04-07 DIAGNOSIS — R07.81 RIB PAIN ON RIGHT SIDE: ICD-10-CM

## 2018-04-07 PROCEDURE — 71101 X-RAY EXAM UNILAT RIBS/CHEST: CPT

## 2018-04-07 PROCEDURE — G0463 HOSPITAL OUTPT CLINIC VISIT: HCPCS | Performed by: PHYSICIAN ASSISTANT

## 2018-04-07 PROCEDURE — 99212 OFFICE O/P EST SF 10 MIN: CPT | Performed by: PHYSICIAN ASSISTANT

## 2018-04-07 RX ORDER — FLUPHENAZINE DECANOATE 25 MG/ML
INJECTION, SOLUTION INTRAMUSCULAR; SUBCUTANEOUS
COMMUNITY
End: 2018-05-18

## 2018-04-07 NOTE — PATIENT INSTRUCTIONS
Costochondritis   WHAT YOU NEED TO KNOW:   Costochondritis is a condition that causes pain in the cartilage that connect your ribs to your sternum (breastbone)  Cartilage is the tough, bendable tissue that protects your bones  DISCHARGE INSTRUCTIONS:   Medicines:   · Acetaminophen: This medicine decreases pain  Acetaminophen is available without a doctor's order  Ask how much to take and how often to take it  Follow directions  Acetaminophen can cause liver damage if not taken correctly  · NSAIDs , such as ibuprofen, help decrease swelling, pain, and fever  This medicine is available with or without a doctor's order  NSAIDs can cause stomach bleeding or kidney problems in certain people  If you take blood thinner medicine, always ask if NSAIDs are safe for you  Always read the medicine label and follow directions  Do not give these medicines to children under 10months of age without direction from your child's healthcare provider  · Take your medicine as directed  Contact your healthcare provider if you think your medicine is not helping or if you have side effects  Tell him of her if you are allergic to any medicine  Keep a list of the medicines, vitamins, and herbs you take  Include the amounts, and when and why you take them  Bring the list or the pill bottles to follow-up visits  Carry your medicine list with you in case of an emergency  Follow up with your healthcare provider as directed:  Write down your questions so you remember to ask them during your visits  Rest:  You may need to get more rest  Learn which movements and activities cause pain, and avoid doing them  Do not carry objects, such as a purse or backpack, if this is painful  Avoid activities such as rowing and weightlifting until your pain decreases or goes away  Ask which activities are best for you to do while you recover  Heat:  Heat helps decrease pain in some patients   Apply heat on the area for 20 to 30 minutes every 2 hours for as many days as directed  Ice:  Ice helps decrease swelling and pain  Ice may also help prevent tissue damage  Use an ice pack, or put crushed ice in a plastic bag  Cover it with a towel and place it on the painful area for 15 to 20 minutes every hour or as directed  Stretching exercises:  Gentle stretching may help your symptoms   a doorway and put your hands on the door frame at the level of your ears or shoulders  Take 1 step forward and gently stretch your chest  Try this with your hands higher up on the doorway  Contact your healthcare provider if:   · You have a fever  · The painful areas of your chest look swollen, red, and feel warm to the touch  · You cannot sleep because of the pain  · You have questions or concerns about your condition or care  © 2017 [employer]  Information is for End User's use only and may not be sold, redistributed or otherwise used for commercial purposes  All illustrations and images included in CareNotes® are the copyrighted property of A [employer] M [employer] Inc  or Tino Gomez  The above information is an  only  It is not intended as medical advice for individual conditions or treatments  Talk to your doctor, nurse or pharmacist before following any medical regimen to see if it is safe and effective for you

## 2018-04-07 NOTE — PROGRESS NOTES
[facility]        NAME: Kavita Yoo is a 81 y o  male  : 1935    MRN: [id number]  DATE: 2018  TIME: 10:51 AM    Assessment and Plan   Rib pain on right side [R07 81]  1  Rib pain on right side  XR ribs right w pa chest min 3 views   2  Acute costochondritis           Patient Instructions       Follow up with PCP in 3-5 days  Proceed to  ER if symptoms worsen  Chief Complaint     Chief Complaint   Patient presents with    Chest Pain     Pt with complaints of right lower rib pain for 4-5 days  Denies injury  Had cough last week  Wife concerned for fracture with pts history of prostates ca and on prolia  History of Present Illness       Chest Pain    This is a new problem  The current episode started in the past 7 days  The onset quality is gradual  The problem occurs intermittently  The problem has been unchanged  The pain is at a severity of 3/10  The pain does not radiate  Pertinent negatives include no abdominal pain, back pain, claudication, cough, diaphoresis, dizziness, exertional chest pressure, fever, headaches, hemoptysis, irregular heartbeat, leg pain, lower extremity edema, malaise/fatigue, nausea, near-syncope, numbness, orthopnea, palpitations, PND, shortness of breath, sputum production, syncope, vomiting or weakness  Patient complains of right anterior lateral chest discomfort increases with inspiration and slightly decreases with expiration  No known trauma though he has been coughing  No other complaints  Nothing alleviates the discomfort  Review of Systems   Review of Systems   Constitutional: Negative for diaphoresis, fever and malaise/fatigue  Respiratory: Negative for cough, hemoptysis, sputum production and shortness of breath  Cardiovascular: Positive for chest pain  Negative for palpitations, orthopnea, claudication, syncope, PND and near-syncope  Gastrointestinal: Negative for abdominal pain, nausea and vomiting     Musculoskeletal: Negative for back pain  Neurological: Negative for dizziness, weakness, numbness and headaches  All other systems reviewed and are negative          Current Medications       Current Outpatient Prescriptions:     aspirin (ECOTRIN LOW STRENGTH) 81 mg EC tablet, Take 81 mg by mouth daily, Disp: , Rfl:     brimonidine-timolol (COMBIGAN) 0 2-0 5 %, Administer 1 drop to both eyes every 12 (twelve) hours, Disp: , Rfl:     brinzolamide (AZOPT) 1 % ophthalmic suspension, 1 drop 3 (three) times a day, Disp: , Rfl:     cholecalciferol (VITAMIN D3) 1,000 units tablet, Take 1,000 Units by mouth daily, Disp: , Rfl:     dorzolamide (TRUSOPT) 2 % ophthalmic solution, 1 drop 3 (three) times a day, Disp: , Rfl:     fluPHENAZine decanoate (PROLIXIN) 25 mg/mL injection, Inject into the shoulder, thigh, or buttocks every 14 (fourteen) days, Disp: , Rfl:     lisinopril (ZESTRIL) 20 mg tablet, Take 20 mg by mouth daily, Disp: , Rfl:     metoprolol tartrate (LOPRESSOR) 25 mg tablet, Take 25 mg by mouth every 12 (twelve) hours, Disp: , Rfl:     Multiple Vitamins-Minerals (PRESERVISION/LUTEIN) CAPS, Take by mouth daily, Disp: , Rfl:     multivitamin (THERAGRAN) TABS, Take 1 tablet by mouth daily, Disp: , Rfl:     travoprost (TRAVATAN Z) 0 004 % ophthalmic solution, 1 drop daily at bedtime, Disp: , Rfl:     triamcinolone (KENALOG) 0 1 % lotion, Apply topically as needed, Disp: , Rfl:     Current Allergies     Allergies as of 04/07/2018    (No Known Allergies)            The following portions of the patient's history were reviewed and updated as appropriate: allergies, current medications, past family history, past medical history, past social history, past surgical history and problem list      Past Medical History:   Diagnosis Date    Acute bronchitis     Hypertension     Prostate CA ([address] )     Urinary frequency     UTI (urinary tract infection)        Past Surgical History:   Procedure Laterality Date    CHOLECYSTECTOMY      COLONOSCOPY      [id number] ([place])       Family History   Problem Relation Age of Onset   Sanjuana Yeung Breast cancer Mother     Prostate cancer Father     Prostate cancer Brother     Stroke Family      CVA    Hypertension Family          Medications have been verified  Objective   /84 (BP Location: Left arm, Patient Position: Sitting, Cuff Size: Large)   Pulse 80   Temp 98 8 °F (37 1 °C) (Tympanic)   Resp 20   SpO2 98%        Physical Exam     Physical Exam   Constitutional: He appears well-developed and well-nourished  Cardiovascular: Normal rate, regular rhythm and normal heart sounds      Pulmonary/Chest: Effort normal and breath sounds normal      Slight tenderness right anterior ribs between 6 and 8

## 2018-04-19 LAB
ALBUMIN SERPL-MCNC: 4 G/DL (ref 3.6–5.1)
ALBUMIN/GLOB SERPL: 1.3 (CALC) (ref 1–2.5)
ALP SERPL-CCNC: 180 U/L (ref 40–115)
ALT SERPL-CCNC: 25 U/L (ref 9–46)
AST SERPL-CCNC: 20 U/L (ref 10–35)
BILIRUB SERPL-MCNC: 0.6 MG/DL (ref 0.2–1.2)
BUN SERPL-MCNC: 23 MG/DL (ref 7–25)
BUN SERPL-MCNC: 24 MG/DL (ref 7–25)
BUN/CREAT SERPL: ABNORMAL (CALC) (ref 6–22)
BUN/CREAT SERPL: ABNORMAL (CALC) (ref 6–22)
CALCIUM SERPL-MCNC: 9 MG/DL (ref 8.6–10.3)
CALCIUM SERPL-MCNC: 9 MG/DL (ref 8.6–10.3)
CHLORIDE SERPL-SCNC: 104 MMOL/L (ref 98–110)
CHLORIDE SERPL-SCNC: 105 MMOL/L (ref 98–110)
CHOLEST SERPL-MCNC: 215 MG/DL
CHOLEST/HDLC SERPL: 4.7 (CALC)
CO2 SERPL-SCNC: 27 MMOL/L (ref 20–31)
CO2 SERPL-SCNC: 27 MMOL/L (ref 20–31)
CREAT SERPL-MCNC: 0.92 MG/DL (ref 0.7–1.11)
CREAT SERPL-MCNC: 0.93 MG/DL (ref 0.7–1.11)
GLOBULIN SER CALC-MCNC: 3.2 G/DL (CALC) (ref 1.9–3.7)
GLUCOSE SERPL-MCNC: 117 MG/DL (ref 65–99)
GLUCOSE SERPL-MCNC: 117 MG/DL (ref 65–99)
HDLC SERPL-MCNC: 46 MG/DL
LDLC SERPL CALC-MCNC: 127 MG/DL (CALC)
NONHDLC SERPL-MCNC: 169 MG/DL (CALC)
POTASSIUM SERPL-SCNC: 4.8 MMOL/L (ref 3.5–5.3)
POTASSIUM SERPL-SCNC: 4.9 MMOL/L (ref 3.5–5.3)
PROT SERPL-MCNC: 7.2 G/DL (ref 6.1–8.1)
SL AMB EGFR AFRICAN AMERICAN: 88 ML/MIN/1.73M2
SL AMB EGFR AFRICAN AMERICAN: 89 ML/MIN/1.73M2
SL AMB EGFR NON AFRICAN AMERICAN: 76 ML/MIN/1.73M2
SL AMB EGFR NON AFRICAN AMERICAN: 77 ML/MIN/1.73M2
SODIUM SERPL-SCNC: 138 MMOL/L (ref 135–146)
SODIUM SERPL-SCNC: 139 MMOL/L (ref 135–146)
T4 FREE SERPL-MCNC: 1.2 NG/DL (ref 0.8–1.8)
TRIGL SERPL-MCNC: 270 MG/DL
TSH SERPL-ACNC: 4.22 MIU/L (ref 0.4–4.5)

## 2018-04-23 NOTE — PROGRESS NOTES
4/24/2018    Arian Thomaswater  1935  [id number]    Discussion and Plan    The implications of a upward PSA trend discussed at length  Repeat Lupron injection provided today  In addition, risks and benefits of Casodex reviewed and script provided for combined androgen blockade  He has a follow-up appoint with Medical Oncology in the near term at which point systemic therapies should be considered  He will otherwise return in 3 months with PSA prior to visit  All questions answered at this time  1  Prostate cancer ([facility] [address] )  - PSA Total, Diagnostic; Future  - leuprolide (LUPRON DEPOT 6 MONTH KIT) IM injection kit 45 mg; Inject 45 mg into the shoulder, thigh, or buttocks once   - bicalutamide (CASODEX) 50 mg tablet; Take 1 tablet (50 mg total) by mouth daily  Dispense: 90 tablet; Refill: 3      Assessment      Patient Active Problem List   Diagnosis    Prostate cancer [facility])       History of Present Illness    Damaris Plasencia is a 81 y o  male seen today in regards to a history of [place] 8 prostate cancer status post IMRT (2011) and ADT (initiated 4/3/2017)  The patient presents today with no lower urinary tract complaints  His current PSA 12/27/17 is 6 0  Prior 11/30/17 it was 5 0, 10/5/17 5 8, 8/17/17 was 6 9, 6/29/17 5 6, and 3/3/17 9 0  He has been getting Lupron every 6 months starting 4/3/17  States that he is having hot flashes with these are tolerable  Unfortunately, he had had a bone scan 4/20/17 revealing bony metastasis  He has been evaluated by Hematology Oncology in addition to his Lupron has been started on Xgeva 9/2017  PSA now 11 4 4/18  He denies any constitutional complaints      Urinary Symptom Assessment      Past Medical History  Past Medical History:   Diagnosis Date    Acute bronchitis     Hypertension     Prostate CA ([facility] [address] )     Urinary frequency     UTI (urinary tract infection)        Past Social History  Past Surgical History:   Procedure Laterality Date    CHOLECYSTECTOMY      COLONOSCOPY      [id number] Akila Figueroa)       Past Family History  Family History   Problem Relation Age of Onset    Breast cancer Mother     Prostate cancer Father     Prostate cancer Brother     Stroke Family      CVA    Hypertension Family        Past Social history  Social History     Social History    Marital status: /Civil Union     Spouse name: N/A    Number of children: N/A    Years of education: N/A     Occupational History    Not on file  Social History Main Topics    Smoking status: Former Smoker     Quit date: 1977    Smokeless tobacco: Never Used    Alcohol use Yes      Comment: 1/day   Per [employer]: Social    Drug use: No    Sexual activity: Not on file     Other Topics Concern    Not on file     Social History Narrative    No narrative on file       Current Medications  Current Outpatient Prescriptions   Medication Sig Dispense Refill    aspirin (ECOTRIN LOW STRENGTH) 81 mg EC tablet Take 81 mg by mouth daily      brimonidine-timolol (COMBIGAN) 0 2-0 5 % Administer 1 drop to both eyes every 12 (twelve) hours      brinzolamide (AZOPT) 1 % ophthalmic suspension 1 drop 3 (three) times a day      cholecalciferol (VITAMIN D3) 1,000 units tablet Take 1,000 Units by mouth daily      dorzolamide (TRUSOPT) 2 % ophthalmic solution 1 drop 3 (three) times a day      fluPHENAZine decanoate (PROLIXIN) 25 mg/mL injection Inject into the shoulder, thigh, or buttocks every 14 (fourteen) days      lisinopril (ZESTRIL) 20 mg tablet Take 20 mg by mouth daily      metoprolol tartrate (LOPRESSOR) 25 mg tablet Take 25 mg by mouth every 12 (twelve) hours      Multiple Vitamins-Minerals (PRESERVISION/LUTEIN) CAPS Take by mouth daily      multivitamin (THERAGRAN) TABS Take 1 tablet by mouth daily      travoprost (TRAVATAN Z) 0 004 % ophthalmic solution 1 drop daily at bedtime      triamcinolone (KENALOG) 0 1 % lotion Apply topically as needed      bicalutamide (CASODEX) 50 mg tablet Take 1 tablet (50 mg total) by mouth daily 90 tablet 3     No current facility-administered medications for this visit  Allergies  No Known Allergies    Past Medical History, Social History, Family History, medications and allergies were reviewed  Review of Systems  Review of Systems   Constitutional: Negative  HENT: Negative  Eyes: Negative  Respiratory: Negative  Cardiovascular: Negative  Gastrointestinal: Negative  Endocrine: Negative  Genitourinary: Negative for difficulty urinating and hematuria  Musculoskeletal: Negative  Skin: Negative  Neurological: Negative  Hematological: Negative  Psychiatric/Behavioral: Negative  Vitals  Vitals:    04/24/18 0847   BP: 130/76   Pulse: 72   Weight: 118 kg (261 lb)   Height: 5' 9 5" (1 765 m)         Physical Exam    Physical Exam   Constitutional: He is oriented to person, place, and time  He appears well-developed and well-nourished  HENT:   Head: Normocephalic and atraumatic  Eyes: Pupils are equal, round, and reactive to light  Neck: Normal range of motion  Cardiovascular: Normal rate, regular rhythm and normal heart sounds  Pulmonary/Chest: Effort normal and breath sounds normal  No accessory muscle usage  No respiratory distress  Abdominal: Soft  Normal appearance and bowel sounds are normal  There is no tenderness  Genitourinary: Rectum normal, prostate normal and penis normal  No penile tenderness  Genitourinary Comments: Hydrocele noted  Prostate approximately 20 g and smooth  Musculoskeletal: Normal range of motion  Neurological: He is alert and oriented to person, place, and time  Skin: Skin is warm, dry and intact  Psychiatric: He has a normal mood and affect  His speech is normal  Cognition and memory are normal    Nursing note and vitals reviewed        Results    Below listed labs, pathology results, and radiology images were personally reviewed:    No results found for: PSA  Lab Results Component Value Date    CALCIUM 9 0 04/18/2018     12/27/2017    K 4 8 12/27/2017    CO2 28 12/27/2017     12/27/2017    BUN 24 04/18/2018    CREATININE 0 93 04/18/2018     Lab Results   Component Value Date    WBC 5 6 12/27/2017    HGB 13 8 12/27/2017    HCT 40 1 12/27/2017    MCV 93 5 12/27/2017     12/27/2017       No results found for this or any previous visit (from the past 1 hour(s)) ]

## 2018-04-24 ENCOUNTER — OFFICE VISIT (OUTPATIENT)
Dept: UROLOGY | Facility: AMBULATORY SURGERY CENTER | Age: 83
End: 2018-04-24
Payer: MEDICARE

## 2018-04-24 VITALS
WEIGHT: 261 LBS | SYSTOLIC BLOOD PRESSURE: 130 MMHG | BODY MASS INDEX: 37.37 KG/M2 | HEIGHT: 70 IN | HEART RATE: 72 BPM | DIASTOLIC BLOOD PRESSURE: 76 MMHG

## 2018-04-24 DIAGNOSIS — C61 PROSTATE CANCER (HCC): Primary | ICD-10-CM

## 2018-04-24 PROCEDURE — 99214 OFFICE O/P EST MOD 30 MIN: CPT | Performed by: UROLOGY

## 2018-04-24 PROCEDURE — 96402 CHEMO HORMON ANTINEOPL SQ/IM: CPT | Performed by: UROLOGY

## 2018-04-24 RX ORDER — BICALUTAMIDE 50 MG/1
50 TABLET, FILM COATED ORAL DAILY
Qty: 90 TABLET | Refills: 3 | Status: SHIPPED | OUTPATIENT
Start: 2018-04-24 | End: 2018-01-01

## 2018-04-26 ENCOUNTER — HOSPITAL ENCOUNTER (OUTPATIENT)
Dept: INFUSION CENTER | Facility: CLINIC | Age: 83
Discharge: HOME | End: 2018-04-26
Payer: MEDICARE

## 2018-04-26 VITALS
HEART RATE: 76 BPM | SYSTOLIC BLOOD PRESSURE: 151 MMHG | RESPIRATION RATE: 18 BRPM | DIASTOLIC BLOOD PRESSURE: 94 MMHG | TEMPERATURE: 97.8 F

## 2018-04-26 PROCEDURE — 96401 CHEMO ANTI-NEOPL SQ/IM: CPT

## 2018-04-26 RX ADMIN — DENOSUMAB 120 MG: 120 INJECTION SUBCUTANEOUS at 09:46

## 2018-04-26 NOTE — PLAN OF CARE

## 2018-05-16 ENCOUNTER — OFFICE VISIT (OUTPATIENT)
Dept: HEMATOLOGY ONCOLOGY | Facility: CLINIC | Age: 83
End: 2018-05-16
Payer: MEDICARE

## 2018-05-16 VITALS
TEMPERATURE: 98.7 F | RESPIRATION RATE: 16 BRPM | HEIGHT: 70 IN | OXYGEN SATURATION: 98 % | WEIGHT: 263.4 LBS | SYSTOLIC BLOOD PRESSURE: 142 MMHG | BODY MASS INDEX: 37.71 KG/M2 | HEART RATE: 59 BPM | DIASTOLIC BLOOD PRESSURE: 84 MMHG

## 2018-05-16 DIAGNOSIS — C61 PROSTATE CANCER (HCC): Primary | ICD-10-CM

## 2018-05-16 DIAGNOSIS — C79.51 BONE METASTASES (HCC): ICD-10-CM

## 2018-05-16 PROCEDURE — 99215 OFFICE O/P EST HI 40 MIN: CPT | Performed by: INTERNAL MEDICINE

## 2018-05-16 NOTE — LETTER
May 16, 2697     [facility], DO  [address] [facility]  [address]    Patient: Ja Telelz   YOB: 1935   Date of Visit: 5/16/2018       Dear Dr Verónica Edgar: Thank you for referring Matthew Bernard to me for evaluation  Below are my notes for this consultation  If you have questions, please do not hesitate to call me  I look forward to following your patient along with you  Sincerely,        Lee Ann Anthony MD        CC: No Recipients  Lee Ann Anthony MD  5/16/2018 11:37 PM  Sign at close encounter  Follow-up visit for cancer of prostate with bony metastases  HPI:   Ja Tellez is a 81 y o  male  He is here with his wife  He is on Lupron Depot shot once every 6 months from his urologist   He is on Xgeva once every 3 months He had a tooth pulled and Xgeva was resumed after area had healed  He knows to call us before getting dental work or if he has any dental problem and also to let his dentist/oral surgeon know that he has been on Xgeva because of risk of 0 [place]  Lately there has been increase in PSA from 5 to 11 4  Patient does not have bone pains  Once again we discussed various options, adding Taxotere, adding Zytiga plus prednisone, Provenge, and xofigo  Discussed all these options in detail  He will go for follow-up bone scan and CT scans and there is no disease outside the bones he will go for xofigo  Patient has signed informed consent for xofigo after getting verbal and printed information from myself and from my nurse  PSA will be checked and testosterone level will be checked  Lupron Depot was  started in April 2017  Piedad Bustillo PSA had come down to 5 0    He was responding to hormone therapy      He has some tiredness and minor arthritic symptoms    No Known Allergies    ROS:  05/16/18 Reviewed 13 systems:  Presently no headaches, seizures, dizziness, diplopia, dysphagia, hoarseness, chest pain, palpitations, shortness of breath, cough, hemoptysis, abdominal pain, nausea, vomiting, change in bowel habits, melena, hematuria, fever, chills, bleeding, bone pains, skin rash, weight loss,  weakness, numbness,  claudication and gait problem  No frequent infections  Not unusually sensitive to heat or cold  No swelling of the ankles  No swollen glands  Patient is anxious  Other symptoms are in HPI        /84 (BP Location: Left arm, Patient Position: Sitting, Cuff Size: Adult)   Pulse 59   Temp 98 7 °F (37 1 °C)   Resp 16   Ht 5' 9 5" (1 765 m)   Wt 119 kg (263 lb 6 4 oz)   SpO2 98%   BMI 38 34 kg/m²      Physical Exam:  Alert, oriented, not in distress, no icterus, no oral thrush, no palpable neck mass, clear lung fields, regular heart rate, systolic murmur, abdomen  soft and non tender, no palpable abdominal mass, no ascites, no edema of ankles, no calf tenderness, no focal neurological deficit, no skin rash, no palpable lymphadenopathy, good arterial pulses, no clubbing  Patient is anxious  Performance status 1      IMAGING:  No orders to display       LABS:  Results for orders placed or performed in visit on 04/18/18   Lipid panel   Result Value Ref Range    Total Cholesterol 215 (H) <200 mg/dL    SL AMB HDL CHOLESTEROL 46 >40 mg/dL    Triglycerides 270 (H) <150 mg/dL    SL AMB LDL-CHOLESTEROL 127 (H) mg/dL (calc)    SL AMB CHOL/HDLC RATIO 4 7 <5 0 (calc)    SL AMB NON HDL CHOLESTEROL 169 (H) <130 mg/dL (calc)   Basic metabolic panel   Result Value Ref Range    SL AMB GLUCOSE 117 (H) 65 - 99 mg/dL    BUN 23 7 - 25 mg/dL    Creatinine, Serum 0 92 0 70 - 1 11 mg/dL    eGFR Non  77 > OR = 60 mL/min/1 73m2    SL AMB EGFR  89 > OR = 60 mL/min/1 73m2    SL AMB BUN/CREATININE RATIO NOT APPLICABLE 6 - 22 (calc)    SL AMB SODIUM 138 135 - 146 mmol/L    SL AMB POTASSIUM 4 8 3 5 - 5 3 mmol/L    SL AMB CHLORIDE 104 98 - 110 mmol/L    SL AMB CARBON DIOXIDE 27 20 - 31 mmol/L    SL AMB CALCIUM 9 0 8 6 - 10 3 mg/dL   T4, free Result Value Ref Range    Free t4 1 2 0 8 - 1 8 ng/dL   TSH, 3rd generation   Result Value Ref Range    TSH 4 22 0 40 - 4 50 mIU/L     Labs, Imaging, & Other studies:   All pertinent labs and imaging studies were personally reviewed    Lab Results   Component Value Date     12/27/2017    K 4 8 12/27/2017     12/27/2017    CO2 28 12/27/2017    BUN 24 04/18/2018    CREATININE 0 93 04/18/2018    CALCIUM 9 0 04/18/2018    AST 23 12/27/2017    ALT 22 12/27/2017    ALKPHOS 75 12/27/2017    PROT 7 0 12/27/2017    BILITOT 0 5 12/27/2017    PSA 11 4      Reviewed and discussed with patient  Assessment and plan: Colby Oliver is a 81 y o  male  He is here with his wife  He is on Lupron Depot shot once every 6 months from his urologist   He is on Xgeva once every 3 months He had a tooth pulled and Xgeva was resumed after area had healed  He knows to call us before getting dental work or if he has any dental problem and also to let his dentist/oral surgeon know that he has been on Xgeva because of risk of 0 [place]  Lately there has been increase in PSA from 5 to 11 4  Patient does not have bone pains  Once again we discussed various options, adding Taxotere, adding Zytiga plus prednisone, Provenge, and xofigo  Discussed all these options in detail  He will go for follow-up bone scan and CT scans and there is no disease outside the bones he will go for xofigo  Patient has signed informed consent for xofigo after getting verbal and printed information from myself and from my nurse  PSA will be checked and testosterone level will be checked  Lupron Depot was  started in April 2017  Hyun Burgos PSA had come down to 5 0    He was responding to hormone therapy  He has some tiredness and minor arthritic symptoms    Physical examination and test results are as recorded and discussed  Options as described above  Plan as described above  Condition and plan discussed in detail  Questions answered    Patient has follow-up appointment in our office after the scans and blood tests  1  Prostate cancer ([facility] [address] )    - PSA Total, Diagnostic; Future  - NM bone scan whole body; Future  - CBC and differential  - Comprehensive metabolic panel  - Ambulatory Referral to GI Radiology; Future    2  Bone metastases (HCC)    - PSA Total, Diagnostic; Future  - Testosterone, Total, LC/MS/MS; Future  - CT chest abdomen pelvis w wo contrast; Future  - CBC and differential  - Comprehensive metabolic panel  - Ambulatory Referral to GI Radiology; Future          Patient voiced understanding and agreement in the discussion  Counseling / Coordination of Care   Greater than 50% of total time was spent with the patient and / or family counseling and / or coordination of care

## 2018-05-16 NOTE — PROGRESS NOTES
Follow-up visit for cancer of prostate with bony metastases  HPI:   Kamar Edwards is a 81 y o  male  He is here with his wife  He is on Lupron Depot shot once every 6 months from his urologist   He is on Xgeva once every 3 months He had a tooth pulled and Xgeva was resumed after area had healed  He knows to call us before getting dental work or if he has any dental problem and also to let his dentist/oral surgeon know that he has been on Xgeva because of risk of 0 [place]  Lately there has been increase in PSA from 5 to 11 4  Patient does not have bone pains  Once again we discussed various options, adding Taxotere, adding Zytiga plus prednisone, Provenge, and xofigo  Discussed all these options in detail  He will go for follow-up bone scan and CT scans and there is no disease outside the bones he will go for xofigo  Patient has signed informed consent for xofigo after getting verbal and printed information from myself and from my nurse  PSA will be checked and testosterone level will be checked  Lupron Depot was  started in April 2017  Paxton Mcgowan PSA had come down to 5 0    He was responding to hormone therapy  He has some tiredness and minor arthritic symptoms    No Known Allergies    ROS:  05/16/18 Reviewed 13 systems:  Presently no headaches, seizures, dizziness, diplopia, dysphagia, hoarseness, chest pain, palpitations, shortness of breath, cough, hemoptysis, abdominal pain, nausea, vomiting, change in bowel habits, melena, hematuria, fever, chills, bleeding, bone pains, skin rash, weight loss,  weakness, numbness,  claudication and gait problem  No frequent infections  Not unusually sensitive to heat or cold  No swelling of the ankles  No swollen glands  Patient is anxious   Other symptoms are in HPI        /84 (BP Location: Left arm, Patient Position: Sitting, Cuff Size: Adult)   Pulse 59   Temp 98 7 °F (37 1 °C)   Resp 16   Ht 5' 9 5" (1 765 m)   Wt 119 kg (263 lb 6 4 oz)   SpO2 98%   BMI 38 34 kg/m²     Physical Exam:  Alert, oriented, not in distress, no icterus, no oral thrush, no palpable neck mass, clear lung fields, regular heart rate, systolic murmur, abdomen  soft and non tender, no palpable abdominal mass, no ascites, no edema of ankles, no calf tenderness, no focal neurological deficit, no skin rash, no palpable lymphadenopathy, good arterial pulses, no clubbing  Patient is anxious  Performance status 1      IMAGING:  No orders to display       LABS:  Results for orders placed or performed in visit on 04/18/18   Lipid panel   Result Value Ref Range    Total Cholesterol 215 (H) <200 mg/dL    SL AMB HDL CHOLESTEROL 46 >40 mg/dL    Triglycerides 270 (H) <150 mg/dL    SL AMB LDL-CHOLESTEROL 127 (H) mg/dL (calc)    SL AMB CHOL/HDLC RATIO 4 7 <5 0 (calc)    SL AMB NON HDL CHOLESTEROL 169 (H) <130 mg/dL (calc)   Basic metabolic panel   Result Value Ref Range    SL AMB GLUCOSE 117 (H) 65 - 99 mg/dL    BUN 23 7 - 25 mg/dL    Creatinine, Serum 0 92 0 70 - 1 11 mg/dL    eGFR Non  77 > OR = 60 mL/min/1 73m2    SL AMB EGFR  89 > OR = 60 mL/min/1 73m2    SL AMB BUN/CREATININE RATIO NOT APPLICABLE 6 - 22 (calc)    SL AMB SODIUM 138 135 - 146 mmol/L    SL AMB POTASSIUM 4 8 3 5 - 5 3 mmol/L    SL AMB CHLORIDE 104 98 - 110 mmol/L    SL AMB CARBON DIOXIDE 27 20 - 31 mmol/L    SL AMB CALCIUM 9 0 8 6 - 10 3 mg/dL   T4, free   Result Value Ref Range    Free t4 1 2 0 8 - 1 8 ng/dL   TSH, 3rd generation   Result Value Ref Range    TSH 4 22 0 40 - 4 50 mIU/L     Labs, Imaging, & Other studies:   All pertinent labs and imaging studies were personally reviewed    Lab Results   Component Value Date     12/27/2017    K 4 8 12/27/2017     12/27/2017    CO2 28 12/27/2017    BUN 24 04/18/2018    CREATININE 0 93 04/18/2018    CALCIUM 9 0 04/18/2018    AST 23 12/27/2017    ALT 22 12/27/2017    ALKPHOS 75 12/27/2017    PROT 7 0 12/27/2017    BILITOT 0 5 12/27/2017    PSA 11 4      Reviewed and discussed with patient  Assessment and plan: Moon Regalado is a 81 y o  male  He is here with his wife  He is on Lupron Depot shot once every 6 months from his urologist   He is on Xgeva once every 3 months He had a tooth pulled and Xgeva was resumed after area had healed  He knows to call us before getting dental work or if he has any dental problem and also to let his dentist/oral surgeon know that he has been on Xgeva because of risk of 0 [place]  Lately there has been increase in PSA from 5 to 11 4  Patient does not have bone pains  Once again we discussed various options, adding Taxotere, adding Zytiga plus prednisone, Provenge, and xofigo  Discussed all these options in detail  He will go for follow-up bone scan and CT scans and there is no disease outside the bones he will go for xofigo  Patient has signed informed consent for xofigo after getting verbal and printed information from myself and from my nurse  PSA will be checked and testosterone level will be checked  Lupron Depot was  started in April 2017  Radha Barlow PSA had come down to 5 0    He was responding to hormone therapy  He has some tiredness and minor arthritic symptoms    Physical examination and test results are as recorded and discussed  Options as described above  Plan as described above  Condition and plan discussed in detail  Questions answered  Patient has follow-up appointment in our office after the scans and blood tests  1  Prostate cancer ([facility] [address] )    - PSA Total, Diagnostic; Future  - NM bone scan whole body; Future  - CBC and differential  - Comprehensive metabolic panel  - Ambulatory Referral to GI Radiology; Future    2  Bone metastases (HCC)    - PSA Total, Diagnostic; Future  - Testosterone, Total, LC/MS/MS; Future  - CT chest abdomen pelvis w wo contrast; Future  - CBC and differential  - Comprehensive metabolic panel  - Ambulatory Referral to GI Radiology;  Future          Patient voiced understanding and agreement in the discussion  Counseling / Coordination of Care   Greater than 50% of total time was spent with the patient and / or family counseling and / or coordination of care

## 2018-05-18 ENCOUNTER — OFFICE VISIT (OUTPATIENT)
Dept: FAMILY MEDICINE CLINIC | Facility: CLINIC | Age: 83
End: 2018-05-18
Payer: MEDICARE

## 2018-05-18 VITALS
WEIGHT: 262.2 LBS | SYSTOLIC BLOOD PRESSURE: 126 MMHG | DIASTOLIC BLOOD PRESSURE: 66 MMHG | BODY MASS INDEX: 37.54 KG/M2 | HEIGHT: 70 IN

## 2018-05-18 DIAGNOSIS — I12.9 BENIGN HYPERTENSIVE CKD, STAGE 1-4 OR UNSPECIFIED CHRONIC KIDNEY DISEASE: Primary | ICD-10-CM

## 2018-05-18 DIAGNOSIS — C79.9 METASTATIC DISEASE (HCC): ICD-10-CM

## 2018-05-18 DIAGNOSIS — N18.2 CKD (CHRONIC KIDNEY DISEASE) STAGE 2, GFR 60-89 ML/MIN: ICD-10-CM

## 2018-05-18 DIAGNOSIS — R73.09 OTHER ABNORMAL GLUCOSE: ICD-10-CM

## 2018-05-18 DIAGNOSIS — E78.5 HYPERLIPIDEMIA, ACQUIRED: ICD-10-CM

## 2018-05-18 PROBLEM — C79.51 BONE METASTASES (HCC): Status: ACTIVE | Noted: 2017-08-09

## 2018-05-18 PROCEDURE — 99214 OFFICE O/P EST MOD 30 MIN: CPT | Performed by: FAMILY MEDICINE

## 2018-05-18 NOTE — PROGRESS NOTES
Assessment/Plan:    Patient's blood pressure and GFR regarding CKD are stable  Patient's A1c will be done in 6 months  His recent blood sugar was just above 99 again  Patient's triglycerides have improved since last visit  He is on no medication own will have a repeat lipid profile in 6 months  The main concern is the patient does have a history of prostate cancer and apparently his PSAs have been going up  He is under care with [facility]  He has scans scheduled for Tuesday to assess for the possibility of metastatic disease  Diagnoses and all orders for this visit:    Benign hypertensive CKD, stage 1-4 or unspecified chronic kidney disease    CKD (chronic kidney disease) stage 2, GFR 60-89 ml/min  -     Comprehensive metabolic panel; Future  -     HEMOGLOBIN A1C W/ EAG ESTIMATION; Future    Other abnormal glucose  -     HEMOGLOBIN A1C W/ EAG ESTIMATION; Future    Metastatic disease (HCC)    Hyperlipidemia, acquired  -     Lipid panel; Future        There are no Patient Instructions on file for this visit  Subjective:        Patient ID: Kayla Niño is a 81 y o  male  Chief Complaint   Patient presents with    Follow-up     HTN and cholesterol       Patient here for 6 month check regarding labs and blood pressure  Unfortunately he has been following with [facility] regarding prostate cancer  There is a concerned he is developing worsening of disease with metastatic changes  The following portions of the patient's history were reviewed and updated as appropriate: past medical history, past surgical history and problem list       Review of Systems   Constitutional: Negative for appetite change, fatigue, fever and unexpected weight change  HENT: Negative for congestion, ear pain, postnasal drip, rhinorrhea, sinus pain, sinus pressure and sore throat  Eyes: Negative for redness and visual disturbance     Respiratory: Negative for chest tightness and shortness of breath  Cardiovascular: Negative for chest pain, palpitations and leg swelling  Gastrointestinal: Negative for abdominal distention, abdominal pain, diarrhea and nausea  Endocrine: Negative for cold intolerance and heat intolerance  Genitourinary: Negative for dysuria and hematuria  Musculoskeletal: Negative for arthralgias, gait problem and myalgias  Skin: Negative for pallor and rash  Neurological: Negative for dizziness and headaches  Psychiatric/Behavioral: Negative for behavioral problems  The patient is not nervous/anxious  Objective:  /66 (BP Location: Left arm, Patient Position: Sitting, Cuff Size: Standard)   Ht 5' 9 5" (1 765 m)   Wt 119 kg (262 lb 3 2 oz)   BMI 38 17 kg/m²        Physical Exam   Constitutional: He is oriented to person, place, and time  He appears well-nourished  No distress  Obese   HENT:   Head: Normocephalic and atraumatic  Mouth/Throat: Oropharynx is clear and moist    Eyes: Conjunctivae and EOM are normal  Pupils are equal, round, and reactive to light  No scleral icterus  Neck: Normal range of motion  Neck supple  No thyromegaly present  Cardiovascular: Normal rate, regular rhythm and intact distal pulses  No murmur heard  Pulmonary/Chest: Effort normal and breath sounds normal  He has no wheezes  Abdominal: Soft  He exhibits no distension  There is no tenderness  Musculoskeletal: He exhibits no edema  Lymphadenopathy:     He has no cervical adenopathy  Neurological: He is alert and oriented to person, place, and time  Skin: Skin is warm  No pallor  Psychiatric: He has a normal mood and affect  His behavior is normal  Thought content normal    Nursing note and vitals reviewed

## 2018-05-22 ENCOUNTER — HOSPITAL ENCOUNTER (OUTPATIENT)
Dept: NUCLEAR MEDICINE | Facility: HOSPITAL | Age: 83
Discharge: HOME | End: 2018-05-22
Attending: INTERNAL MEDICINE
Payer: MEDICARE

## 2018-05-22 ENCOUNTER — HOSPITAL ENCOUNTER (OUTPATIENT)
Dept: CT IMAGING | Facility: HOSPITAL | Age: 83
Discharge: HOME | End: 2018-05-22
Attending: INTERNAL MEDICINE
Payer: MEDICARE

## 2018-05-22 DIAGNOSIS — C61 PROSTATE CANCER (HCC): ICD-10-CM

## 2018-05-22 DIAGNOSIS — C79.51 BONE METASTASES (HCC): ICD-10-CM

## 2018-05-22 PROCEDURE — 74177 CT ABD & PELVIS W/CONTRAST: CPT

## 2018-05-22 PROCEDURE — A9503 TC99M MEDRONATE: HCPCS

## 2018-05-22 PROCEDURE — 78306 BONE IMAGING WHOLE BODY: CPT

## 2018-05-22 PROCEDURE — 71260 CT THORAX DX C+: CPT

## 2018-05-22 RX ADMIN — IOHEXOL 100 ML: 350 INJECTION, SOLUTION INTRAVENOUS at 11:11

## 2018-05-25 ENCOUNTER — HOSPITAL ENCOUNTER (OUTPATIENT)
Dept: RADIOLOGY | Age: 83
Discharge: HOME | End: 2018-05-25

## 2018-05-25 DIAGNOSIS — C79.51 SECONDARY MALIGNANT NEOPLASM OF BONE AND BONE MARROW (HCC): ICD-10-CM

## 2018-05-25 DIAGNOSIS — C79.52 SECONDARY MALIGNANT NEOPLASM OF BONE AND BONE MARROW (HCC): ICD-10-CM

## 2018-05-28 DIAGNOSIS — C61 MALIGNANT NEOPLASM OF PROSTATE (HCC): ICD-10-CM

## 2018-05-31 DIAGNOSIS — C61 PROSTATE CANCER (HCC): Primary | ICD-10-CM

## 2018-05-31 DIAGNOSIS — R11.0 NAUSEA IN ADULT: Primary | ICD-10-CM

## 2018-06-01 RX ORDER — ONDANSETRON HYDROCHLORIDE 8 MG/1
8 TABLET, FILM COATED ORAL EVERY 12 HOURS PRN
Qty: 30 TABLET | Refills: 1 | Status: SHIPPED | OUTPATIENT
Start: 2018-06-01 | End: 2018-11-07 | Stop reason: SDUPTHER

## 2018-06-14 ENCOUNTER — HOSPITAL ENCOUNTER (OUTPATIENT)
Dept: RADIOLOGY | Age: 83
Discharge: HOME | End: 2018-06-14
Payer: MEDICARE

## 2018-06-14 DIAGNOSIS — C79.52 SECONDARY MALIGNANT NEOPLASM OF BONE AND BONE MARROW (HCC): ICD-10-CM

## 2018-06-14 DIAGNOSIS — C79.51 SECONDARY MALIGNANT NEOPLASM OF BONE AND BONE MARROW (HCC): ICD-10-CM

## 2018-06-14 PROCEDURE — 79101 NUCLEAR RX IV ADMIN: CPT

## 2018-06-14 PROCEDURE — A9606 RADIUM RA223 DICHLORIDE THER: HCPCS

## 2018-06-16 LAB
ALBUMIN SERPL-MCNC: 3.9 G/DL (ref 3.6–5.1)
ALBUMIN/GLOB SERPL: 1.2 (CALC) (ref 1–2.5)
ALP SERPL-CCNC: 257 U/L (ref 40–115)
ALT SERPL-CCNC: 28 U/L (ref 9–46)
AST SERPL-CCNC: 24 U/L (ref 10–35)
BASOPHILS # BLD AUTO: 32 CELLS/UL (ref 0–200)
BASOPHILS NFR BLD AUTO: 0.6 %
BILIRUB SERPL-MCNC: 0.5 MG/DL (ref 0.2–1.2)
BUN SERPL-MCNC: 21 MG/DL (ref 7–25)
BUN/CREAT SERPL: ABNORMAL (CALC) (ref 6–22)
CALCIUM SERPL-MCNC: 9 MG/DL (ref 8.6–10.3)
CHLORIDE SERPL-SCNC: 103 MMOL/L (ref 98–110)
CO2 SERPL-SCNC: 27 MMOL/L (ref 20–31)
CREAT SERPL-MCNC: 0.9 MG/DL (ref 0.7–1.11)
EOSINOPHIL # BLD AUTO: 221 CELLS/UL (ref 15–500)
EOSINOPHIL NFR BLD AUTO: 4.1 %
ERYTHROCYTE [DISTWIDTH] IN BLOOD BY AUTOMATED COUNT: 12.8 % (ref 11–15)
GLOBULIN SER CALC-MCNC: 3.2 G/DL (CALC) (ref 1.9–3.7)
GLUCOSE SERPL-MCNC: 114 MG/DL (ref 65–99)
HCT VFR BLD AUTO: 38.6 % (ref 38.5–50)
HGB BLD-MCNC: 12.8 G/DL (ref 13.2–17.1)
LYMPHOCYTES # BLD AUTO: 1528 CELLS/UL (ref 850–3900)
LYMPHOCYTES NFR BLD AUTO: 28.3 %
MCH RBC QN AUTO: 30.4 PG (ref 27–33)
MCHC RBC AUTO-ENTMCNC: 33.2 G/DL (ref 32–36)
MCV RBC AUTO: 91.7 FL (ref 80–100)
MONOCYTES # BLD AUTO: 572 CELLS/UL (ref 200–950)
MONOCYTES NFR BLD AUTO: 10.6 %
NEUTROPHILS # BLD AUTO: 3046 CELLS/UL (ref 1500–7800)
NEUTROPHILS NFR BLD AUTO: 56.4 %
PLATELET # BLD AUTO: 218 THOUSAND/UL (ref 140–400)
PMV BLD REES-ECKER: 9.5 FL (ref 7.5–12.5)
POTASSIUM SERPL-SCNC: 4.8 MMOL/L (ref 3.5–5.3)
PROT SERPL-MCNC: 7.1 G/DL (ref 6.1–8.1)
PSA SERPL-MCNC: 22.3 NG/ML
RBC # BLD AUTO: 4.21 MILLION/UL (ref 4.2–5.8)
SL AMB EGFR AFRICAN AMERICAN: 92 ML/MIN/1.73M2
SL AMB EGFR NON AFRICAN AMERICAN: 79 ML/MIN/1.73M2
SODIUM SERPL-SCNC: 137 MMOL/L (ref 135–146)
TESTOST FREE SERPL-MCNC: 0.7 PG/ML (ref 30–135)
TESTOST SERPL-MCNC: 11 NG/DL (ref 250–1100)
WBC # BLD AUTO: 5.4 THOUSAND/UL (ref 3.8–10.8)

## 2018-06-20 ENCOUNTER — OFFICE VISIT (OUTPATIENT)
Dept: HEMATOLOGY ONCOLOGY | Facility: CLINIC | Age: 83
End: 2018-06-20
Payer: MEDICARE

## 2018-06-20 VITALS
DIASTOLIC BLOOD PRESSURE: 68 MMHG | BODY MASS INDEX: 37.59 KG/M2 | SYSTOLIC BLOOD PRESSURE: 126 MMHG | OXYGEN SATURATION: 96 % | TEMPERATURE: 99 F | HEIGHT: 70 IN | RESPIRATION RATE: 17 BRPM | WEIGHT: 262.6 LBS | HEART RATE: 80 BPM

## 2018-06-20 DIAGNOSIS — C61 PROSTATE CANCER (HCC): Primary | ICD-10-CM

## 2018-06-20 DIAGNOSIS — C79.9 METASTATIC DISEASE (HCC): ICD-10-CM

## 2018-06-20 DIAGNOSIS — C79.51 BONE METASTASES (HCC): ICD-10-CM

## 2018-06-20 PROCEDURE — 99214 OFFICE O/P EST MOD 30 MIN: CPT | Performed by: INTERNAL MEDICINE

## 2018-06-20 NOTE — PROGRESS NOTES
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  He is on Lupron Depot shot once every 6 months from his urologist   He is on Xgeva once every 3 months    He has been taking vitamin-D and calcium for Xgeva  No more problem with his teeth  In  June 2018 patient was started on xofigo and he will have that once every 4 weeks for 6 months  Blood counts are being monitored on xofigo  Occasionally has discomfort in the upper flank areas bilaterally  Either Tylenol or Motrin takes care of the discomfort  He knows not to take Motrin empty stomach  Lupron Depot was  started in April 2017  Gillespie Piles PSA had come down to 5 0    He was responding to hormone therapy  In 2018 disease progressed in the bones and also PSA continued to rise  He has some tiredness and minor arthritic symptoms            Current Outpatient Prescriptions:     aspirin (ECOTRIN LOW STRENGTH) 81 mg EC tablet, Take 81 mg by mouth daily, Disp: , Rfl:     bicalutamide (CASODEX) 50 mg tablet, Take 1 tablet (50 mg total) by mouth daily, Disp: 90 tablet, Rfl: 3    brimonidine-timolol (COMBIGAN) 0 2-0 5 %, Administer 1 drop to both eyes every 12 (twelve) hours, Disp: , Rfl:     brinzolamide (AZOPT) 1 % ophthalmic suspension, 1 drop 3 (three) times a day, Disp: , Rfl:     cholecalciferol (VITAMIN D3) 1,000 units tablet, Take 1,000 Units by mouth daily, Disp: , Rfl:     dorzolamide (TRUSOPT) 2 % ophthalmic solution, 1 drop 3 (three) times a day, Disp: , Rfl:     lisinopril (ZESTRIL) 20 mg tablet, Take 20 mg by mouth daily, Disp: , Rfl:     metoprolol tartrate (LOPRESSOR) 25 mg tablet, Take 25 mg by mouth every 12 (twelve) hours, Disp: , Rfl:     Multiple Vitamins-Minerals (PRESERVISION/LUTEIN) CAPS, Take by mouth daily, Disp: , Rfl:     multivitamin (THERAGRAN) TABS, Take 1 tablet by mouth daily, Disp: , Rfl:     ondansetron (ZOFRAN) 8 mg tablet, Take 1 tablet (8 mg total) by mouth every 12 (twelve) hours as needed for nausea or vomiting, Disp: 30 tablet, Rfl: 1    travoprost (TRAVATAN Z) 0 004 % ophthalmic solution, 1 drop daily at bedtime, Disp: , Rfl:     triamcinolone (KENALOG) 0 1 % lotion, Apply topically as needed, Disp: , Rfl:     No Known Allergies     No history exists  ROS:  06/20/18 Reviewed 13 systems:  Presently no headaches, seizures, dizziness, diplopia, dysphagia, hoarseness, chest pain, palpitations, shortness of breath, cough, hemoptysis, abdominal pain, nausea, vomiting, change in bowel habits, melena, hematuria, fever, chills, bleeding,  skin rash, weight loss,   weakness, numbness,  claudication and gait problem  No frequent infections  Not unusually sensitive to heat or cold  No swelling of the ankles  No swollen glands  Patient is anxious  Other symptoms are in HPI        /68 (BP Location: Left arm, Cuff Size: Adult)   Pulse 80   Temp 99 °F (37 2 °C) (Tympanic)   Resp 17   Ht 5' 9 5" (1 765 m)   Wt 119 kg (262 lb 9 6 oz)   SpO2 96%   BMI 38 22 kg/m²     Physical Exam:  Alert, oriented, not in distress, no icterus, no oral thrush, no palpable neck mass, clear lung fields, regular heart rate, abdomen  soft and non tender, no palpable abdominal mass, no ascites, no edema of ankles, no calf tenderness, no focal neurological deficit, no skin rash, no palpable lymphadenopathy, good arterial pulses, no clubbing  Patient is anxious  Performance status 1      IMAGING:      LABS:  Results for orders placed or performed in visit on 06/11/18   Comprehensive metabolic panel   Result Value Ref Range    SL AMB GLUCOSE 114 (H) 65 - 99 mg/dL    BUN 21 7 - 25 mg/dL    Creatinine, Serum 0 90 0 70 - 1 11 mg/dL    eGFR Non  79 > OR = 60 mL/min/1 73m2    SL AMB EGFR  92 > OR = 60 mL/min/1 73m2    SL AMB BUN/CREATININE RATIO NOT APPLICABLE 6 - 22 (calc)    SL AMB SODIUM 137 135 - 146 mmol/L    SL AMB POTASSIUM 4 8 3 5 - 5 3 mmol/L    SL AMB CHLORIDE 103 98 - 110 mmol/L    SL AMB CARBON DIOXIDE 27 20 - 31 mmol/L    SL AMB CALCIUM 9 0 8 6 - 10 3 mg/dL    SL AMB PROTEIN, TOTAL 7 1 6 1 - 8 1 g/dL    Serum Albumin 3 9 3 6 - 5 1 g/dL    SL AMB GLOBULIN 3 2 1 9 - 3 7 g/dL (calc)    SL AMB ALBUMIN/GLOBULIN RATIO 1 2 1 0 - 2 5 (calc)    SL AMB BILIRUBIN, TOTAL 0 5 0 2 - 1 2 mg/dL    SL AMB ALKALINE PHOSPHATASE 257 (H) 40 - 115 U/L    SL AMB AST 24 10 - 35 U/L    SL AMB ALT 28 9 - 46 U/L   CBC and differential   Result Value Ref Range    SL AMB LAB WHITE BLOOD CELL COUNT 5 4 3 8 - 10 8 Thousand/uL    SL AMB LAB RED BLOOD CELLS 4 21 4 20 - 5 80 Million/uL    Hemoglobin 12 8 (L) 13 2 - 17 1 g/dL    Hematocrit 38 6 38 5 - 50 0 %    MCV 91 7 80 0 - 100 0 fL    MCH 30 4 27 0 - 33 0 pg    MCHC 33 2 32 0 - 36 0 g/dL    RDW 12 8 11 0 - 15 0 %    Platelet Count 258 197 - 400 Thousand/uL    SL AMB MPV 9 5 7 5 - 12 5 fL    Neutrophils (Absolute) 3,046 1,500 - 7,800 cells/uL    Lymphocytes (Absolute) 1,528 850 - 3,900 cells/uL    Monocytes (Absolute) 572 200 - 950 cells/uL    Eosinophils (Absolute) 221 15 - 500 cells/uL    Basophils (Absolute) 32 0 - 200 cells/uL    Neutrophils 56 4 %    Lymphocytes 28 3 %    Monocytes 10 6 %    Eosinophils 4 1 %    Basophils 0 6 %   PSA, Total Screen   Result Value Ref Range    Prostate Specific Antigen Total 22 3 (H) < OR = 4 0 ng/mL   Testosterone, free, total   Result Value Ref Range    Testosterone, Total, LC/MS      Testosterone, Free       Labs, Imaging, & Other studies:   All pertinent labs and imaging studies were personally reviewed      Reviewed and discussed with patient  Assessment and plan:  Patient is here with his wife  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  He is on Lupron Depot shot once every 6 months from his urologist   He is on Xgeva once every 3 months    He has been taking vitamin-D and calcium for Xgeva  No more problem with his teeth     In  June 2018 patient was started on xofigo and he will have that once every 4 weeks for 6 months  Blood counts are being monitored on xofigo  Occasionally has discomfort in the upper flank areas bilaterally  Either Tylenol or Motrin takes care of the discomfort  He knows not to take Motrin empty stomach  Lupron Depot was  started in April 2017  Feralvin Alvaradolon PSA had come down to 5 0    He was responding to hormone therapy  In 2018 disease progressed in the bones and also PSA continued to rise  He has some tiredness and minor arthritic symptoms    Physical examination and test results are as recorded and discussed  No change in therapy at present  Blood counts are being monitored frequently because of xofigo and Periodically blood chemistry and PSA  He knows there are other options after xofigo  Condition and plan discussed in detail  Questions answered  1  Prostate cancer ([facility] )      2  Bone metastases ([facility] )      3  Metastatic disease [facility])            Patient voiced understanding and agreement in the discussion  Counseling / Coordination of Care   Greater than 50% of total time was spent with the patient and / or family counseling and / or coordination of care

## 2018-06-20 NOTE — LETTER
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  He is on Lupron Depot shot once every 6 months from his urologist   He is on Xgeva once every 3 months    He has been taking vitamin-D and calcium for Xgeva  No more problem with his teeth  Last month, May 2018 she was started on xofigo and he will have that once every 4 weeks for 6 months  Blood counts are being monitored on xofigo  Occasionally has discomfort in the upper flank areas bilaterally  Either Tylenol or Motrin takes care of the discomfort  He knows not to take Motrin empty stomach  Lupron Depot was  started in April 2017  Radha Settle PSA had come down to 5 0    He was responding to hormone therapy  In 2018 disease progressed in the bones and also PSA continued to rise  He has some tiredness and minor arthritic symptoms            Current Outpatient Prescriptions:     aspirin (ECOTRIN LOW STRENGTH) 81 mg EC tablet, Take 81 mg by mouth daily, Disp: , Rfl:     bicalutamide (CASODEX) 50 mg tablet, Take 1 tablet (50 mg total) by mouth daily, Disp: 90 tablet, Rfl: 3    brimonidine-timolol (COMBIGAN) 0 2-0 5 %, Administer 1 drop to both eyes every 12 (twelve) hours, Disp: , Rfl:     brinzolamide (AZOPT) 1 % ophthalmic suspension, 1 drop 3 (three) times a day, Disp: , Rfl:     cholecalciferol (VITAMIN D3) 1,000 units tablet, Take 1,000 Units by mouth daily, Disp: , Rfl:     dorzolamide (TRUSOPT) 2 % ophthalmic solution, 1 drop 3 (three) times a day, Disp: , Rfl:     lisinopril (ZESTRIL) 20 mg tablet, Take 20 mg by mouth daily, Disp: , Rfl:     metoprolol tartrate (LOPRESSOR) 25 mg tablet, Take 25 mg by mouth every 12 (twelve) hours, Disp: , Rfl:     Multiple Vitamins-Minerals (PRESERVISION/LUTEIN) CAPS, Take by mouth daily, Disp: , Rfl:     multivitamin (THERAGRAN) TABS, Take 1 tablet by mouth daily, Disp: , Rfl:     ondansetron (ZOFRAN) 8 mg tablet, Take 1 tablet (8 mg total) by mouth every 12 (twelve) hours as needed for nausea or vomiting, Disp: 30 tablet, Rfl: 1    travoprost (TRAVATAN Z) 0 004 % ophthalmic solution, 1 drop daily at bedtime, Disp: , Rfl:     triamcinolone (KENALOG) 0 1 % lotion, Apply topically as needed, Disp: , Rfl:     No Known Allergies     No history exists  ROS:  06/20/18 Reviewed 13 systems:  Presently no headaches, seizures, dizziness, diplopia, dysphagia, hoarseness, chest pain, palpitations, shortness of breath, cough, hemoptysis, abdominal pain, nausea, vomiting, change in bowel habits, melena, hematuria, fever, chills, bleeding,  skin rash, weight loss,   weakness, numbness,  claudication and gait problem  No frequent infections  Not unusually sensitive to heat or cold  No swelling of the ankles  No swollen glands  Patient is anxious   Other symptoms are in HPI        /68 (BP Location: Left arm, Cuff Size: Adult)   Pulse 80   Temp 99 °F (37 2 °C) (Tympanic)   Resp 17   Ht 5' 9 5" (1 765 m)   Wt 119 kg (262 lb 9 6 oz)   SpO2 96%   BMI 38 22 kg/m²      Physical Exam:  Alert, oriented, not in distress, no icterus, no oral thrush, no palpable neck mass, clear lung fields, regular heart rate, abdomen  soft and non tender, no palpable abdominal mass, no ascites, no edema of ankles, no calf tenderness, no focal neurological deficit, no skin rash, no palpable lymphadenopathy, good arterial pulses, no clubbing  Patient is anxious  Performance status 1      IMAGING:      LABS:  Results for orders placed or performed in visit on 06/11/18   Comprehensive metabolic panel   Result Value Ref Range    SL AMB GLUCOSE 114 (H) 65 - 99 mg/dL    BUN 21 7 - 25 mg/dL    Creatinine, Serum 0 90 0 70 - 1 11 mg/dL    eGFR Non  79 > OR = 60 mL/min/1 73m2    SL AMB EGFR  92 > OR = 60 mL/min/1 73m2    SL AMB BUN/CREATININE RATIO NOT APPLICABLE 6 - 22 (calc)    SL AMB SODIUM 137 135 - 146 mmol/L    SL AMB POTASSIUM 4 8 3 5 - 5 3 mmol/L    SL AMB CHLORIDE 103 98 - 110 mmol/L    SL AMB CARBON DIOXIDE 27 20 - 31 mmol/L    SL AMB CALCIUM 9 0 8 6 - 10 3 mg/dL    SL AMB PROTEIN, TOTAL 7 1 6 1 - 8 1 g/dL    Serum Albumin 3 9 3 6 - 5 1 g/dL    SL AMB GLOBULIN 3 2 1 9 - 3 7 g/dL (calc)    SL AMB ALBUMIN/GLOBULIN RATIO 1 2 1 0 - 2 5 (calc)    SL AMB BILIRUBIN, TOTAL 0 5 0 2 - 1 2 mg/dL    SL AMB ALKALINE PHOSPHATASE 257 (H) 40 - 115 U/L    SL AMB AST 24 10 - 35 U/L    SL AMB ALT 28 9 - 46 U/L   CBC and differential   Result Value Ref Range    SL AMB LAB WHITE BLOOD CELL COUNT 5 4 3 8 - 10 8 Thousand/uL    SL AMB LAB RED BLOOD CELLS 4 21 4 20 - 5 80 Million/uL    Hemoglobin 12 8 (L) 13 2 - 17 1 g/dL    Hematocrit 38 6 38 5 - 50 0 %    MCV 91 7 80 0 - 100 0 fL    MCH 30 4 27 0 - 33 0 pg    MCHC 33 2 32 0 - 36 0 g/dL    RDW 12 8 11 0 - 15 0 %    Platelet Count 019 243 - 400 Thousand/uL    SL AMB MPV 9 5 7 5 - 12 5 fL    Neutrophils (Absolute) 3,046 1,500 - 7,800 cells/uL    Lymphocytes (Absolute) 1,528 850 - 3,900 cells/uL    Monocytes (Absolute) 572 200 - 950 cells/uL    Eosinophils (Absolute) 221 15 - 500 cells/uL    Basophils (Absolute) 32 0 - 200 cells/uL    Neutrophils 56 4 %    Lymphocytes 28 3 %    Monocytes 10 6 %    Eosinophils 4 1 %    Basophils 0 6 %   PSA, Total Screen   Result Value Ref Range    Prostate Specific Antigen Total 22 3 (H) < OR = 4 0 ng/mL   Testosterone, free, total   Result Value Ref Range    Testosterone, Total, LC/MS      Testosterone, Free       Labs, Imaging, & Other studies:   All pertinent labs and imaging studies were personally reviewed      Reviewed and discussed with patient  Assessment and plan:  Patient is here with his wife  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  He is on Lupron Depot shot once every 6 months from his urologist   He is on Xgeva once every 3 months    He has been taking vitamin-D and calcium for Xgeva  No more problem with his teeth  Last month, May 2018 she was started on xofigo and he will have that once every 4 weeks for 6 months  Blood counts are being monitored on xofigo  Occasionally has discomfort in the upper flank areas bilaterally  Either Tylenol or Motrin takes care of the discomfort  He knows not to take Motrin empty stomach  Lupron Depot was  started in April 2017  Kirsten Lightning PSA had come down to 5 0    He was responding to hormone therapy  In 2018 disease progressed in the bones and also PSA continued to rise  He has some tiredness and minor arthritic symptoms    Physical examination and test results are as recorded and discussed  No change in therapy at present  Blood counts are being monitored frequently because of xofigo and Periodically blood chemistry and PSA  He knows there are other options after xofigo  Condition and plan discussed in detail  Questions answered  1  Prostate cancer ([facility] )      2  Bone metastases ([facility] )      3  Metastatic disease [facility])            Patient voiced understanding and agreement in the discussion  Counseling / Coordination of Care   Greater than 50% of total time was spent with the patient and / or family counseling and / or coordination of care

## 2018-06-20 NOTE — LETTER
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  He is on Lupron Depot shot once every 6 months from his urologist   He is on Xgeva once every 3 months    He has been taking vitamin-D and calcium for Xgeva  No more problem with his teeth  In  June 2018 patient was started on xofigo and he will have that once every 4 weeks for 6 months  Blood counts are being monitored on xofigo  Occasionally has discomfort in the upper flank areas bilaterally  Either Tylenol or Motrin takes care of the discomfort  He knows not to take Motrin empty stomach  Lupron Depot was  started in April 2017  Kristyn Yeung PSA had come down to 5 0    He was responding to hormone therapy  In 2018 disease progressed in the bones and also PSA continued to rise  He has some tiredness and minor arthritic symptoms            Current Outpatient Prescriptions:     aspirin (ECOTRIN LOW STRENGTH) 81 mg EC tablet, Take 81 mg by mouth daily, Disp: , Rfl:     bicalutamide (CASODEX) 50 mg tablet, Take 1 tablet (50 mg total) by mouth daily, Disp: 90 tablet, Rfl: 3    brimonidine-timolol (COMBIGAN) 0 2-0 5 %, Administer 1 drop to both eyes every 12 (twelve) hours, Disp: , Rfl:     brinzolamide (AZOPT) 1 % ophthalmic suspension, 1 drop 3 (three) times a day, Disp: , Rfl:     cholecalciferol (VITAMIN D3) 1,000 units tablet, Take 1,000 Units by mouth daily, Disp: , Rfl:     dorzolamide (TRUSOPT) 2 % ophthalmic solution, 1 drop 3 (three) times a day, Disp: , Rfl:     lisinopril (ZESTRIL) 20 mg tablet, Take 20 mg by mouth daily, Disp: , Rfl:     metoprolol tartrate (LOPRESSOR) 25 mg tablet, Take 25 mg by mouth every 12 (twelve) hours, Disp: , Rfl:     Multiple Vitamins-Minerals (PRESERVISION/LUTEIN) CAPS, Take by mouth daily, Disp: , Rfl:     multivitamin (THERAGRAN) TABS, Take 1 tablet by mouth daily, Disp: , Rfl:     ondansetron (ZOFRAN) 8 mg tablet, Take 1 tablet (8 mg total) by mouth every 12 (twelve) hours as needed for nausea or vomiting, Disp: 30 tablet, Rfl: 1    travoprost (TRAVATAN Z) 0 004 % ophthalmic solution, 1 drop daily at bedtime, Disp: , Rfl:     triamcinolone (KENALOG) 0 1 % lotion, Apply topically as needed, Disp: , Rfl:     No Known Allergies     No history exists  ROS:  06/20/18 Reviewed 13 systems:  Presently no headaches, seizures, dizziness, diplopia, dysphagia, hoarseness, chest pain, palpitations, shortness of breath, cough, hemoptysis, abdominal pain, nausea, vomiting, change in bowel habits, melena, hematuria, fever, chills, bleeding,  skin rash, weight loss,   weakness, numbness,  claudication and gait problem  No frequent infections  Not unusually sensitive to heat or cold  No swelling of the ankles  No swollen glands  Patient is anxious  Other symptoms are in HPI        /68 (BP Location: Left arm, Cuff Size: Adult)   Pulse 80   Temp 99 °F (37 2 °C) (Tympanic)   Resp 17   Ht 5' 9 5" (1 765 m)   Wt 119 kg (262 lb 9 6 oz)   SpO2 96%   BMI 38 22 kg/m²      Physical Exam:  Alert, oriented, not in distress, no icterus, no oral thrush, no palpable neck mass, clear lung fields, regular heart rate, abdomen  soft and non tender, no palpable abdominal mass, no ascites, no edema of ankles, no calf tenderness, no focal neurological deficit, no skin rash, no palpable lymphadenopathy, good arterial pulses, no clubbing  Patient is anxious  Performance status 1      IMAGING:      LABS:  Results for orders placed or performed in visit on 06/11/18   Comprehensive metabolic panel   Result Value Ref Range    SL AMB GLUCOSE 114 (H) 65 - 99 mg/dL    BUN 21 7 - 25 mg/dL    Creatinine, Serum 0 90 0 70 - 1 11 mg/dL    eGFR Non  79 > OR = 60 mL/min/1 73m2    SL AMB EGFR  92 > OR = 60 mL/min/1 73m2    SL AMB BUN/CREATININE RATIO NOT APPLICABLE 6 - 22 (calc)    SL AMB SODIUM 137 135 - 146 mmol/L    SL AMB POTASSIUM 4 8 3 5 - 5 3 mmol/L    SL AMB CHLORIDE 103 98 - 110 mmol/L    SL AMB CARBON DIOXIDE 27 20 - 31 mmol/L    SL AMB CALCIUM 9 0 8 6 - 10 3 mg/dL    SL AMB PROTEIN, TOTAL 7 1 6 1 - 8 1 g/dL    Serum Albumin 3 9 3 6 - 5 1 g/dL    SL AMB GLOBULIN 3 2 1 9 - 3 7 g/dL (calc)    SL AMB ALBUMIN/GLOBULIN RATIO 1 2 1 0 - 2 5 (calc)    SL AMB BILIRUBIN, TOTAL 0 5 0 2 - 1 2 mg/dL    SL AMB ALKALINE PHOSPHATASE 257 (H) 40 - 115 U/L    SL AMB AST 24 10 - 35 U/L    SL AMB ALT 28 9 - 46 U/L   CBC and differential   Result Value Ref Range    SL AMB LAB WHITE BLOOD CELL COUNT 5 4 3 8 - 10 8 Thousand/uL    SL AMB LAB RED BLOOD CELLS 4 21 4 20 - 5 80 Million/uL    Hemoglobin 12 8 (L) 13 2 - 17 1 g/dL    Hematocrit 38 6 38 5 - 50 0 %    MCV 91 7 80 0 - 100 0 fL    MCH 30 4 27 0 - 33 0 pg    MCHC 33 2 32 0 - 36 0 g/dL    RDW 12 8 11 0 - 15 0 %    Platelet Count 794 737 - 400 Thousand/uL    SL AMB MPV 9 5 7 5 - 12 5 fL    Neutrophils (Absolute) 3,046 1,500 - 7,800 cells/uL    Lymphocytes (Absolute) 1,528 850 - 3,900 cells/uL    Monocytes (Absolute) 572 200 - 950 cells/uL    Eosinophils (Absolute) 221 15 - 500 cells/uL    Basophils (Absolute) 32 0 - 200 cells/uL    Neutrophils 56 4 %    Lymphocytes 28 3 %    Monocytes 10 6 %    Eosinophils 4 1 %    Basophils 0 6 %   PSA, Total Screen   Result Value Ref Range    Prostate Specific Antigen Total 22 3 (H) < OR = 4 0 ng/mL   Testosterone, free, total   Result Value Ref Range Testosterone, Total, LC/MS      Testosterone, Free       Labs, Imaging, & Other studies:   All pertinent labs and imaging studies were personally reviewed      Reviewed and discussed with patient  Assessment and plan:  Patient is here with his wife  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  He is on Lupron Depot shot once every 6 months from his urologist   He is on Xgeva once every 3 months    He has been taking vitamin-D and calcium for Xgeva  No more problem with his teeth  In  June 2018 patient was started on xofigo and he will have that once every 4 weeks for 6 months  Blood counts are being monitored on xofigo  Occasionally has discomfort in the upper flank areas bilaterally  Either Tylenol or Motrin takes care of the discomfort  He knows not to take Motrin empty stomach  Lupron Depot was  started in April 2017  Alex Balfrancisco PSA had come down to 5 0    He was responding to hormone therapy  In 2018 disease progressed in the bones and also PSA continued to rise  He has some tiredness and minor arthritic symptoms    Physical examination and test results are as recorded and discussed  No change in therapy at present  Blood counts are being monitored frequently because of xofigo and Periodically blood chemistry and PSA  He knows there are other options after xofigo  Condition and plan discussed in detail  Questions answered  1  [facility] ([facility] )      2  Bone metastases ([facility] )      3  Metastatic disease [facility])            Patient voiced understanding and agreement in the discussion  Counseling / Coordination of Care   Greater than 50% of total time was spent with the patient and / or family counseling and / or coordination of care

## 2018-06-25 LAB
BASOPHILS # BLD AUTO: 28 CELLS/UL (ref 0–200)
BASOPHILS NFR BLD AUTO: 0.7 %
EOSINOPHIL # BLD AUTO: 248 CELLS/UL (ref 15–500)
EOSINOPHIL NFR BLD AUTO: 6.2 %
ERYTHROCYTE [DISTWIDTH] IN BLOOD BY AUTOMATED COUNT: 13.1 % (ref 11–15)
HCT VFR BLD AUTO: 34.6 % (ref 38.5–50)
HGB BLD-MCNC: 11.7 G/DL (ref 13.2–17.1)
LYMPHOCYTES # BLD AUTO: 1068 CELLS/UL (ref 850–3900)
LYMPHOCYTES NFR BLD AUTO: 26.7 %
MCH RBC QN AUTO: 30.9 PG (ref 27–33)
MCHC RBC AUTO-ENTMCNC: 33.8 G/DL (ref 32–36)
MCV RBC AUTO: 91.3 FL (ref 80–100)
MONOCYTES # BLD AUTO: 356 CELLS/UL (ref 200–950)
MONOCYTES NFR BLD AUTO: 8.9 %
NEUTROPHILS # BLD AUTO: 2300 CELLS/UL (ref 1500–7800)
NEUTROPHILS NFR BLD AUTO: 57.5 %
PLATELET # BLD AUTO: 239 THOUSAND/UL (ref 140–400)
PMV BLD REES-ECKER: 9.6 FL (ref 7.5–12.5)
RBC # BLD AUTO: 3.79 MILLION/UL (ref 4.2–5.8)
WBC # BLD AUTO: 4 THOUSAND/UL (ref 3.8–10.8)

## 2018-06-26 ENCOUNTER — OFFICE VISIT (OUTPATIENT)
Dept: FAMILY MEDICINE CLINIC | Facility: CLINIC | Age: 83
End: 2018-06-26
Payer: MEDICARE

## 2018-06-26 VITALS
SYSTOLIC BLOOD PRESSURE: 130 MMHG | HEIGHT: 69 IN | DIASTOLIC BLOOD PRESSURE: 84 MMHG | BODY MASS INDEX: 38.8 KG/M2 | WEIGHT: 262 LBS

## 2018-06-26 DIAGNOSIS — H26.9 CATARACT OF LEFT EYE, UNSPECIFIED CATARACT TYPE: ICD-10-CM

## 2018-06-26 DIAGNOSIS — E66.09 CLASS 2 OBESITY DUE TO EXCESS CALORIES WITHOUT SERIOUS COMORBIDITY WITH BODY MASS INDEX (BMI) OF 38.0 TO 38.9 IN ADULT: ICD-10-CM

## 2018-06-26 DIAGNOSIS — E78.5 HYPERLIPIDEMIA, ACQUIRED: ICD-10-CM

## 2018-06-26 DIAGNOSIS — Z01.818 PREOPERATIVE CLEARANCE: Primary | ICD-10-CM

## 2018-06-26 DIAGNOSIS — C61 PROSTATE CANCER (HCC): ICD-10-CM

## 2018-06-26 DIAGNOSIS — N18.2 CKD (CHRONIC KIDNEY DISEASE) STAGE 2, GFR 60-89 ML/MIN: ICD-10-CM

## 2018-06-26 PROCEDURE — 99214 OFFICE O/P EST MOD 30 MIN: CPT | Performed by: NURSE PRACTITIONER

## 2018-06-26 NOTE — PROGRESS NOTES
Subjective: Sherlyn Ceballos is a 81 y o  male who presents to the office today for a preoperative consultation at the request of surgeon Dr Caesar Bennett who plans on performing Left cataract surgery on July 24  Planned anesthesia is MAC with block  The patient has the following known anesthesia issues: none  Patient has a bleeding risk of: no recent abnormal bleeding  Review of Systems  Review of Systems   Constitutional: Negative for chills and fever  HENT: Negative for congestion  Eyes: Negative for discharge and visual disturbance  Respiratory: Negative for chest tightness and shortness of breath  Cardiovascular: Negative for chest pain, palpitations and leg swelling  Gastrointestinal: Negative for abdominal pain, diarrhea, nausea and vomiting  Genitourinary: Negative for difficulty urinating and dysuria  Musculoskeletal: Negative for myalgias  Skin: Negative for rash  Neurological: Negative for dizziness, syncope, light-headedness, numbness and headaches  Psychiatric/Behavioral: Negative for agitation  Objective:      Physical Exam    /84 (BP Location: Left arm, Patient Position: Sitting, Cuff Size: Standard)   Ht 5' 9" (1 753 m)   Wt 119 kg (262 lb)   BMI 38 69 kg/m²     Physical Exam   Constitutional: He is oriented to person, place, and time  He appears well-developed  No distress  obese   HENT:   Head: Normocephalic and atraumatic  Right Ear: External ear normal    Left Ear: External ear normal    Nose: Nose normal    Mouth/Throat: Uvula is midline, oropharynx is clear and moist and mucous membranes are normal  No oropharyngeal exudate, posterior oropharyngeal edema or posterior oropharyngeal erythema  Wears partial upper dentures   Eyes: Conjunctivae and lids are normal  Right eye exhibits no discharge  Left eye exhibits no discharge  Wears glasses   Neck: Normal range of motion  Neck supple  No tracheal deviation present     Cardiovascular: Normal rate and regular rhythm  No murmur heard  Pulmonary/Chest: Effort normal and breath sounds normal  No respiratory distress  He has no wheezes  Abdominal: Soft  Bowel sounds are normal  He exhibits no distension  There is no tenderness  There is no guarding  Musculoskeletal: Normal range of motion  He exhibits no edema or deformity  Lymphadenopathy:     He has no cervical adenopathy  Neurological: He is alert and oriented to person, place, and time  Skin: Skin is warm and dry  No rash noted  He is not diaphoretic  No erythema  Psychiatric: He has a normal mood and affect  His speech is normal and behavior is normal  Judgment and thought content normal  Cognition and memory are normal    Nursing note and vitals reviewed  Predictors of intubation difficulty:  Morbid obesity? no  Anatomically abnormal facies? no  Short, thick neck? no  Neck range of motion: normal    Cardiographics  None required per surgeon  Imaging  None required per surgeon  Lab Review   None required per surgeon  Assessment:      81 y o  male with planned surgery as above  Known risk factors for perioperative complications: Renal dysfunction        Can walk 2 blocks without difficulty: Yes  Can walk up 2 flights of stairs without difficulty:  Yes      Plan:         Preoperative Clearance Examination    Prostate cancer ([facility] )  Being treated once monthly with nuclear injection  Currently has bone mets  CKD (chronic kidney disease) stage 2, GFR 60-89 ml/min  Stable  Creatinine WNL and recent GFR 79  Benign hypertensive CKD, stage 1-4 or unspecified chronic kidney disease  Stable and pressure at goal on Metoprolol  Hyperlipidemia, acquired  Not at goal  Patient not on medication  He is to continue with diet and exercise  Will be repeated  Obesity  He is to modify diet and exercise  Other abnormal glucose  A1C to be checked  Patient asymptomatic  Last A1C was 5 6 in 2017       Cataract of left eye  Surgery scheduled 7/24/18 with Dr Fouzia Handley at [facility]  Patient is medically cleared for surgery  Patient has no known allergies  Past Medical History:   Diagnosis Date    Acute bronchitis     Hypertension     [facility]     Urinary frequency     UTI (urinary tract infection)      Past Surgical History:   Procedure Laterality Date    CHOLECYSTECTOMY      COLONOSCOPY      [id number] ([place])     Patient Active Problem List   Diagnosis    [facility] )    Acquired hypertriglyceridemia    Benign hypertensive CKD, stage 1-4 or unspecified chronic kidney disease    Bone metastases (HCC)    CKD (chronic kidney disease) stage 2, GFR 60-89 ml/min    Decreased hearing, bilateral    Glaucoma    Hyperlipidemia, acquired    Macular degeneration    [facility] disease [facility] )    Obesity    Other abnormal glucose    Cataract of left eye     Social History     Social History    Marital status: /Civil Union     Spouse name: N/A    Number of children: N/A    Years of education: N/A     Social History Main Topics    Smoking status: Former Smoker     Quit date: 1977    Smokeless tobacco: Never Used    Alcohol use Yes      Comment: 1/day   Per [employer]: Social    Drug use: No    Sexual activity: Not Asked     Other Topics Concern    None     Social History Narrative    None       Current Outpatient Prescriptions:     aspirin (ECOTRIN LOW STRENGTH) 81 mg EC tablet, Take 81 mg by mouth daily, Disp: , Rfl:     bicalutamide (CASODEX) 50 mg tablet, Take 1 tablet (50 mg total) by mouth daily, Disp: 90 tablet, Rfl: 3    brimonidine-timolol (COMBIGAN) 0 2-0 5 %, Administer 1 drop to both eyes every 12 (twelve) hours, Disp: , Rfl:     cholecalciferol (VITAMIN D3) 1,000 units tablet, Take 1,000 Units by mouth daily, Disp: , Rfl:     dorzolamide (TRUSOPT) 2 % ophthalmic solution, 1 drop 3 (three) times a day, Disp: , Rfl:     lisinopril (ZESTRIL) 20 mg tablet, Take 20 mg by mouth daily, Disp: , Rfl:     metoprolol tartrate (LOPRESSOR) 25 mg tablet, Take 25 mg by mouth every 12 (twelve) hours, Disp: , Rfl:     Multiple Vitamins-Minerals (PRESERVISION/LUTEIN) CAPS, Take by mouth daily, Disp: , Rfl:     multivitamin (THERAGRAN) TABS, Take 1 tablet by mouth daily, Disp: , Rfl:     ondansetron (ZOFRAN) 8 mg tablet, Take 1 tablet (8 mg total) by mouth every 12 (twelve) hours as needed for nausea or vomiting, Disp: 30 tablet, Rfl: 1    travoprost (TRAVATAN Z) 0 004 % ophthalmic solution, 1 drop daily at bedtime, Disp: , Rfl:

## 2018-07-10 LAB
BASOPHILS # BLD AUTO: 32 CELLS/UL (ref 0–200)
BASOPHILS NFR BLD AUTO: 0.8 %
EOSINOPHIL # BLD AUTO: 180 CELLS/UL (ref 15–500)
EOSINOPHIL NFR BLD AUTO: 4.5 %
ERYTHROCYTE [DISTWIDTH] IN BLOOD BY AUTOMATED COUNT: 13.2 % (ref 11–15)
HCT VFR BLD AUTO: 36.4 % (ref 38.5–50)
HGB BLD-MCNC: 12.2 G/DL (ref 13.2–17.1)
LYMPHOCYTES # BLD AUTO: 1124 CELLS/UL (ref 850–3900)
LYMPHOCYTES NFR BLD AUTO: 28.1 %
MCH RBC QN AUTO: 30.5 PG (ref 27–33)
MCHC RBC AUTO-ENTMCNC: 33.5 G/DL (ref 32–36)
MCV RBC AUTO: 91 FL (ref 80–100)
MONOCYTES # BLD AUTO: 464 CELLS/UL (ref 200–950)
MONOCYTES NFR BLD AUTO: 11.6 %
NEUTROPHILS # BLD AUTO: 2200 CELLS/UL (ref 1500–7800)
NEUTROPHILS NFR BLD AUTO: 55 %
PLATELET # BLD AUTO: 187 THOUSAND/UL (ref 140–400)
PMV BLD REES-ECKER: 9.9 FL (ref 7.5–12.5)
RBC # BLD AUTO: 4 MILLION/UL (ref 4.2–5.8)
WBC # BLD AUTO: 4 THOUSAND/UL (ref 3.8–10.8)

## 2018-07-11 ENCOUNTER — HOSPITAL ENCOUNTER (OUTPATIENT)
Dept: RADIOLOGY | Age: 83
Discharge: HOME | End: 2018-07-11
Payer: MEDICARE

## 2018-07-11 DIAGNOSIS — C79.51 SECONDARY MALIGNANT NEOPLASM OF BONE AND BONE MARROW (HCC): ICD-10-CM

## 2018-07-11 DIAGNOSIS — C79.52 SECONDARY MALIGNANT NEOPLASM OF BONE AND BONE MARROW (HCC): ICD-10-CM

## 2018-07-11 PROCEDURE — 79101 NUCLEAR RX IV ADMIN: CPT

## 2018-07-11 PROCEDURE — A9606 RADIUM RA223 DICHLORIDE THER: HCPCS

## 2018-07-19 ENCOUNTER — HOSPITAL ENCOUNTER (OUTPATIENT)
Dept: INFUSION CENTER | Facility: CLINIC | Age: 83
Discharge: HOME | End: 2018-07-19
Payer: MEDICARE

## 2018-07-19 VITALS
SYSTOLIC BLOOD PRESSURE: 136 MMHG | DIASTOLIC BLOOD PRESSURE: 82 MMHG | HEART RATE: 68 BPM | TEMPERATURE: 97.8 F | RESPIRATION RATE: 16 BRPM

## 2018-07-19 PROCEDURE — 96401 CHEMO ANTI-NEOPL SQ/IM: CPT

## 2018-07-19 RX ADMIN — DENOSUMAB 120 MG: 120 INJECTION SUBCUTANEOUS at 10:30

## 2018-07-19 NOTE — PLAN OF CARE
Problem: PAIN - ADULT  Goal: Verbalizes/displays adequate comfort level or baseline comfort level  Interventions:  - Encourage patient to monitor pain and request assistance  - Assess pain using appropriate pain scale  - Administer analgesics based on type and severity of pain and evaluate response  - Implement non-pharmacological measures as appropriate and evaluate response  - Consider cultural and social influences on pain and pain management  - Notify physician/advanced practitioner if interventions unsuccessful or patient reports new pain  Outcome: Progressing      Problem: INFECTION - ADULT  Goal: Absence or prevention of progression during hospitalization  INTERVENTIONS:  - Assess and monitor for signs and symptoms of infection  - Monitor lab/diagnostic results  - Monitor all insertion sites, i e  indwelling lines, tubes, and drains  - Monitor endotracheal (as able) and nasal secretions for changes in amount and color  - [place]iddle appropriate cooling/warming therapies per order  - Administer medications as ordered  - Instruct and encourage patient and family to use good hand hygiene technique  - Identify and instruct in appropriate isolation precautions for identified infection/condition  Outcome: Progressing    Goal: Absence of fever/infection during neutropenic period  INTERVENTIONS:  - Monitor WBC  - Implement neutropenic guidelines  Outcome: Progressing      Problem: Knowledge Deficit  Goal: Patient/family/caregiver demonstrates understanding of disease process, treatment plan, medications, and discharge instructions  Complete learning assessment and assess knowledge base    Interventions:  - Provide teaching at level of understanding  - Provide teaching via preferred learning methods  Outcome: Progressing

## 2018-07-19 NOTE — PROGRESS NOTES
Xgeva given SQ in [place] as ordered  Future appointment scheduled for 3 mos  As ordered  AVS provided with written appointment

## 2018-07-19 NOTE — PROGRESS NOTES
Pt  Denies new symptoms or concerns at this time  Pt  Continues to get Xofigo infusions; states he is tolerating well other than fatigue the first week  Ca+ 9 0  Xgeva ordered today

## 2018-07-24 LAB
BASOPHILS # BLD AUTO: 31 CELLS/UL (ref 0–200)
BASOPHILS NFR BLD AUTO: 0.6 %
EOSINOPHIL # BLD AUTO: 138 CELLS/UL (ref 15–500)
EOSINOPHIL NFR BLD AUTO: 2.7 %
ERYTHROCYTE [DISTWIDTH] IN BLOOD BY AUTOMATED COUNT: 13.5 % (ref 11–15)
HCT VFR BLD AUTO: 35.5 % (ref 38.5–50)
HGB BLD-MCNC: 12.2 G/DL (ref 13.2–17.1)
LYMPHOCYTES # BLD AUTO: 1168 CELLS/UL (ref 850–3900)
LYMPHOCYTES NFR BLD AUTO: 22.9 %
MCH RBC QN AUTO: 31.4 PG (ref 27–33)
MCHC RBC AUTO-ENTMCNC: 34.4 G/DL (ref 32–36)
MCV RBC AUTO: 91.3 FL (ref 80–100)
MONOCYTES # BLD AUTO: 617 CELLS/UL (ref 200–950)
MONOCYTES NFR BLD AUTO: 12.1 %
NEUTROPHILS # BLD AUTO: 3147 CELLS/UL (ref 1500–7800)
NEUTROPHILS NFR BLD AUTO: 61.7 %
PLATELET # BLD AUTO: 207 THOUSAND/UL (ref 140–400)
PMV BLD REES-ECKER: 9.8 FL (ref 7.5–12.5)
RBC # BLD AUTO: 3.89 MILLION/UL (ref 4.2–5.8)
WBC # BLD AUTO: 5.1 THOUSAND/UL (ref 3.8–10.8)

## 2018-08-07 LAB
BASOPHILS # BLD AUTO: 42 CELLS/UL (ref 0–200)
BASOPHILS NFR BLD AUTO: 1 %
EOSINOPHIL # BLD AUTO: 151 CELLS/UL (ref 15–500)
EOSINOPHIL NFR BLD AUTO: 3.6 %
ERYTHROCYTE [DISTWIDTH] IN BLOOD BY AUTOMATED COUNT: 13.6 % (ref 11–15)
HCT VFR BLD AUTO: 35.8 % (ref 38.5–50)
HGB BLD-MCNC: 12 G/DL (ref 13.2–17.1)
LYMPHOCYTES # BLD AUTO: 1441 CELLS/UL (ref 850–3900)
LYMPHOCYTES NFR BLD AUTO: 34.3 %
MCH RBC QN AUTO: 30.4 PG (ref 27–33)
MCHC RBC AUTO-ENTMCNC: 33.5 G/DL (ref 32–36)
MCV RBC AUTO: 90.6 FL (ref 80–100)
MONOCYTES # BLD AUTO: 428 CELLS/UL (ref 200–950)
MONOCYTES NFR BLD AUTO: 10.2 %
NEUTROPHILS # BLD AUTO: 2138 CELLS/UL (ref 1500–7800)
NEUTROPHILS NFR BLD AUTO: 50.9 %
PLATELET # BLD AUTO: 167 THOUSAND/UL (ref 140–400)
PMV BLD REES-ECKER: 9.3 FL (ref 7.5–12.5)
RBC # BLD AUTO: 3.95 MILLION/UL (ref 4.2–5.8)
WBC # BLD AUTO: 4.2 THOUSAND/UL (ref 3.8–10.8)

## 2018-08-09 ENCOUNTER — HOSPITAL ENCOUNTER (OUTPATIENT)
Dept: RADIOLOGY | Age: 83
Discharge: HOME | End: 2018-08-09
Payer: MEDICARE

## 2018-08-09 DIAGNOSIS — C79.51 SECONDARY MALIGNANT NEOPLASM OF BONE AND BONE MARROW (HCC): ICD-10-CM

## 2018-08-09 DIAGNOSIS — C79.52 SECONDARY MALIGNANT NEOPLASM OF BONE AND BONE MARROW (HCC): ICD-10-CM

## 2018-08-09 PROCEDURE — A9606 RADIUM RA223 DICHLORIDE THER: HCPCS

## 2018-08-09 PROCEDURE — 79101 NUCLEAR RX IV ADMIN: CPT

## 2018-08-21 LAB
BASOPHILS # BLD AUTO: 29 CELLS/UL (ref 0–200)
BASOPHILS NFR BLD AUTO: 0.8 %
EOSINOPHIL # BLD AUTO: 162 CELLS/UL (ref 15–500)
EOSINOPHIL NFR BLD AUTO: 4.5 %
ERYTHROCYTE [DISTWIDTH] IN BLOOD BY AUTOMATED COUNT: 14.1 % (ref 11–15)
HCT VFR BLD AUTO: 33.5 % (ref 38.5–50)
HGB BLD-MCNC: 11.5 G/DL (ref 13.2–17.1)
LYMPHOCYTES # BLD AUTO: 796 CELLS/UL (ref 850–3900)
LYMPHOCYTES NFR BLD AUTO: 22.1 %
MCH RBC QN AUTO: 31.3 PG (ref 27–33)
MCHC RBC AUTO-ENTMCNC: 34.3 G/DL (ref 32–36)
MCV RBC AUTO: 91.3 FL (ref 80–100)
MONOCYTES # BLD AUTO: 403 CELLS/UL (ref 200–950)
MONOCYTES NFR BLD AUTO: 11.2 %
NEUTROPHILS # BLD AUTO: 2210 CELLS/UL (ref 1500–7800)
NEUTROPHILS NFR BLD AUTO: 61.4 %
PLATELET # BLD AUTO: 216 THOUSAND/UL (ref 140–400)
PMV BLD REES-ECKER: 9.6 FL (ref 7.5–12.5)
RBC # BLD AUTO: 3.67 MILLION/UL (ref 4.2–5.8)
WBC # BLD AUTO: 3.6 THOUSAND/UL (ref 3.8–10.8)

## 2018-09-05 LAB
BASOPHILS # BLD AUTO: 41 CELLS/UL (ref 0–200)
BASOPHILS NFR BLD AUTO: 0.9 %
EOSINOPHIL # BLD AUTO: 131 CELLS/UL (ref 15–500)
EOSINOPHIL NFR BLD AUTO: 2.9 %
ERYTHROCYTE [DISTWIDTH] IN BLOOD BY AUTOMATED COUNT: 14 % (ref 11–15)
HCT VFR BLD AUTO: 33.4 % (ref 38.5–50)
HGB BLD-MCNC: 11.4 G/DL (ref 13.2–17.1)
LYMPHOCYTES # BLD AUTO: 1071 CELLS/UL (ref 850–3900)
LYMPHOCYTES NFR BLD AUTO: 23.8 %
MCH RBC QN AUTO: 31.1 PG (ref 27–33)
MCHC RBC AUTO-ENTMCNC: 34.1 G/DL (ref 32–36)
MCV RBC AUTO: 91 FL (ref 80–100)
MONOCYTES # BLD AUTO: 527 CELLS/UL (ref 200–950)
MONOCYTES NFR BLD AUTO: 11.7 %
NEUTROPHILS # BLD AUTO: 2732 CELLS/UL (ref 1500–7800)
NEUTROPHILS NFR BLD AUTO: 60.7 %
PLATELET # BLD AUTO: 190 THOUSAND/UL (ref 140–400)
PMV BLD REES-ECKER: 9.9 FL (ref 7.5–12.5)
RBC # BLD AUTO: 3.67 MILLION/UL (ref 4.2–5.8)
SERVICE CMNT-IMP: ABNORMAL
WBC # BLD AUTO: 4.5 THOUSAND/UL (ref 3.8–10.8)

## 2018-09-06 ENCOUNTER — HOSPITAL ENCOUNTER (OUTPATIENT)
Dept: RADIOLOGY | Age: 83
Discharge: HOME | End: 2018-09-06
Payer: MEDICARE

## 2018-09-06 DIAGNOSIS — C79.52 SECONDARY MALIGNANT NEOPLASM OF BONE AND BONE MARROW (HCC): ICD-10-CM

## 2018-09-06 DIAGNOSIS — C79.51 SECONDARY MALIGNANT NEOPLASM OF BONE AND BONE MARROW (HCC): ICD-10-CM

## 2018-09-06 PROCEDURE — 79101 NUCLEAR RX IV ADMIN: CPT

## 2018-09-06 PROCEDURE — A9606 RADIUM RA223 DICHLORIDE THER: HCPCS

## 2018-09-18 LAB
BASOPHILS # BLD AUTO: 29 CELLS/UL (ref 0–200)
BASOPHILS NFR BLD AUTO: 1 %
EOSINOPHIL # BLD AUTO: 238 CELLS/UL (ref 15–500)
EOSINOPHIL NFR BLD AUTO: 8.2 %
ERYTHROCYTE [DISTWIDTH] IN BLOOD BY AUTOMATED COUNT: 14.3 % (ref 11–15)
HCT VFR BLD AUTO: 31.2 % (ref 38.5–50)
HGB BLD-MCNC: 10.5 G/DL (ref 13.2–17.1)
LYMPHOCYTES # BLD MANUAL: 658 CELLS/UL (ref 850–3900)
LYMPHOCYTES NFR BLD AUTO: 22.7 %
MCH RBC QN AUTO: 30.6 PG (ref 27–33)
MCHC RBC AUTO-ENTMCNC: 33.7 G/DL (ref 32–36)
MCV RBC AUTO: 91 FL (ref 80–100)
MONOCYTES # BLD AUTO: 180 CELLS/UL (ref 200–950)
MONOCYTES NFR BLD AUTO: 6.2 %
NEUTROPHILS # BLD AUTO: 1734 CELLS/UL (ref 1500–7800)
NEUTROPHILS NFR BLD AUTO: 59.8 %
NEUTS BAND # BLD: 61 CELLS/UL (ref 0–750)
NEUTS BAND NFR BLD MANUAL: 2.1 %
NRBC # BLD: 29 CELLS/UL
NRBC BLD-RTO: 1 /100 WBC
PLATELET # BLD AUTO: 180 THOUSAND/UL (ref 140–400)
PMV BLD REES-ECKER: 9 FL (ref 7.5–12.5)
RBC # BLD AUTO: 3.43 MILLION/UL (ref 4.2–5.8)
WBC # BLD AUTO: 2.9 THOUSAND/UL (ref 3.8–10.8)

## 2018-09-24 ENCOUNTER — TELEPHONE (OUTPATIENT)
Dept: UROLOGY | Facility: CLINIC | Age: 83
End: 2018-09-24

## 2018-09-24 DIAGNOSIS — C61 PROSTATE CANCER (HCC): Primary | ICD-10-CM

## 2018-09-25 NOTE — TELEPHONE ENCOUNTER
Spoke with PT and scheduled for 10/26/18 with Sultana at 2pm at [address] as this is closer to PT's home  I mailed PSA and appt card to confirmed address

## 2018-09-26 ENCOUNTER — OFFICE VISIT (OUTPATIENT)
Dept: HEMATOLOGY ONCOLOGY | Facility: CLINIC | Age: 83
End: 2018-09-26
Payer: MEDICARE

## 2018-09-26 VITALS
HEIGHT: 70 IN | SYSTOLIC BLOOD PRESSURE: 122 MMHG | HEART RATE: 71 BPM | DIASTOLIC BLOOD PRESSURE: 70 MMHG | TEMPERATURE: 99.1 F | OXYGEN SATURATION: 97 % | WEIGHT: 256.2 LBS | BODY MASS INDEX: 36.68 KG/M2 | RESPIRATION RATE: 20 BRPM

## 2018-09-26 DIAGNOSIS — C79.51 BONE METASTASES (HCC): ICD-10-CM

## 2018-09-26 DIAGNOSIS — C61 PROSTATE CANCER (HCC): Primary | ICD-10-CM

## 2018-09-26 PROCEDURE — 99214 OFFICE O/P EST MOD 30 MIN: CPT | Performed by: INTERNAL MEDICINE

## 2018-09-26 NOTE — PROGRESS NOTES
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  Blood counts are being monitored on xofigo  Prior to starting Xofigo in June 2018 PSA was 22 3 while PSA was 5 in April 2018  Patient has some discomfort in his upper flank areas bilaterally as before and it comes and goes  He does not have sense of taste    Once in awhile diarrhea    He has some tiredness and minor arthritic symptoms      Current Outpatient Prescriptions:     bicalutamide (CASODEX) 50 mg tablet, Take 1 tablet (50 mg total) by mouth daily, Disp: 90 tablet, Rfl: 3    brimonidine-timolol (COMBIGAN) 0 2-0 5 %, Administer 1 drop to both eyes every 12 (twelve) hours, Disp: , Rfl:     cholecalciferol (VITAMIN D3) 1,000 units tablet, Take 1,000 Units by mouth daily, Disp: , Rfl:     dorzolamide (TRUSOPT) 2 % ophthalmic solution, 1 drop 3 (three) times a day, Disp: , Rfl:     lisinopril (ZESTRIL) 20 mg tablet, Take 20 mg by mouth daily, Disp: , Rfl:     metoprolol tartrate (LOPRESSOR) 25 mg tablet, Take 25 mg by mouth every 12 (twelve) hours, Disp: , Rfl:     Multiple Vitamins-Minerals (PRESERVISION/LUTEIN) CAPS, Take by mouth daily, Disp: , Rfl:     ondansetron (ZOFRAN) 8 mg tablet, Take 1 tablet (8 mg total) by mouth every 12 (twelve) hours as needed for nausea or vomiting, Disp: 30 tablet, Rfl: 1    travoprost (TRAVATAN Z) 0 004 % ophthalmic solution, 1 drop daily at bedtime, Disp: , Rfl:     aspirin (ECOTRIN LOW STRENGTH) 81 mg EC tablet, Take 81 mg by mouth daily, Disp: , Rfl:     multivitamin (THERAGRAN) TABS, Take 1 tablet by mouth daily, Disp: , Rfl:     No Known Allergies    Oncology History     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  He is on Lupron Depot shot once every 6 months from his urologist   He is on Xgeva once every 3 months    He has been taking vitamin-D and calcium for Xgeva  No more problem with his teeth  Lupron depot was started in April 2017  Disease progressed in 2018  In  June 2018 patient was started on xofigo         Prostate cancer ([address] )    1/25/2018 Initial Diagnosis     Prostate cancer [facility])        Chemotherapy      April 2017- Lupron depot   2018Emanuel Davidson          Radiation      June 2018- xofigo            ROS:  09/26/18 Reviewed 13 systems:  Presently no headaches, seizures, dizziness, diplopia, dysphagia, hoarseness, chest pain, palpitations, shortness of breath, cough, hemoptysis, abdominal pain, nausea, vomiting,  melena, hematuria, fever, chills, bleeding, skin rash, weight loss,  weakness, numbness,  claudication and gait problem  No frequent infections  Not unusually sensitive to heat or cold  No swelling of the ankles  No swollen glands  Patient is anxious  Other symptoms are in HPI        /70 (BP Location: Right arm, Patient Position: Sitting, Cuff Size: Adult)   Pulse 71   Temp 99 1 °F (37 3 °C)   Resp 20   Ht 5' 9 5" (1 765 m)   Wt 116 kg (256 lb 3 2 oz)   SpO2 97%   BMI 37 29 kg/m²     Physical Exam:  Alert, oriented, not in distress, no icterus, no oral thrush, no palpable neck mass, clear lung fields, regular heart rate, systolic murmur, abdomen  soft and non tender, no palpable abdominal mass, no ascites, umbilical hernia, no edema of ankles, no calf tenderness, no focal neurological deficit, no skin rash, no palpable lymphadenopathy, good arterial pulses, no clubbing  Patient is anxious  Performance status 1      IMAGING:  No orders to display       LABS:  Results for orders placed or performed in visit on 09/17/18   CBC and differential   Result Value Ref Range    White Blood Cell Count 2 9 (L) 3 8 - 10 8 Thousand/uL    Red Blood Cell Count 3 43 (L) 4 20 - 5 80 Million/uL    Hemoglobin 10 5 (L) 13 2 - 17 1 g/dL    HCT 31 2 (L) 38 5 - 50 0 %    MCV 91 0 80 0 - 100 0 fL    MCH 30 6 27 0 - 33 0 pg    MCHC 33 7 32 0 - 36 0 g/dL    RDW 14 3 11 0 - 15 0 %    Platelet Count 528 923 - 400 Thousand/uL    SL AMB MPV 9 0 7 5 - 12 5 fL   Differential   Result Value Ref Range    Neutrophils (Absolute) 1,734 1,500 - 7,800 cells/uL    SL AMB ABSOLUTE BAND NEUTROPHILS 61 0 - 750 cells/uL    SL AMB LYMPHOCYTES 658 (L) 850 - 3,900 cells/uL    Monocytes (Absolute) 180 (L) 200 - 950 cells/uL    Eosinophils (Absolute) 238 15 - 500 cells/uL    Basophils (Absolute) 29 0 - 200 cells/uL    SL AMB ABSOLUTE NUCLEATED RBC 29 (H) 0 cells/uL    Neutrophils 59 8 %    SL AMB BAND NEUTROPHILS 2 1 %    Lymphocytes 22 7 %    Monocytes 6 2 %    Eosinophils 8 2 %    Basophils Relative 1 0 %    SL AMB NUCLEATED RBC 1 (H) 0 /100 WBC    Note:       Labs, Imaging, & Other studies:   All pertinent labs and imaging studies were personally reviewed    Lab Results   Component Value Date     12/27/2017    K 4 8 12/27/2017     06/11/2018    CO2 27 06/11/2018    BUN 21 06/11/2018    CREATININE 0 90 06/11/2018    CALCIUM 9 0 06/11/2018    AST 23 12/27/2017    ALT 22 12/27/2017    ALKPHOS 257 (H) 06/11/2018    PROT 7 0 12/27/2017    BILITOT 0 5 12/27/2017     Lab Results   Component Value Date    WBC 2 9 (L) 09/17/2018    HGB 10 5 (L) 09/17/2018    HCT 31 2 (L) 09/17/2018    MCV 91 0 09/17/2018     09/17/2018       Reviewed and discussed with patient  Assessment and plan:  Patient is here with his wife  Patient is on Casodex, Lupron depot, Illene Come and also on xofigo as of June 2018 and he has received 4 of 6 Xofigo treatments  Lupron depot was started in 2017 that worked for several months but then disease progressed in the bones  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  Blood counts are being monitored on xofigo  Prior to starting Xofigo in June 2018 PSA was 22 3 while PSA was 5 in April 2018  Patient has some discomfort in his upper flank areas bilaterally as before and it comes and goes  He does not have sense of taste  Once in awhile diarrhea   Susan Mccormick has some tiredness and minor arthritic symptoms    Physical examination and test results are as recorded and discussed  He gets the Lupron depot and Casodex from his urologist   No change in therapy at present  PSA to be recheck after completion of xofigo  Condition discussed and explained  Questions answered  Patient has follow-up appointment in the office  Discussed the importance of eating healthy foods, staying active and health screening test   He has been taking calcium and vitamin-D and is staying active  1  Prostate cancer ([facility] )    - Comprehensive metabolic panel; Future    2  Bone metastases (HCC)    - Comprehensive metabolic panel; Future          Patient voiced understanding and agreement in the discussion  Counseling / Coordination of Care   Greater than 50% of total time was spent with the patient and / or family counseling and / or coordination of care

## 2018-09-26 NOTE — LETTER
September 26, 7716     Vern Mercado DO  [address] [id number] [address]    Patient: Michael Moore   YOB: 1935   Date of Visit: 9/26/2018       Dear Dr Niall Robertson: Thank you for referring Padmini Mayfield to me for evaluation  Below are my notes for this consultation  If you have questions, please do not hesitate to call me  I look forward to following your patient along with you  Sincerely,        Ashleigh Elena MD        CC: No Recipients  Ashleigh Elena MD  9/26/2018 11:41 PM  Sign at close encounter    HPI:   Patient is here with his wife  Patient is on Casodex, Lupron depot, Jah Nicely and also on xofigo as of June 2018 and he has received 4 of 6 Xofigo treatments  Lupron depot was started in 2017 that worked for several months but then disease progressed in the bones  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  Blood counts are being monitored on xofigo  Prior to starting Xofigo in June 2018 PSA was 22 3 while PSA was 5 in April 2018  Patient has some discomfort in his upper flank areas bilaterally as before and it comes and goes  He does not have sense of taste    Once in awhile diarrhea    He has some tiredness and minor arthritic symptoms      Current Outpatient Prescriptions:     bicalutamide (CASODEX) 50 mg tablet, Take 1 tablet (50 mg total) by mouth daily, Disp: 90 tablet, Rfl: 3    brimonidine-timolol (COMBIGAN) 0 2-0 5 %, Administer 1 drop to both eyes every 12 (twelve) hours, Disp: , Rfl:     cholecalciferol (VITAMIN D3) 1,000 units tablet, Take 1,000 Units by mouth daily, Disp: , Rfl:     dorzolamide (TRUSOPT) 2 % ophthalmic solution, 1 drop 3 (three) times a day, Disp: , Rfl:     lisinopril (ZESTRIL) 20 mg tablet, Take 20 mg by mouth daily, Disp: , Rfl:     metoprolol tartrate (LOPRESSOR) 25 mg tablet, Take 25 mg by mouth every 12 (twelve) hours, Disp: , Rfl:     Multiple Vitamins-Minerals (PRESERVISION/LUTEIN) CAPS, Take by mouth daily, Disp: , Rfl:     ondansetron (ZOFRAN) 8 mg tablet, Take 1 tablet (8 mg total) by mouth every 12 (twelve) hours as needed for nausea or vomiting, Disp: 30 tablet, Rfl: 1    travoprost (TRAVATAN Z) 0 004 % ophthalmic solution, 1 drop daily at bedtime, Disp: , Rfl:     aspirin (ECOTRIN LOW STRENGTH) 81 mg EC tablet, Take 81 mg by mouth daily, Disp: , Rfl:     multivitamin (THERAGRAN) TABS, Take 1 tablet by mouth daily, Disp: , Rfl:     No Known Allergies    Oncology History     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  He is on Lupron Depot shot once every 6 months from his urologist   He is on Xgeva once every 3 months    He has been taking vitamin-D and calcium for Xgeva  No more problem with his teeth  Lupron depot was started in April 2017  Disease progressed in 2018  In  June 2018 patient was started on xofigo         Prostate cancer ([address] )    1/25/2018 Initial Diagnosis     Prostate cancer [facility])        Chemotherapy      April 2017- Lupron depot   2018Hester Johann          Radiation      June 2018- xofigo            ROS:  09/26/18 Reviewed 13 systems:  Presently no headaches, seizures, dizziness, diplopia, dysphagia, hoarseness, chest pain, palpitations, shortness of breath, cough, hemoptysis, abdominal pain, nausea, vomiting,  melena, hematuria, fever, chills, bleeding, skin rash, weight loss,  weakness, numbness,  claudication and gait problem  No frequent infections  Not unusually sensitive to heat or cold  No swelling of the ankles  No swollen glands  Patient is anxious   Other symptoms are in HPI        /70 (BP Location: Right arm, Patient Position: Sitting, Cuff Size: Adult)   Pulse 71   Temp 99 1 °F (37 3 °C)   Resp 20   Ht 5' 9 5" (1 765 m)   Wt 116 kg (256 lb 3 2 oz)   SpO2 97%   BMI 37 29 kg/m²      Physical Exam:  Alert, oriented, not in distress, no icterus, no oral thrush, no palpable neck mass, clear lung fields, regular heart rate, systolic murmur, abdomen  soft and non tender, no palpable abdominal mass, no ascites, umbilical hernia, no edema of ankles, no calf tenderness, no focal neurological deficit, no skin rash, no palpable lymphadenopathy, good arterial pulses, no clubbing  Patient is anxious  Performance status 1      IMAGING:  No orders to display       LABS:  Results for orders placed or performed in visit on 09/17/18   CBC and differential   Result Value Ref Range    White Blood Cell Count 2 9 (L) 3 8 - 10 8 Thousand/uL    Red Blood Cell Count 3 43 (L) 4 20 - 5 80 Million/uL    Hemoglobin 10 5 (L) 13 2 - 17 1 g/dL    HCT 31 2 (L) 38 5 - 50 0 %    MCV 91 0 80 0 - 100 0 fL    MCH 30 6 27 0 - 33 0 pg    MCHC 33 7 32 0 - 36 0 g/dL    RDW 14 3 11 0 - 15 0 %    Platelet Count 590 524 - 400 Thousand/uL    SL AMB MPV 9 0 7 5 - 12 5 fL   Differential   Result Value Ref Range    Neutrophils (Absolute) 1,734 1,500 - 7,800 cells/uL    SL AMB ABSOLUTE BAND NEUTROPHILS 61 0 - 750 cells/uL    SL AMB LYMPHOCYTES 658 (L) 850 - 3,900 cells/uL    Monocytes (Absolute) 180 (L) 200 - 950 cells/uL    Eosinophils (Absolute) 238 15 - 500 cells/uL    Basophils (Absolute) 29 0 - 200 cells/uL    SL AMB ABSOLUTE NUCLEATED RBC 29 (H) 0 cells/uL    Neutrophils 59 8 %    SL AMB BAND NEUTROPHILS 2 1 %    Lymphocytes 22 7 %    Monocytes 6 2 %    Eosinophils 8 2 %    Basophils Relative 1 0 %    SL AMB NUCLEATED RBC 1 (H) 0 /100 WBC    Note:       Labs, Imaging, & Other studies:   All pertinent labs and imaging studies were personally reviewed    Lab Results   Component Value Date     12/27/2017    K 4 8 12/27/2017     06/11/2018    CO2 27 06/11/2018    BUN 21 06/11/2018    CREATININE 0 90 06/11/2018    CALCIUM 9 0 06/11/2018    AST 23 12/27/2017    ALT 22 12/27/2017    ALKPHOS 257 (H) 06/11/2018    PROT 7 0 12/27/2017    BILITOT 0 5 12/27/2017     Lab Results   Component Value Date    WBC 2 9 (L) 09/17/2018    HGB 10 5 (L) 09/17/2018    HCT 31 2 (L) 09/17/2018    MCV 91 0 09/17/2018     09/17/2018       Reviewed and discussed with patient  Assessment and plan:  Patient is here with his wife  Patient is on Casodex, Lupron depot, Marleen Chilton and also on xofigo as of June 2018 and he has received 4 of 6 Xofigo treatments  Lupron depot was started in 2017 that worked for several months but then disease progressed in the bones  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  Blood counts are being monitored on xofigo  Prior to starting Xofigo in June 2018 PSA was 22 3 while PSA was 5 in April 2018  Patient has some discomfort in his upper flank areas bilaterally as before and it comes and goes  He does not have sense of taste  Once in awhile diarrhea   Greta Crespo has some tiredness and minor arthritic symptoms    Physical examination and test results are as recorded and discussed  He gets the Lupron depot and Casodex from his urologist   No change in therapy at present  PSA to be recheck after completion of xofigo  Condition discussed and explained  Questions answered  Patient has follow-up appointment in the office  Discussed the importance of eating healthy foods, staying active and health screening test   He has been taking calcium and vitamin-D and is staying active  1  Prostate cancer ([address] )    - Comprehensive metabolic panel; Future    2  Bone metastases (HCC)    - Comprehensive metabolic panel; Future          Patient voiced understanding and agreement in the discussion  Counseling / Coordination of Care   Greater than 50% of total time was spent with the patient and / or family counseling and / or coordination of care

## 2018-09-27 ENCOUNTER — TELEPHONE (OUTPATIENT)
Dept: HEMATOLOGY ONCOLOGY | Facility: CLINIC | Age: 83
End: 2018-09-27

## 2018-09-27 NOTE — TELEPHONE ENCOUNTER
Patient called  He had 3 XGEVA INJ scheduled within 6 weeks and he's normally scheduled for every three months  Per Elise Carlos, there was a schedule error  Pt is to keep the appointment for 10/11/18 and 1/31/19  Elise Carlos called infusion to have the extra appointments cancelled  I relayed this information to the pt

## 2018-10-04 ENCOUNTER — TELEPHONE (OUTPATIENT)
Dept: HEMATOLOGY ONCOLOGY | Facility: CLINIC | Age: 83
End: 2018-10-04

## 2018-10-04 ENCOUNTER — HOSPITAL ENCOUNTER (OUTPATIENT)
Dept: RADIOLOGY | Age: 83
Discharge: HOME | End: 2018-10-04

## 2018-10-04 DIAGNOSIS — C79.52 SECONDARY MALIGNANT NEOPLASM OF BONE AND BONE MARROW (HCC): ICD-10-CM

## 2018-10-04 DIAGNOSIS — D64.9 ANEMIA, UNSPECIFIED TYPE: ICD-10-CM

## 2018-10-04 DIAGNOSIS — C61 PROSTATE CANCER (HCC): Primary | ICD-10-CM

## 2018-10-04 DIAGNOSIS — C79.51 SECONDARY MALIGNANT NEOPLASM OF BONE AND BONE MARROW (HCC): ICD-10-CM

## 2018-10-04 NOTE — TELEPHONE ENCOUNTER
Karla Morrison called from Nuclear Medicine she has questions about the PT xoxigo Treatment  He has an appt today with her at 10:00  She wanted to clarify some things   Her EXT#[id number]

## 2018-10-04 NOTE — TELEPHONE ENCOUNTER
Patient's Hgb today was 9 8 and plt count was 138  Nuclear medicine is concerned that the patient's Hgb has been trending down and not recovering  Patient also told nuclear medicine that he has not been feeling well lately  Patient was to have his Xofigo treatment today and then 1 more treatment  KATHY Richey reviewed the patient's blood work  Per Eloisa Rodriguez, patient's Xofigo treatment is to be postponed 1 week  Patient is have a CBC with diff done next week prior to his treatment  Will mail the script to the patient's home address (patient uses [address])

## 2018-10-09 LAB
BASOPHILS # BLD AUTO: 0 CELLS/UL (ref 0–200)
BASOPHILS NFR BLD AUTO: 0 %
EOSINOPHIL # BLD AUTO: 40 CELLS/UL (ref 15–500)
EOSINOPHIL NFR BLD AUTO: 1 %
ERYTHROCYTE [DISTWIDTH] IN BLOOD BY AUTOMATED COUNT: 14.8 % (ref 11–15)
HCT VFR BLD AUTO: 28.7 % (ref 38.5–50)
HGB BLD-MCNC: 9.6 G/DL (ref 13.2–17.1)
LYMPHOCYTES # BLD MANUAL: 752 CELLS/UL (ref 850–3900)
LYMPHOCYTES NFR BLD AUTO: 18.8 %
MCH RBC QN AUTO: 30.5 PG (ref 27–33)
MCHC RBC AUTO-ENTMCNC: 33.4 G/DL (ref 32–36)
MCV RBC AUTO: 91.1 FL (ref 80–100)
METAMYELOCYTES # BLD: 84 CELLS/UL
METAMYELOCYTES NFR BLD MANUAL: 2.1 %
MONOCYTES # BLD AUTO: 376 CELLS/UL (ref 200–950)
MONOCYTES NFR BLD AUTO: 9.4 %
MYELOCYTES # BLD: 124 CELLS/UL
MYELOCYTES NFR BLD MANUAL: 3.1 %
NEUTROPHILS # BLD AUTO: 2292 CELLS/UL (ref 1500–7800)
NEUTROPHILS NFR BLD AUTO: 57.3 %
NEUTS BAND # BLD: 292 CELLS/UL (ref 0–750)
NEUTS BAND NFR BLD MANUAL: 7.3 %
NRBC # BLD: 40 CELLS/UL
NRBC BLD-RTO: 1 /100 WBC
PLATELET # BLD AUTO: 133 THOUSAND/UL (ref 140–400)
PMV BLD REES-ECKER: 9.3 FL (ref 7.5–12.5)
PROMYELOCYTES # BLD: 40 CELLS/UL
PROMYELOCYTES NFR BLD MANUAL: 1 %
RBC # BLD AUTO: 3.15 MILLION/UL (ref 4.2–5.8)
WBC # BLD AUTO: 4 THOUSAND/UL (ref 3.8–10.8)

## 2018-10-11 ENCOUNTER — HOSPITAL ENCOUNTER (OUTPATIENT)
Dept: INFUSION CENTER | Facility: CLINIC | Age: 83
Discharge: HOME | End: 2018-10-11
Payer: MEDICARE

## 2018-10-11 VITALS
SYSTOLIC BLOOD PRESSURE: 110 MMHG | DIASTOLIC BLOOD PRESSURE: 70 MMHG | RESPIRATION RATE: 18 BRPM | TEMPERATURE: 98.9 F | HEART RATE: 79 BPM

## 2018-10-11 PROCEDURE — 96401 CHEMO ANTI-NEOPL SQ/IM: CPT

## 2018-10-11 RX ADMIN — DENOSUMAB 120 MG: 120 INJECTION SUBCUTANEOUS at 11:19

## 2018-10-16 LAB
ALBUMIN SERPL-MCNC: 3.5 G/DL (ref 3.6–5.1)
ALBUMIN/GLOB SERPL: 1.1 (CALC) (ref 1–2.5)
ALP SERPL-CCNC: 358 U/L (ref 40–115)
ALT SERPL-CCNC: 28 U/L (ref 9–46)
AST SERPL-CCNC: 35 U/L (ref 10–35)
BASOPHILS # BLD AUTO: 0 CELLS/UL (ref 0–200)
BASOPHILS NFR BLD AUTO: 0 %
BILIRUB SERPL-MCNC: 0.6 MG/DL (ref 0.2–1.2)
BUN SERPL-MCNC: 18 MG/DL (ref 7–25)
BUN/CREAT SERPL: ABNORMAL (CALC) (ref 6–22)
CALCIUM SERPL-MCNC: 8.1 MG/DL (ref 8.6–10.3)
CHLORIDE SERPL-SCNC: 101 MMOL/L (ref 98–110)
CHOLEST SERPL-MCNC: 147 MG/DL
CHOLEST/HDLC SERPL: 4.2 (CALC)
CO2 SERPL-SCNC: 25 MMOL/L (ref 20–32)
CREAT SERPL-MCNC: 0.84 MG/DL (ref 0.7–1.11)
EOSINOPHIL # BLD AUTO: 0 CELLS/UL (ref 15–500)
EOSINOPHIL NFR BLD AUTO: 0 %
ERYTHROCYTE [DISTWIDTH] IN BLOOD BY AUTOMATED COUNT: 14.8 % (ref 11–15)
EST. AVERAGE GLUCOSE BLD GHB EST-MCNC: 134 (CALC)
EST. AVERAGE GLUCOSE BLD GHB EST-SCNC: 7.4 (CALC)
GLOBULIN SER CALC-MCNC: 3.1 G/DL (CALC) (ref 1.9–3.7)
GLUCOSE SERPL-MCNC: 119 MG/DL (ref 65–99)
HBA1C MFR BLD: 6.3 % OF TOTAL HGB
HCT VFR BLD AUTO: 26.5 % (ref 38.5–50)
HDLC SERPL-MCNC: 35 MG/DL
HGB BLD-MCNC: 9.1 G/DL (ref 13.2–17.1)
LDLC SERPL CALC-MCNC: 79 MG/DL (CALC)
LYMPHOCYTES # BLD MANUAL: 820 CELLS/UL (ref 850–3900)
LYMPHOCYTES NFR BLD AUTO: 20 %
MCH RBC QN AUTO: 31.5 PG (ref 27–33)
MCHC RBC AUTO-ENTMCNC: 34.3 G/DL (ref 32–36)
MCV RBC AUTO: 91.7 FL (ref 80–100)
METAMYELOCYTES # BLD: 123 CELLS/UL
METAMYELOCYTES NFR BLD MANUAL: 3 %
MONOCYTES # BLD AUTO: 574 CELLS/UL (ref 200–950)
MONOCYTES NFR BLD AUTO: 14 %
NEUTROPHILS # BLD AUTO: 2296 CELLS/UL (ref 1500–7800)
NEUTROPHILS NFR BLD AUTO: 56 %
NEUTS BAND # BLD: 287 CELLS/UL (ref 0–750)
NEUTS BAND NFR BLD MANUAL: 7 %
NONHDLC SERPL-MCNC: 112 MG/DL (CALC)
PLATELET # BLD AUTO: ABNORMAL THOUSAND/UL
POTASSIUM SERPL-SCNC: 4.5 MMOL/L (ref 3.5–5.3)
PROT SERPL-MCNC: 6.6 G/DL (ref 6.1–8.1)
RBC # BLD AUTO: 2.89 MILLION/UL (ref 4.2–5.8)
SL AMB EGFR AFRICAN AMERICAN: 95 ML/MIN/1.73M2
SL AMB EGFR NON AFRICAN AMERICAN: 82 ML/MIN/1.73M2
SODIUM SERPL-SCNC: 135 MMOL/L (ref 135–146)
TRIGL SERPL-MCNC: 254 MG/DL
WBC # BLD AUTO: 4.1 THOUSAND/UL (ref 3.8–10.8)

## 2018-10-18 ENCOUNTER — OFFICE VISIT (OUTPATIENT)
Dept: FAMILY MEDICINE CLINIC | Facility: CLINIC | Age: 83
End: 2018-10-18
Payer: MEDICARE

## 2018-10-18 VITALS
DIASTOLIC BLOOD PRESSURE: 78 MMHG | WEIGHT: 246.8 LBS | BODY MASS INDEX: 35.33 KG/M2 | HEIGHT: 70 IN | SYSTOLIC BLOOD PRESSURE: 140 MMHG

## 2018-10-18 DIAGNOSIS — R53.0 NEOPLASTIC MALIGNANT RELATED FATIGUE: ICD-10-CM

## 2018-10-18 DIAGNOSIS — R73.09 OTHER ABNORMAL GLUCOSE: ICD-10-CM

## 2018-10-18 DIAGNOSIS — C79.51 BONE METASTASES (HCC): ICD-10-CM

## 2018-10-18 DIAGNOSIS — C79.9 METASTATIC DISEASE (HCC): ICD-10-CM

## 2018-10-18 DIAGNOSIS — I12.9 BENIGN HYPERTENSIVE CKD, STAGE 1-4 OR UNSPECIFIED CHRONIC KIDNEY DISEASE: Primary | ICD-10-CM

## 2018-10-18 DIAGNOSIS — N18.2 CKD (CHRONIC KIDNEY DISEASE) STAGE 2, GFR 60-89 ML/MIN: ICD-10-CM

## 2018-10-18 DIAGNOSIS — D63.8 ANEMIA IN OTHER CHRONIC DISEASES CLASSIFIED ELSEWHERE: ICD-10-CM

## 2018-10-18 DIAGNOSIS — C61 PROSTATE CANCER (HCC): ICD-10-CM

## 2018-10-18 PROBLEM — H26.9 CATARACT OF LEFT EYE: Status: RESOLVED | Noted: 2018-06-26 | Resolved: 2018-10-18

## 2018-10-18 PROCEDURE — 99214 OFFICE O/P EST MOD 30 MIN: CPT | Performed by: FAMILY MEDICINE

## 2018-10-18 NOTE — PROGRESS NOTES
Assessment/Plan:  Patient's blood pressure stable at this time  Will need to monitor him if he continues to lose weight especially to make sure that he is not becoming orthostatic  Patient's GFR has now improved  Unfortunately the patient is anemic likely from his recent chemotherapy  He has stopped his last to infusions due to feeling harm will from the side effects  We will recommend updating a CBC with diagnostic PSA a week before his visit with Oncology around [place]  Diet reviewed as best as possible with patient  Patient's A1c is now in the non prediabetic range  Flu shot up-to-date  Diagnoses and all orders for this visit:    Benign hypertensive CKD, stage 1-4 or unspecified chronic kidney disease    CKD (chronic kidney disease) stage 2, GFR 60-89 ml/min    Anemia in other chronic diseases classified elsewhere  -     CBC; Future    Prostate cancer (HCC)  -     PSA Total, Diagnostic; Future    Bone metastases ([address] )    Metastatic disease ([address] )    Other abnormal glucose    Neoplastic malignant related fatigue        1  Benign hypertensive CKD, stage 1-4 or unspecified chronic kidney disease     2  CKD (chronic kidney disease) stage 2, GFR 60-89 ml/min     3  Anemia in other chronic diseases classified elsewhere  CBC   4  Prostate cancer (HCC)  PSA Total, Diagnostic   5  Bone metastases ([address] )     6  Metastatic disease ([address] )     7  Other abnormal glucose     8  Neoplastic malignant related fatigue         Subjective:        Patient ID: Dennis Cruz is a 81 y o  male  Chief Complaint   Patient presents with    Follow-up     6 months, pt states that he is tired and not hungry  Pt states that he stopped treatment for prostate cancer       Patient for 6 month check regarding blood pressure and labs  Unfortunately is fairly wiped out from going through 4 rounds of chemotherapy  He has declined going for the final 2 rounds          The following portions of the patient's history were reviewed and updated as appropriate: past medical history, past surgical history and problem list       Review of Systems   Constitutional: Positive for appetite change and fatigue  Negative for fever and unexpected weight change  HENT: Negative for congestion, ear pain, postnasal drip, rhinorrhea, sinus pain, sinus pressure and sore throat  Eyes: Negative for redness and visual disturbance  Respiratory: Negative for chest tightness and shortness of breath  Cardiovascular: Negative for chest pain, palpitations and leg swelling  Gastrointestinal: Negative for abdominal distention, abdominal pain, diarrhea and nausea  Endocrine: Negative for cold intolerance and heat intolerance  Genitourinary: Negative for dysuria and hematuria  Musculoskeletal: Negative for arthralgias, gait problem and myalgias  Skin: Negative for pallor and rash  Neurological: Negative for dizziness and headaches  Psychiatric/Behavioral: Negative for behavioral problems  The patient is not nervous/anxious  Objective:  /78 (BP Location: Left arm, Patient Position: Sitting, Cuff Size: Standard)   Ht 5' 9 5" (1 765 m)   Wt 112 kg (246 lb 12 8 oz)   BMI 35 92 kg/m²        Physical Exam   Constitutional: He is oriented to person, place, and time  Tired in appearance   HENT:   Head: Normocephalic and atraumatic  Right Ear: Tympanic membrane and external ear normal    Left Ear: Tympanic membrane and external ear normal    Nose: Nose normal    Mouth/Throat: Oropharynx is clear and moist    Eyes: Pupils are equal, round, and reactive to light  Conjunctivae and EOM are normal  No scleral icterus  Neck: Neck supple  No thyromegaly present  Cardiovascular: Normal rate and regular rhythm  No murmur heard  Pulmonary/Chest: Effort normal and breath sounds normal  He has no wheezes  Abdominal: Soft  Bowel sounds are normal  He exhibits no distension and no mass  There is no tenderness     Musculoskeletal: He exhibits edema    Trace pretibial edema   Lymphadenopathy:     He has no cervical adenopathy  Neurological: He is alert and oriented to person, place, and time  No cranial nerve deficit  Skin: Skin is warm  No pallor  Psychiatric: He has a normal mood and affect  His behavior is normal  Thought content normal    Nursing note and vitals reviewed

## 2018-10-22 ENCOUNTER — TELEPHONE (OUTPATIENT)
Dept: FAMILY MEDICINE CLINIC | Facility: CLINIC | Age: 83
End: 2018-10-22

## 2018-10-22 NOTE — TELEPHONE ENCOUNTER
Tell the patient to break his lisinopril 20 milligrams in half to take 10 milligrams daily and update us in 2 weeks

## 2018-10-26 ENCOUNTER — TELEPHONE (OUTPATIENT)
Dept: UROLOGY | Facility: CLINIC | Age: 83
End: 2018-10-26

## 2018-10-26 ENCOUNTER — TELEPHONE (OUTPATIENT)
Dept: HEMATOLOGY ONCOLOGY | Facility: CLINIC | Age: 83
End: 2018-10-26

## 2018-10-26 DIAGNOSIS — T45.1X5A ANEMIA DUE TO ANTINEOPLASTIC CHEMOTHERAPY: ICD-10-CM

## 2018-10-26 DIAGNOSIS — C61 PROSTATE CANCER (HCC): Primary | ICD-10-CM

## 2018-10-26 DIAGNOSIS — D64.81 ANEMIA DUE TO ANTINEOPLASTIC CHEMOTHERAPY: ICD-10-CM

## 2018-10-26 NOTE — TELEPHONE ENCOUNTER
Patient cancelled his appointment today  He is due for his Lupron, please ensure he reschedules as soon as possible

## 2018-10-26 NOTE — TELEPHONE ENCOUNTER
Patients wife is calling because her  stopped treatment 2 weeks ago due to side effects for his prostate ca   He is very weak,no appetite /70

## 2018-10-26 NOTE — TELEPHONE ENCOUNTER
Called and spoke with the patient's wife in regards to the patient's side effects from his Xofigo treatments  Patient was unable to get his last 2 treatments due to decreasing Hgb levels and fatigue  Patient's wife states that his BP is the lowest it's ever been and the patient is still weak  Patient's wife is asking if the patient can be scheduled for a sooner apointment  Patient was scheduled to see Ro Barton on 11/7/18 @ 12:30 at [facility]  Patient's wife told to have the patient obtain a CBC, CMP, and total PSA next week to f/u on the patient's anemia  Script for the blood work was mailed to the patient's home address

## 2018-10-29 NOTE — TELEPHONE ENCOUNTER
Spoke with patient  Advised patient he is overdue for his Lupron  Patient reports he can go to [facility], 10/30/18, to have PSA drawn  Scheduled for appointment with Sunitha Garcia PA-C on 11/2/18 at 2:15 in the [address] [address] office

## 2018-10-31 LAB — PSA SERPL-MCNC: 312.4 NG/ML

## 2018-10-31 NOTE — PROGRESS NOTES
11/2/2018      Chief Complaint   Patient presents with    Follow-up    Prostate Cancer     MFP=[phone] 4 (10/30)       Assessment and Plan    81 y o  male managed by Dr Jimbo Parks    1  Stage IV [place] 8 prostate cancer s/p IMRT (2011) and ADT (4/3/2017) with bony metastasis  -  4 (10/30/18), 22 3 (6/11/18)  - 6 month depot of Lupron administered today  - continue on combined androgen blockade with Lupron every 6 months indefinitely and Casodex   - continue on [place] with hematology oncology, seeing them 11/7/18  - FU 6 months with PSA prior       History of Present Illness  Kathryn Jasso is a 81 y o  male here for follow up evaluation of Stage IV [place] 8 prostate cancer status post IMRT (2011) and ADT (initiated 4/3/2017) with bony mets  Most recent bone scan 5/22/2018 reveals worsening/progression of diffuse osseous metastasis  Currently on continuous Lupron (4/2017), Casodex (4/2018), Xgeva (9/2017), and is s/p 4 xofigo treatments (6/2018-9/2018)  The patient underwent 4 xofigo infusions and was unable to tolerate more than this  Last Lupron 4/24/18  He continues with Hematology Oncology  His most recent PSA has significantly risen to 312 4 obtained 10/30/2018  Previously 22 3 (6/11/18)  Denies any bone pain  Also denies hot flashes  LUTs as below  Patient has no urinary complaints  Review of Systems   Constitutional: Negative for activity change, chills and fever  Gastrointestinal: Negative for abdominal distention and abdominal pain  Musculoskeletal: Negative for back pain and gait problem  Psychiatric/Behavioral: Negative for behavioral problems and confusion  Urinary Incontinence Screening      Most Recent Value   Urinary Incontinence   Urinary Incontinence? No   Incomplete emptying? No   Urinary frequency? No   Urinary urgency? No   Urinary hesitancy? No   Dysuria (painful difficult urination)? No   Nocturia (waking up to use the bathroom)?   Yes [1-2x] Straining (having to push to go)? No   Weak stream?  No   Intermittent stream?  Yes   Post void dribbling? No        AUA SYMPTOM SCORE      Most Recent Value   AUA SYMPTOM SCORE   How often have you had a sensation of not emptying your bladder completely after you finished urinating? 0   How often have you had to urinate again less than two hours after you finished urinating? 0   How often have you found you stopped and started again several times when you urinate? 1   How often have you found it difficult to postpone urination? 0   How often have you had a weak urinary stream?  0   How often have you had to push or strain to begin urination? 0   How many times did you most typically get up to urinate from the time you went to bed at night until the time you got up in the morning? 3   Quality of Life: If you were to spend the rest of your life with your urinary condition just the way it is now, how would you feel about that?  1   AUA SYMPTOM SCORE  4          Past Medical History  Past Medical History:   Diagnosis Date    Acute bronchitis     Hypertension     Prostate CA ([address] )     Urinary frequency     UTI (urinary tract infection)        Past Social History  Past Surgical History:   Procedure Laterality Date    CHOLECYSTECTOMY      COLONOSCOPY      [id number] ([place])     History   Smoking Status    Former Smoker    Quit date: 1977   Smokeless Tobacco    Never Used       Past Family History  Family History   Problem Relation Age of Onset    Breast cancer Mother     Prostate cancer Father     Prostate cancer Brother     Stroke Family         CVA    Hypertension Family        Past Social history  Social History     Social History    Marital status: /Civil Union     Spouse name: N/A    Number of children: N/A    Years of education: N/A     Occupational History    Not on file       Social History Main Topics    Smoking status: Former Smoker     Quit date: 1977    Smokeless tobacco: Never Used    Alcohol use Yes      Comment: 1/day  Per [employer]: Social    Drug use: No    Sexual activity: Not on file     Other Topics Concern    Not on file     Social History Narrative    No narrative on file       Current Medications  Current Outpatient Prescriptions   Medication Sig Dispense Refill    bicalutamide (CASODEX) 50 mg tablet Take 1 tablet (50 mg total) by mouth daily 90 tablet 3    brimonidine-timolol (COMBIGAN) 0 2-0 5 % Administer 1 drop to both eyes every 12 (twelve) hours      cholecalciferol (VITAMIN D3) 1,000 units tablet Take 1,000 Units by mouth daily      dorzolamide (TRUSOPT) 2 % ophthalmic solution 1 drop 3 (three) times a day      lisinopril (ZESTRIL) 20 mg tablet Take 10 mg by mouth daily        metoprolol tartrate (LOPRESSOR) 25 mg tablet Take 25 mg by mouth every 12 (twelve) hours      Multiple Vitamins-Minerals (PRESERVISION/LUTEIN) CAPS Take by mouth daily      multivitamin (THERAGRAN) TABS Take 1 tablet by mouth daily      ondansetron (ZOFRAN) 8 mg tablet Take 1 tablet (8 mg total) by mouth every 12 (twelve) hours as needed for nausea or vomiting 30 tablet 1    travoprost (TRAVATAN Z) 0 004 % ophthalmic solution 1 drop daily at bedtime      Vitamins C E (VITAMIN C/VITAMIN E PO) Take by mouth      aspirin (ECOTRIN LOW STRENGTH) 81 mg EC tablet Take 81 mg by mouth daily      brimonidine (ALPHAGAN P) 0 1 % INSTILL 1 DROP TWICE A DAY INTO BOTH EYES       No current facility-administered medications for this visit          Allergies  No Known Allergies      The following portions of the patient's history were reviewed and updated as appropriate: allergies, current medications, past medical history, past social history, past surgical history and problem list       Vitals  Vitals:    11/02/18 1411   Weight: 109 kg (240 lb 3 2 oz)   Height: 5' 10" (1 778 m)           Physical Exam  Constitutional   General appearance: Patient is seated and in no acute distress, well appearing and well nourished  Head and Face   Head and face: Normal     Eyes   Conjunctiva and lids: No erythema, swelling or discharge  Ears, Nose, Mouth, and Throat   Hearing: Normal     Pulmonary   Respiratory effort: No increased work of breathing or signs of respiratory distress  Cardiovascular   Examination of extremities for edema and/or varicosities: Normal     Abdomen   Abdomen: Non-tender, no masses  Musculoskeletal   Gait and station: Normal     Skin   Skin and subcutaneous tissue: Warm, dry, and intact  No visible lesions or rashes  Psychiatric   Judgment and insight: Normal  Recent and remote memory:  Normal  Mood and affect: Normal      Results  No results found for this or any previous visit (from the past 1 hour(s)) ]  Lab Results   Component Value Date     4 (H) 10/30/2018    PSA 22 3 (H) 06/11/2018     Lab Results   Component Value Date    CALCIUM 8 1 (L) 10/15/2018     12/27/2017    K 4 5 10/15/2018    CO2 25 10/15/2018     10/15/2018    BUN 18 10/15/2018    CREATININE 0 98 12/27/2017     Lab Results   Component Value Date    WBC 4 1 10/15/2018    HGB 9 1 (L) 10/15/2018    HCT 26 5 (L) 10/15/2018    MCV 91 7 10/15/2018    PLT TNP 10/15/2018           Orders  No orders of the defined types were placed in this encounter

## 2018-11-02 ENCOUNTER — OFFICE VISIT (OUTPATIENT)
Dept: UROLOGY | Facility: CLINIC | Age: 83
End: 2018-11-02
Payer: MEDICARE

## 2018-11-02 VITALS
HEIGHT: 70 IN | DIASTOLIC BLOOD PRESSURE: 68 MMHG | HEART RATE: 111 BPM | WEIGHT: 240.2 LBS | SYSTOLIC BLOOD PRESSURE: 110 MMHG | BODY MASS INDEX: 34.39 KG/M2

## 2018-11-02 DIAGNOSIS — C61 PROSTATE CANCER (HCC): Primary | ICD-10-CM

## 2018-11-02 PROCEDURE — 99213 OFFICE O/P EST LOW 20 MIN: CPT | Performed by: PHYSICIAN ASSISTANT

## 2018-11-07 ENCOUNTER — APPOINTMENT (OUTPATIENT)
Dept: LAB | Facility: MEDICAL CENTER | Age: 83
End: 2018-11-07
Payer: MEDICARE

## 2018-11-07 ENCOUNTER — OFFICE VISIT (OUTPATIENT)
Dept: HEMATOLOGY ONCOLOGY | Facility: CLINIC | Age: 83
End: 2018-11-07
Payer: MEDICARE

## 2018-11-07 VITALS
HEART RATE: 73 BPM | OXYGEN SATURATION: 99 % | TEMPERATURE: 98.6 F | HEIGHT: 69 IN | WEIGHT: 240 LBS | SYSTOLIC BLOOD PRESSURE: 120 MMHG | DIASTOLIC BLOOD PRESSURE: 60 MMHG | RESPIRATION RATE: 18 BRPM | BODY MASS INDEX: 35.55 KG/M2

## 2018-11-07 DIAGNOSIS — C79.9 METASTATIC DISEASE (HCC): ICD-10-CM

## 2018-11-07 DIAGNOSIS — D64.81 ANEMIA DUE TO ANTINEOPLASTIC CHEMOTHERAPY: ICD-10-CM

## 2018-11-07 DIAGNOSIS — C61 PROSTATE CANCER (HCC): ICD-10-CM

## 2018-11-07 DIAGNOSIS — D64.9 ANEMIA, UNSPECIFIED TYPE: Primary | ICD-10-CM

## 2018-11-07 DIAGNOSIS — C79.51 BONE METASTASES (HCC): Primary | ICD-10-CM

## 2018-11-07 DIAGNOSIS — D64.9 ANEMIA, UNSPECIFIED TYPE: ICD-10-CM

## 2018-11-07 DIAGNOSIS — D63.8 ANEMIA IN OTHER CHRONIC DISEASES CLASSIFIED ELSEWHERE: ICD-10-CM

## 2018-11-07 DIAGNOSIS — R11.0 NAUSEA IN ADULT: ICD-10-CM

## 2018-11-07 DIAGNOSIS — T45.1X5A ANEMIA DUE TO ANTINEOPLASTIC CHEMOTHERAPY: ICD-10-CM

## 2018-11-07 LAB
ALBUMIN SERPL BCP-MCNC: 2.8 G/DL (ref 3.5–5)
ALBUMIN SERPL-MCNC: 3.4 G/DL (ref 3.6–5.1)
ALBUMIN/GLOB SERPL: 1.2 (CALC) (ref 1–2.5)
ALP SERPL-CCNC: 462 U/L (ref 40–115)
ALP SERPL-CCNC: 485 U/L (ref 46–116)
ALT SERPL W P-5'-P-CCNC: 35 U/L (ref 12–78)
ALT SERPL-CCNC: 26 U/L (ref 9–46)
ANION GAP SERPL CALCULATED.3IONS-SCNC: 4 MMOL/L (ref 4–13)
AST SERPL W P-5'-P-CCNC: 45 U/L (ref 5–45)
AST SERPL-CCNC: 35 U/L (ref 10–35)
BASOPHILS # BLD AUTO: 0 CELLS/UL (ref 0–200)
BASOPHILS NFR BLD AUTO: 0 %
BILIRUB SERPL-MCNC: 0.38 MG/DL (ref 0.2–1)
BILIRUB SERPL-MCNC: 0.6 MG/DL (ref 0.2–1.2)
BUN SERPL-MCNC: 17 MG/DL (ref 7–25)
BUN SERPL-MCNC: 19 MG/DL (ref 5–25)
BUN/CREAT SERPL: ABNORMAL (CALC) (ref 6–22)
CALCIUM SERPL-MCNC: 8.2 MG/DL (ref 8.6–10.3)
CALCIUM SERPL-MCNC: 8.6 MG/DL (ref 8.3–10.1)
CHLORIDE SERPL-SCNC: 103 MMOL/L (ref 98–110)
CHLORIDE SERPL-SCNC: 104 MMOL/L (ref 100–108)
CO2 SERPL-SCNC: 25 MMOL/L (ref 20–32)
CO2 SERPL-SCNC: 27 MMOL/L (ref 21–32)
CREAT SERPL-MCNC: 0.8 MG/DL (ref 0.7–1.11)
CREAT SERPL-MCNC: 0.84 MG/DL (ref 0.6–1.3)
EOSINOPHIL # BLD AUTO: 46 CELLS/UL (ref 15–500)
EOSINOPHIL NFR BLD AUTO: 1 %
ERYTHROCYTE [DISTWIDTH] IN BLOOD BY AUTOMATED COUNT: 16 % (ref 11–15)
GFR SERPL CREATININE-BSD FRML MDRD: 82 ML/MIN/1.73SQ M
GLOBULIN SER CALC-MCNC: 2.9 G/DL (CALC) (ref 1.9–3.7)
GLUCOSE SERPL-MCNC: 102 MG/DL (ref 65–140)
GLUCOSE SERPL-MCNC: 111 MG/DL (ref 65–99)
HCT VFR BLD AUTO: 23.4 % (ref 38.5–50)
HGB BLD-MCNC: 7.8 G/DL (ref 13.2–17.1)
LYMPHOCYTES # BLD MANUAL: 1288 CELLS/UL (ref 850–3900)
LYMPHOCYTES NFR BLD AUTO: 28 %
MCH RBC QN AUTO: 30.6 PG (ref 27–33)
MCHC RBC AUTO-ENTMCNC: 33.3 G/DL (ref 32–36)
MCV RBC AUTO: 91.8 FL (ref 80–100)
METAMYELOCYTES # BLD: 92 CELLS/UL
METAMYELOCYTES NFR BLD MANUAL: 2 %
MONOCYTES # BLD AUTO: 230 CELLS/UL (ref 200–950)
MONOCYTES NFR BLD AUTO: 5 %
MYELOCYTES # BLD: 46 CELLS/UL
MYELOCYTES NFR BLD MANUAL: 1 %
NEUTROPHILS # BLD AUTO: 2714 CELLS/UL (ref 1500–7800)
NEUTROPHILS NFR BLD AUTO: 59 %
NEUTS BAND # BLD: 184 CELLS/UL (ref 0–750)
NEUTS BAND NFR BLD MANUAL: 4 %
NRBC # BLD: 184 CELLS/UL
NRBC BLD-RTO: 4 /100 WBC
PLATELET # BLD AUTO: 51 THOUSAND/UL (ref 140–400)
PMV BLD REES-ECKER: 9.5 FL (ref 7.5–12.5)
POTASSIUM SERPL-SCNC: 4.6 MMOL/L (ref 3.5–5.3)
POTASSIUM SERPL-SCNC: 5 MMOL/L (ref 3.5–5.3)
PROT SERPL-MCNC: 6.3 G/DL (ref 6.1–8.1)
PROT SERPL-MCNC: 7 G/DL (ref 6.4–8.2)
PSA SERPL-MCNC: 274.6 NG/ML
RBC # BLD AUTO: 2.55 MILLION/UL (ref 4.2–5.8)
SL AMB EGFR AFRICAN AMERICAN: 96 ML/MIN/1.73M2
SL AMB EGFR NON AFRICAN AMERICAN: 83 ML/MIN/1.73M2
SODIUM SERPL-SCNC: 135 MMOL/L (ref 136–145)
SODIUM SERPL-SCNC: 136 MMOL/L (ref 135–146)
WBC # BLD AUTO: 4.6 THOUSAND/UL (ref 3.8–10.8)

## 2018-11-07 PROCEDURE — 36415 COLL VENOUS BLD VENIPUNCTURE: CPT

## 2018-11-07 PROCEDURE — 86920 COMPATIBILITY TEST SPIN: CPT

## 2018-11-07 PROCEDURE — 86900 BLOOD TYPING SEROLOGIC ABO: CPT

## 2018-11-07 PROCEDURE — 84153 ASSAY OF PSA TOTAL: CPT

## 2018-11-07 PROCEDURE — 99214 OFFICE O/P EST MOD 30 MIN: CPT | Performed by: PHYSICIAN ASSISTANT

## 2018-11-07 PROCEDURE — 80053 COMPREHEN METABOLIC PANEL: CPT

## 2018-11-07 PROCEDURE — 86850 RBC ANTIBODY SCREEN: CPT

## 2018-11-07 PROCEDURE — 86901 BLOOD TYPING SEROLOGIC RH(D): CPT

## 2018-11-07 RX ORDER — ONDANSETRON HYDROCHLORIDE 8 MG/1
8 TABLET, FILM COATED ORAL EVERY 8 HOURS PRN
Qty: 30 TABLET | Refills: 0 | Status: SHIPPED | OUTPATIENT
Start: 2018-11-07 | End: 2018-11-23 | Stop reason: SDUPTHER

## 2018-11-07 NOTE — PROGRESS NOTES
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  Patient recently was treated with Pooja 0  He still continues to receive Lupron and takes Casodex daily as well  He was unable to receive cycle 5 and 6 of Xofigo due to side effects  He continues to have nausea, fatigue at this time  Patient is noted to have a hemoglobin of 7 8  Platelets have decreased to 51,000  Patient states that he has been taking NSAIDs 2 tabs by mouth up to 3 times per day for approximately the past month  He states he is not sure why he is taking this he thinks that it would help him feel better however he is not in any pain  He is also not eating well also he may have developed an ulcer which can lead to bleeding  He does admit to some darker stools  Stool test to be performed this week  Patient received 2 units of PRBCs this week  Dr Mu Peter obtained blood consent  He will follow up in our office next week for further planning  4  Nausea in adult  Zofran to be taken every 8 hr  If this is not beneficial he is to call our office  Compazine can be added  - ondansetron (ZOFRAN) 8 mg tablet; Take 1 tablet (8 mg total) by mouth every 8 (eight) hours as needed for nausea or vomiting  Dispense: 30 tablet; Refill: 0    5  Anemia, unspecified type  See above  - Occult Blood, Fecal Immunochemical    Patient was given written information in regards to managing his constipation  HPI:  81-year-old male with history of stage IV prostate cancer  In 2011 patient had IMRT in [facility] for cancer of the prostate  He started on Lupron in April 2017  He responded with this  He presented to our office in August 2017 for castrate resistant prostate cancer, stage IV, with bone metastases     He was recommended to take Zytiga or Geoffry Nunez however he did not want to do this  PSA was coming down  He was seen in our office in May 2018 that time PSA increased from 5-11 4  Once again it was discussed for patient to have additional treatment  He had a CT of the chest abdomen pelvis on 05/22/2018  This showed multiple new an extensive sclerotic metastases within the axial in appendicular skeleton  No evidence of nodular or other soft tissue metastatic disease  Bone scan at the same time also showed interval worsening/progression of diffuse bony metastases  PSA increased to 22 3 in June 2018  Patient began treatment with Geoffry Nunez in June 2018  Patient was only able to complete 4 cycles due to poor tolerability of side effects  His last treatment was on 09/06/2018  Interval history:    Ibuprofen 2 tablets 2-3 times per day for the past couple of weeks to 1 month  He has pain across his chest some time  ROS: Review of Systems   Constitutional: Positive for fatigue  Respiratory: Negative for cough and shortness of breath  Gastrointestinal: Positive for constipation and nausea  Negative for abdominal pain, diarrhea and vomiting  Darker stools than normal      Genitourinary: Negative for difficulty urinating  Skin: Positive for pallor  Neurological: Positive for dizziness, weakness and light-headedness  Hematological: Negative for adenopathy  Does not bruise/bleed easily         Past Medical History:   Diagnosis Date    Acute bronchitis     Hypertension     Prostate CA ([address] )     Urinary frequency     UTI (urinary tract infection)        Past Surgical History:   Procedure Laterality Date    CHOLECYSTECTOMY      COLONOSCOPY      [id number] ([place])       Social History     Social History    Marital status: /Civil Union     Spouse name: N/A    Number of children: N/A    Years of education: N/A     Social History Main Topics    Smoking status: Former Smoker     Quit date: 1977    Smokeless tobacco: Never Used    Alcohol use Yes      Comment: 1/day   Per Allscripts: Social    Drug use: No    Sexual activity: Not on file     Other Topics Concern    Not on file     Social History Narrative    No narrative on file       Family History   Problem Relation Age of Onset    Breast cancer Mother     Prostate cancer Father     Prostate cancer Brother     Stroke Family         CVA    Hypertension Family        No Known Allergies      Current Outpatient Prescriptions:     aspirin (ECOTRIN LOW STRENGTH) 81 mg EC tablet, Take 81 mg by mouth daily, Disp: , Rfl:     bicalutamide (CASODEX) 50 mg tablet, Take 1 tablet (50 mg total) by mouth daily, Disp: 90 tablet, Rfl: 3    brimonidine (ALPHAGAN P) 0 1 %, INSTILL 1 DROP TWICE A DAY INTO BOTH EYES, Disp: , Rfl:     brimonidine-timolol (COMBIGAN) 0 2-0 5 %, Administer 1 drop to both eyes every 12 (twelve) hours, Disp: , Rfl:     cholecalciferol (VITAMIN D3) 1,000 units tablet, Take 1,000 Units by mouth daily, Disp: , Rfl:     dorzolamide (TRUSOPT) 2 % ophthalmic solution, 1 drop 3 (three) times a day, Disp: , Rfl:     lisinopril (ZESTRIL) 20 mg tablet, Take 10 mg by mouth daily  , Disp: , Rfl:     metoprolol tartrate (LOPRESSOR) 25 mg tablet, Take 25 mg by mouth every 12 (twelve) hours, Disp: , Rfl:     Multiple Vitamins-Minerals (PRESERVISION/LUTEIN) CAPS, Take by mouth daily, Disp: , Rfl:     multivitamin (THERAGRAN) TABS, Take 1 tablet by mouth daily, Disp: , Rfl:     ondansetron (ZOFRAN) 8 mg tablet, Take 1 tablet (8 mg total) by mouth every 12 (twelve) hours as needed for nausea or vomiting, Disp: 30 tablet, Rfl: 1    travoprost (TRAVATAN Z) 0 004 % ophthalmic solution, 1 drop daily at bedtime, Disp: , Rfl:     Vitamins C E (VITAMIN C/VITAMIN E PO), Take by mouth, Disp: , Rfl:       Physical Exam:  /60   Pulse 73   Temp 98 6 °F (37 °C) (Tympanic)   Resp 18   Ht 5' 9" (1 753 m)   Wt 109 kg (240 lb)   SpO2 99%   BMI 35 44 kg/m²     Physical Exam   Constitutional: He is oriented to person, place, and time  He appears well-developed and well-nourished  No distress  Appears very fatigued   HENT:   Head: Normocephalic and atraumatic  Eyes: Conjunctivae are normal  No scleral icterus  Neck: Normal range of motion  Neck supple  Cardiovascular: Normal rate, regular rhythm and normal heart sounds  No murmur heard  Pulmonary/Chest: Effort normal and breath sounds normal  No respiratory distress  Abdominal: Soft  There is no tenderness  Musculoskeletal: Normal range of motion  He exhibits no edema or tenderness  Lymphadenopathy:     He has no cervical adenopathy  Neurological: He is alert and oriented to person, place, and time  No cranial nerve deficit  Skin: Skin is warm and dry  There is pallor  Psychiatric: He has a normal mood and affect  Vitals reviewed  Labs:  Lab Results   Component Value Date    WBC 4 6 11/05/2018    HGB 7 8 (L) 11/05/2018    HCT 23 4 (L) 11/05/2018    MCV 91 8 11/05/2018    PLT 51 (L) 11/05/2018     Lab Results   Component Value Date     12/27/2017    K 4 6 11/05/2018     11/05/2018    CO2 25 11/05/2018    BUN 17 11/05/2018    CREATININE 0 98 12/27/2017    CALCIUM 8 2 (L) 11/05/2018    AST 23 12/27/2017    ALT 22 12/27/2017    ALKPHOS 462 (H) 11/05/2018    PROT 7 0 12/27/2017    BILITOT 0 5 12/27/2017       Patient voiced understanding and agreement in the above discussion  Aware to contact our office with questions/symptoms in the interim

## 2018-11-07 NOTE — PATIENT INSTRUCTIONS
Constipation:    Docusate sodium (stool softener) 100 mg AM, 100 mg PM    If doesn't work -- then also add Miralax (laxative) - follow the directions on the box    If doesn't work use Magnesium citrate -- 1/2 of bottle

## 2018-11-08 LAB
ABO GROUP BLD: NORMAL
BLD GP AB SCN SERPL QL: NEGATIVE
PSA SERPL-MCNC: 311.2 NG/ML (ref 0–4)
RH BLD: POSITIVE
SPECIMEN EXPIRATION DATE: NORMAL

## 2018-11-09 ENCOUNTER — APPOINTMENT (OUTPATIENT)
Dept: LAB | Facility: MEDICAL CENTER | Age: 83
End: 2018-11-09
Payer: MEDICARE

## 2018-11-09 ENCOUNTER — HOSPITAL ENCOUNTER (OUTPATIENT)
Dept: INFUSION CENTER | Facility: CLINIC | Age: 83
Discharge: HOME | End: 2018-11-09
Payer: MEDICARE

## 2018-11-09 VITALS
SYSTOLIC BLOOD PRESSURE: 101 MMHG | TEMPERATURE: 97.9 F | HEART RATE: 81 BPM | DIASTOLIC BLOOD PRESSURE: 59 MMHG | RESPIRATION RATE: 18 BRPM

## 2018-11-09 LAB
ABO GROUP BLD: NORMAL
BLD GP AB SCN SERPL QL: NEGATIVE
HEMOCCULT STL QL IA: POSITIVE
RH BLD: POSITIVE
SPECIMEN EXPIRATION DATE: NORMAL

## 2018-11-09 PROCEDURE — G0328 FECAL BLOOD SCRN IMMUNOASSAY: HCPCS | Performed by: PHYSICIAN ASSISTANT

## 2018-11-09 PROCEDURE — 86900 BLOOD TYPING SEROLOGIC ABO: CPT | Performed by: INTERNAL MEDICINE

## 2018-11-09 PROCEDURE — 86923 COMPATIBILITY TEST ELECTRIC: CPT

## 2018-11-09 PROCEDURE — 86850 RBC ANTIBODY SCREEN: CPT | Performed by: INTERNAL MEDICINE

## 2018-11-09 PROCEDURE — 86901 BLOOD TYPING SEROLOGIC RH(D): CPT | Performed by: INTERNAL MEDICINE

## 2018-11-09 PROCEDURE — 36430 TRANSFUSION BLD/BLD COMPNT: CPT

## 2018-11-09 PROCEDURE — P9040 RBC LEUKOREDUCED IRRADIATED: HCPCS

## 2018-11-09 RX ORDER — ACETAMINOPHEN 325 MG/1
650 TABLET ORAL ONCE
Status: COMPLETED | OUTPATIENT
Start: 2018-11-09 | End: 2018-11-09

## 2018-11-09 RX ORDER — SODIUM CHLORIDE 9 MG/ML
20 INJECTION, SOLUTION INTRAVENOUS CONTINUOUS
Status: DISCONTINUED | OUTPATIENT
Start: 2018-11-09 | End: 2018-11-12 | Stop reason: HOSPADM

## 2018-11-09 RX ADMIN — SODIUM CHLORIDE 20 ML/HR: 0.9 INJECTION, SOLUTION INTRAVENOUS at 09:45

## 2018-11-09 RX ADMIN — ACETAMINOPHEN 650 MG: 325 TABLET, FILM COATED ORAL at 09:47

## 2018-11-09 NOTE — PROGRESS NOTES
Patient tolerated his blood transfusion well without adverse reaction  Patient is aware to return on Monday for his second unit of blood

## 2018-11-09 NOTE — PLAN OF CARE
Problem: INFECTION - ADULT  Goal: Absence or prevention of progression during hospitalization  INTERVENTIONS:  - Assess and monitor for signs and symptoms of infection  - Monitor lab/diagnostic results  - Monitor all insertion sites, i e  indwelling lines, tubes, and drains  - Monitor endotracheal (as able) and nasal secretions for changes in amount and color  - [place] appropriate cooling/warming therapies per order  - Administer medications as ordered  - Instruct and encourage patient and family to use good hand hygiene technique  - Identify and instruct in appropriate isolation precautions for identified infection/condition  Outcome: Progressing    Goal: Absence of fever/infection during neutropenic period  INTERVENTIONS:  - Monitor WBC  - Implement neutropenic guidelines  Outcome: Progressing

## 2018-11-10 LAB
ABO GROUP BLD BPU: NORMAL
BPU ID: NORMAL
CROSSMATCH: NORMAL
UNIT DISPENSE STATUS: NORMAL
UNIT PRODUCT CODE: NORMAL
UNIT RH: NORMAL

## 2018-11-11 LAB
ABO GROUP BLD BPU: NORMAL
BPU ID: NORMAL
UNIT DISPENSE STATUS: NORMAL
UNIT PRODUCT CODE: NORMAL
UNIT RH: NORMAL

## 2018-11-12 ENCOUNTER — HOSPITAL ENCOUNTER (OUTPATIENT)
Dept: INFUSION CENTER | Facility: CLINIC | Age: 83
Discharge: HOME | End: 2018-11-12
Payer: MEDICARE

## 2018-11-12 VITALS
RESPIRATION RATE: 16 BRPM | HEART RATE: 88 BPM | DIASTOLIC BLOOD PRESSURE: 66 MMHG | TEMPERATURE: 99.2 F | SYSTOLIC BLOOD PRESSURE: 104 MMHG

## 2018-11-12 PROCEDURE — 36430 TRANSFUSION BLD/BLD COMPNT: CPT

## 2018-11-12 PROCEDURE — P9021 RED BLOOD CELLS UNIT: HCPCS

## 2018-11-12 RX ORDER — ACETAMINOPHEN 325 MG/1
650 TABLET ORAL ONCE
Status: COMPLETED | OUTPATIENT
Start: 2018-11-12 | End: 2018-11-12

## 2018-11-12 RX ADMIN — ACETAMINOPHEN 650 MG: 325 TABLET, FILM COATED ORAL at 12:53

## 2018-11-12 NOTE — PROGRESS NOTES
Pt  Tolerated 1 unit of PRBCs without adverse event  Pt  Has scheduled follow up labs tomorrow; [facility] monitoring

## 2018-11-12 NOTE — PLAN OF CARE
Problem: PAIN - ADULT  Goal: Verbalizes/displays adequate comfort level or baseline comfort level  Interventions:  - Encourage patient to monitor pain and request assistance  - Assess pain using appropriate pain scale  - Administer analgesics based on type and severity of pain and evaluate response  - Implement non-pharmacological measures as appropriate and evaluate response  - Consider cultural and social influences on pain and pain management  - Notify physician/advanced practitioner if interventions unsuccessful or patient reports new pain  Outcome: Progressing      Problem: INFECTION - ADULT  Goal: Absence or prevention of progression during hospitalization  INTERVENTIONS:  - Assess and monitor for signs and symptoms of infection  - Monitor lab/diagnostic results  - Monitor all insertion sites, i e  indwelling lines, tubes, and drains  - Monitor endotracheal (as able) and nasal secretions for changes in amount and color  - [place] appropriate cooling/warming therapies per order  - Administer medications as ordered  - Instruct and encourage patient and family to use good hand hygiene technique  - Identify and instruct in appropriate isolation precautions for identified infection/condition  Outcome: Progressing    Goal: Absence of fever/infection during neutropenic period  INTERVENTIONS:  - Monitor WBC  - Implement neutropenic guidelines  Outcome: Progressing      Problem: Knowledge Deficit  Goal: Patient/family/caregiver demonstrates understanding of disease process, treatment plan, medications, and discharge instructions  Complete learning assessment and assess knowledge base    Interventions:  - Provide teaching at level of understanding  - Provide teaching via preferred learning methods  Outcome: Progressing

## 2018-11-12 NOTE — PROGRESS NOTES
Pt  Denies new symptoms or concerns at this time  Second unit of PRBCs ordered for infusion today for hgb 7 8 with fatigue

## 2018-11-14 ENCOUNTER — OFFICE VISIT (OUTPATIENT)
Dept: HEMATOLOGY ONCOLOGY | Facility: CLINIC | Age: 83
End: 2018-11-14
Payer: MEDICARE

## 2018-11-14 ENCOUNTER — TELEPHONE (OUTPATIENT)
Dept: GASTROENTEROLOGY | Facility: CLINIC | Age: 83
End: 2018-11-14

## 2018-11-14 VITALS
DIASTOLIC BLOOD PRESSURE: 60 MMHG | HEIGHT: 69 IN | WEIGHT: 237.5 LBS | BODY MASS INDEX: 35.18 KG/M2 | OXYGEN SATURATION: 98 % | RESPIRATION RATE: 18 BRPM | SYSTOLIC BLOOD PRESSURE: 110 MMHG | HEART RATE: 86 BPM | TEMPERATURE: 99.7 F

## 2018-11-14 DIAGNOSIS — C61 PROSTATE CANCER (HCC): Primary | ICD-10-CM

## 2018-11-14 DIAGNOSIS — I95.9 HYPOTENSION, UNSPECIFIED HYPOTENSION TYPE: ICD-10-CM

## 2018-11-14 DIAGNOSIS — C79.51 BONE METASTASES (HCC): ICD-10-CM

## 2018-11-14 DIAGNOSIS — D50.9 IRON DEFICIENCY ANEMIA, UNSPECIFIED IRON DEFICIENCY ANEMIA TYPE: ICD-10-CM

## 2018-11-14 DIAGNOSIS — C79.9 METASTATIC DISEASE (HCC): ICD-10-CM

## 2018-11-14 LAB
BASOPHILS # BLD AUTO: 45 CELLS/UL (ref 0–200)
BASOPHILS NFR BLD AUTO: 1 %
BLASTS # BLD: 45 CELLS/UL
BLASTS NFR BLD MANUAL: 1 %
EOSINOPHIL # BLD AUTO: 135 CELLS/UL (ref 15–500)
EOSINOPHIL NFR BLD AUTO: 3 %
ERYTHROCYTE [DISTWIDTH] IN BLOOD BY AUTOMATED COUNT: 16 % (ref 11–15)
HCT VFR BLD AUTO: 29.1 % (ref 38.5–50)
HGB BLD-MCNC: 9.8 G/DL (ref 13.2–17.1)
LYMPHOCYTES # BLD MANUAL: 675 CELLS/UL (ref 850–3900)
LYMPHOCYTES NFR BLD AUTO: 15 %
MCH RBC QN AUTO: 30.6 PG (ref 27–33)
MCHC RBC AUTO-ENTMCNC: 33.7 G/DL (ref 32–36)
MCV RBC AUTO: 90.9 FL (ref 80–100)
METAMYELOCYTES # BLD: 90 CELLS/UL
METAMYELOCYTES NFR BLD MANUAL: 2 %
MONOCYTES # BLD AUTO: 270 CELLS/UL (ref 200–950)
MONOCYTES NFR BLD AUTO: 6 %
MYELOCYTES # BLD: 90 CELLS/UL
MYELOCYTES NFR BLD MANUAL: 2 %
NEUTROPHILS # BLD AUTO: 2745 CELLS/UL (ref 1500–7800)
NEUTROPHILS NFR BLD AUTO: 61 %
NEUTS BAND # BLD: 315 CELLS/UL (ref 0–750)
NEUTS BAND NFR BLD MANUAL: 7 %
NRBC # BLD: 225 CELLS/UL
NRBC BLD-RTO: 5 /100 WBC
PLATELET # BLD AUTO: 36 THOUSAND/UL (ref 140–400)
PMV BLD REES-ECKER: 9.8 FL (ref 7.5–12.5)
PROMYELOCYTES # BLD: 90 CELLS/UL
PROMYELOCYTES NFR BLD MANUAL: 2 %
RBC # BLD AUTO: 3.2 MILLION/UL (ref 4.2–5.8)
WBC # BLD AUTO: 4.5 THOUSAND/UL (ref 3.8–10.8)

## 2018-11-14 PROCEDURE — 99215 OFFICE O/P EST HI 40 MIN: CPT | Performed by: PHYSICIAN ASSISTANT

## 2018-11-14 NOTE — LETTER
November 14, 2018     Shun Villanueva MD  [address] [place] [address]    Patient: Adam Garrison   YOB: 1935   Date of Visit: 11/14/2018       Dear Dr Emma Monroe Recipients: Thank you for referring Tyshawn Sanchez to me for evaluation  Below are my notes for this consultation  If you have questions, please do not hesitate to call me  I look forward to following your patient along with you  Sincerely,        Talita Balbuena PA-C        CC: No Recipients  Talita Balbuena PA-C  11/14/2018  4:06 PM  Sign at close encounter  Hematology/Oncology Outpatient Follow-up  Adam Garrison 81 y o  male 1935 [id number]    Date:  11/14/2018      Assessment and Plan:  1  Prostate cancer ([facility] [address] ), 2  Bone metastases ([facility] [address] ), 3  Metastatic disease ([facility] [address] )  81year old male with 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  Patient was most recently treated with Xofigo  He also continues on Lupron and Casodex  He was unable to receive cycle 5 and 6 of Xofigo due to side effects  Patient presented to our office last week due to progressive fatigue  He had been taking NSAIDs due to not feeling well, thinking that this would help his general malaise  He was taking 2 tablets of ibuprofen 3 times per day  Likely this caused a ulcer as patient was not eating at this time either  He had heme-positive stool  He will see Dr Markos Barrios tomorrow and have an EGD next week  Patient had blood transfusion 1 unit on 11/09 and 11/12  He responded well with blood transfusions  Also, CBC shows evidence of bone marrow infiltration of prostate cancer  Platelet count is decreasing at 36,000  Nucleated red blood cells as well as   blast cells are present  Discussed that bone marrow biopsy would definitively tell us this however CBC with differential also indicates that this is infiltration of prostate cancer into the bone marrow    Patient did not want to proceed with bone marrow biopsy  Due to platelet count being 36,000, he will require platelet transfusion prior to EGD  We will coordinate this after EGD is scheduled  Patient will continue to have CBC weekly, possibly twice weekly  We will decide this next week  Due to progressive prostate cancer patient is recommended to have change in treatment  We recommended Xtandi 160 mg p o  daily  He is in agreement with this  Reviewed side effects include but are not limited to fatigue, hot flashes, constipation, muscle/joint aches/pains, diarrhea, decrease in appetite, breast tenderness, osteoporosis, seizure  Patient does not have history of seizure  - CBC and differential; Standing  - Enzalutamide (XTANDI) 40 mg CAPS; Take 4 capsules (160 mg total) by mouth daily  Dispense: 120 capsule; Refill: 0  - CBC and differential    4  Iron deficiency anemia, unspecified iron deficiency anemia type  See above     - CBC and differential; Standing  - CBC and differential    5  Hypotension, unspecified hypotension type  Patient's wife states that patient's blood pressure has been lower  Patient's wife will be conferring with patient's primary care doctor  HPI:  56-year-old male with history of stage IV prostate cancer  In 2011 patient had IMRT in [facility] for cancer of the prostate  He started on Lupron in April 2017  He responded with this  He presented to our office in August 2017 for castrate resistant prostate cancer, stage IV, with bone metastases  He was recommended to take Zytiga or Raciel Hires however he did not want to do this      PSA was coming down      He was seen in our office in May 2018 that time PSA increased from 5-11 4  Once again it was discussed for patient to have additional treatment      He had a CT of the chest abdomen pelvis on 05/22/2018  This showed multiple new an extensive sclerotic metastases within the axial in appendicular skeleton  No evidence of nodular or other soft tissue metastatic disease    Bone scan at the same time also showed interval worsening/progression of diffuse bony metastases      PSA increased to 22 3 in June 2018      Patient began treatment with Josr Juan in June 2018  Patient was only able to complete 4 cycles due to poor tolerability of side effects  His last treatment was on 09/06/2018  Interval history:    BP at home has been 107/70    ROS: Review of Systems   Constitutional: Positive for appetite change (improved ) and fatigue (improved)  Respiratory: Negative for cough and shortness of breath  Cardiovascular: Negative for chest pain, palpitations and leg swelling  Gastrointestinal: Positive for nausea (continues to take Zofran TID)  Negative for abdominal pain, constipation, diarrhea and vomiting  Denies epigastric pain or GERD symptoms      Genitourinary: Negative for difficulty urinating, dysuria and hematuria  Musculoskeletal: Negative for arthralgias and back pain  Skin: Negative  Neurological: Positive for light-headedness (occassional)  Negative for dizziness, weakness and headaches  Hematological: Negative  Psychiatric/Behavioral: Negative  Past Medical History:   Diagnosis Date    Acute bronchitis     Hypertension     Prostate CA ([address] )     Urinary frequency     UTI (urinary tract infection)        Past Surgical History:   Procedure Laterality Date    CHOLECYSTECTOMY      COLONOSCOPY      [id number] ([place])       Social History     Social History    Marital status: /Civil Union     Spouse name: N/A    Number of children: N/A    Years of education: N/A     Social History Main Topics    Smoking status: Former Smoker     Quit date: 1977    Smokeless tobacco: Never Used    Alcohol use Yes      Comment: 1/day   Per [employer]scripts: Social    Drug use: No    Sexual activity: Not on file     Other Topics Concern    Not on file     Social History Narrative    No narrative on file       Family History   Problem Relation Age of Onset    Breast cancer Mother     Prostate cancer Father     Prostate cancer Brother     Stroke Family         CVA    Hypertension Family        No Known Allergies      Current Outpatient Prescriptions:     aspirin (ECOTRIN LOW STRENGTH) 81 mg EC tablet, Take 81 mg by mouth daily, Disp: , Rfl:     bicalutamide (CASODEX) 50 mg tablet, Take 1 tablet (50 mg total) by mouth daily, Disp: 90 tablet, Rfl: 3    brimonidine (ALPHAGAN P) 0 1 %, INSTILL 1 DROP TWICE A DAY INTO BOTH EYES, Disp: , Rfl:     brimonidine-timolol (COMBIGAN) 0 2-0 5 %, Administer 1 drop to both eyes every 12 (twelve) hours, Disp: , Rfl:     cholecalciferol (VITAMIN D3) 1,000 units tablet, Take 1,000 Units by mouth daily, Disp: , Rfl:     dorzolamide (TRUSOPT) 2 % ophthalmic solution, 1 drop 3 (three) times a day, Disp: , Rfl:     lisinopril (ZESTRIL) 20 mg tablet, Take 10 mg by mouth daily  , Disp: , Rfl:     metoprolol tartrate (LOPRESSOR) 25 mg tablet, Take 25 mg by mouth every 12 (twelve) hours, Disp: , Rfl:     Multiple Vitamins-Minerals (PRESERVISION/LUTEIN) CAPS, Take by mouth daily, Disp: , Rfl:     multivitamin (THERAGRAN) TABS, Take 1 tablet by mouth daily, Disp: , Rfl:     ondansetron (ZOFRAN) 8 mg tablet, Take 1 tablet (8 mg total) by mouth every 8 (eight) hours as needed for nausea or vomiting, Disp: 30 tablet, Rfl: 0    travoprost (TRAVATAN Z) 0 004 % ophthalmic solution, 1 drop daily at bedtime, Disp: , Rfl:     Vitamins C E (VITAMIN C/VITAMIN E PO), Take by mouth, Disp: , Rfl:       Physical Exam:  /60   Pulse 86   Temp 99 7 °F (37 6 °C) (Tympanic)   Resp 18   Ht 5' 9" (1 753 m)   Wt 108 kg (237 lb 8 oz)   SpO2 98%   BMI 35 07 kg/m²      Physical Exam   Constitutional: He is oriented to person, place, and time  He appears well-developed and well-nourished  No distress  HENT:   Head: Normocephalic and atraumatic  Eyes: Conjunctivae are normal    Neck: Normal range of motion  Neck supple     Cardiovascular: Normal rate, regular rhythm and normal heart sounds  No murmur heard  Pulmonary/Chest: Effort normal and breath sounds normal  No respiratory distress  Abdominal: Soft  There is no tenderness  Musculoskeletal: Normal range of motion  He exhibits no edema or tenderness  Lymphadenopathy:     He has no cervical adenopathy  Neurological: He is alert and oriented to person, place, and time  No cranial nerve deficit  Skin: Skin is warm and dry  There is pallor (improved since last week )  Psychiatric: He has a normal mood and affect  Vitals reviewed  Labs:  Lab Results   Component Value Date    WBC 4 5 11/13/2018    HGB 9 8 (L) 11/13/2018    HCT 29 1 (L) 11/13/2018    MCV 90 9 11/13/2018    PLT 36 (L) 11/13/2018         Patient voiced understanding and agreement in the above discussion  Aware to contact our office with questions/symptoms in the interim  Faizan Maldonado  11/14/2018 12:50 PM  Incomplete  Hematology/Oncology Outpatient Follow-up  Nanette Huerta 81 y o  male 1935 [id number]    Date:  11/14/2018      Assessment and Plan:        HPI:      Interval history:    ROS: Review of Systems    Past Medical History:   Diagnosis Date    Acute bronchitis     Hypertension     Prostate CA ([address] )     Urinary frequency     UTI (urinary tract infection)        Past Surgical History:   Procedure Laterality Date    CHOLECYSTECTOMY      COLONOSCOPY      [id number] ([place])       Social History     Social History    Marital status: /Civil Union     Spouse name: N/A    Number of children: N/A    Years of education: N/A     Social History Main Topics    Smoking status: Former Smoker     Quit date: 1977    Smokeless tobacco: Never Used    Alcohol use Yes      Comment: 1/day   Per Allscripts: Social    Drug use: No    Sexual activity: Not on file     Other Topics Concern    Not on file     Social History Narrative    No narrative on file       Family History   Problem Relation Age of Onset    Breast cancer Mother     Prostate cancer Father     Prostate cancer Brother     Stroke Family         CVA    Hypertension Family        No Known Allergies      Current Outpatient Prescriptions:     aspirin (ECOTRIN LOW STRENGTH) 81 mg EC tablet, Take 81 mg by mouth daily, Disp: , Rfl:     bicalutamide (CASODEX) 50 mg tablet, Take 1 tablet (50 mg total) by mouth daily, Disp: 90 tablet, Rfl: 3    brimonidine (ALPHAGAN P) 0 1 %, INSTILL 1 DROP TWICE A DAY INTO BOTH EYES, Disp: , Rfl:     brimonidine-timolol (COMBIGAN) 0 2-0 5 %, Administer 1 drop to both eyes every 12 (twelve) hours, Disp: , Rfl:     cholecalciferol (VITAMIN D3) 1,000 units tablet, Take 1,000 Units by mouth daily, Disp: , Rfl:     dorzolamide (TRUSOPT) 2 % ophthalmic solution, 1 drop 3 (three) times a day, Disp: , Rfl:     lisinopril (ZESTRIL) 20 mg tablet, Take 10 mg by mouth daily  , Disp: , Rfl:     metoprolol tartrate (LOPRESSOR) 25 mg tablet, Take 25 mg by mouth every 12 (twelve) hours, Disp: , Rfl:     Multiple Vitamins-Minerals (PRESERVISION/LUTEIN) CAPS, Take by mouth daily, Disp: , Rfl:     multivitamin (THERAGRAN) TABS, Take 1 tablet by mouth daily, Disp: , Rfl:     ondansetron (ZOFRAN) 8 mg tablet, Take 1 tablet (8 mg total) by mouth every 8 (eight) hours as needed for nausea or vomiting, Disp: 30 tablet, Rfl: 0    travoprost (TRAVATAN Z) 0 004 % ophthalmic solution, 1 drop daily at bedtime, Disp: , Rfl:     Vitamins C E (VITAMIN C/VITAMIN E PO), Take by mouth, Disp: , Rfl:       Physical Exam:  There were no vitals taken for this visit      Physical Exam      Labs:  Lab Results   Component Value Date    WBC 4 5 11/13/2018    HGB 9 8 (L) 11/13/2018    HCT 29 1 (L) 11/13/2018    MCV 90 9 11/13/2018    PLT 36 (L) 11/13/2018     Lab Results   Component Value Date     12/27/2017    K 5 0 11/07/2018     11/07/2018    CO2 27 11/07/2018    BUN 19 11/07/2018    CREATININE 0 84 11/07/2018    CALCIUM 8 6 11/07/2018    AST 45 11/07/2018    ALT 35 11/07/2018    ALKPHOS 485 (H) 11/07/2018    PROT 7 0 12/27/2017    BILITOT 0 5 12/27/2017    EGFR 82 11/07/2018       Patient voiced understanding and agreement in the above discussion  Aware to contact our office with questions/symptoms in the interim

## 2018-11-14 NOTE — PROGRESS NOTES
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  Patient was most recently treated with Xofigo  He also continues on Lupron and Casodex  He was unable to receive cycle 5 and 6 of Xofigo due to side effects  Patient presented to our office last week due to progressive fatigue  He had been taking NSAIDs due to not feeling well, thinking that this would help his general malaise  He was taking 2 tablets of ibuprofen 3 times per day  Likely this caused a ulcer as patient was not eating at this time either  He had heme-positive stool  He will see Dr Oscar Tariq tomorrow and have an EGD next week  Patient had blood transfusion 1 unit on 11/09 and 11/12  He responded well with blood transfusions  Also, CBC shows evidence of bone marrow infiltration of prostate cancer  Platelet count is decreasing at 36,000  Nucleated red blood cells as well as   blast cells are present  Discussed that bone marrow biopsy would definitively tell us this however CBC with differential also indicates that this is infiltration of prostate cancer into the bone marrow  Patient did not want to proceed with bone marrow biopsy  Due to platelet count being 36,000, he will require platelet transfusion prior to EGD  We will coordinate this after EGD is scheduled  Patient will continue to have CBC weekly, possibly twice weekly  We will decide this next week  Due to progressive prostate cancer patient is recommended to have change in treatment  We recommended Xtandi 160 mg p o  daily  He is in agreement with this    Reviewed side effects include but are not limited to fatigue, hot flashes, constipation, muscle/joint aches/pains, diarrhea, decrease in appetite, breast tenderness, osteoporosis, seizure  Patient does not have history of seizure  Dr Tammi Yeboah reviewed this with the patient as well  - CBC and differential; Standing  - Enzalutamide (XTANDI) 40 mg CAPS; Take 4 capsules (160 mg total) by mouth daily  Dispense: 120 capsule; Refill: 0  - CBC and differential    4  Iron deficiency anemia, unspecified iron deficiency anemia type  See above     - CBC and differential; Standing  - CBC and differential    5  Hypotension, unspecified hypotension type  Patient's wife states that patient's blood pressure has been lower  Patient's wife will be conferring with patient's primary care doctor  HPI:  52-year-old male with history of stage IV prostate cancer  In 2011 patient had IMRT in [facility] for cancer of the prostate  He started on Lupron in April 2017  He responded with this  He presented to our office in August 2017 for castrate resistant prostate cancer, stage IV, with bone metastases  He was recommended to take Zytiga or [place] however he did not want to do this      PSA was coming down      He was seen in our office in May 2018 that time PSA increased from 5-11 4  Once again it was discussed for patient to have additional treatment      He had a CT of the chest abdomen pelvis on 05/22/2018  This showed multiple new an extensive sclerotic metastases within the axial in appendicular skeleton  No evidence of nodular or other soft tissue metastatic disease  Bone scan at the same time also showed interval worsening/progression of diffuse bony metastases      PSA increased to 22 3 in June 2018      Patient began treatment with [place] Plate in June 2018  Patient was only able to complete 4 cycles due to poor tolerability of side effects  His last treatment was on 09/06/2018  Interval history:    BP at home has been 107/70    ROS: Review of Systems   Constitutional: Positive for appetite change (improved ) and fatigue (improved)     Respiratory: Negative for cough and shortness of breath  Cardiovascular: Negative for chest pain, palpitations and leg swelling  Gastrointestinal: Positive for nausea (continues to take Zofran TID)  Negative for abdominal pain, constipation, diarrhea and vomiting  Denies epigastric pain or GERD symptoms      Genitourinary: Negative for difficulty urinating, dysuria and hematuria  Musculoskeletal: Negative for arthralgias and back pain  Skin: Negative  Neurological: Positive for light-headedness (occassional)  Negative for dizziness, weakness and headaches  Hematological: Negative  Psychiatric/Behavioral: Negative  Past Medical History:   Diagnosis Date    Acute bronchitis     Hypertension     Prostate CA ([address] )     Urinary frequency     UTI (urinary tract infection)        Past Surgical History:   Procedure Laterality Date    CHOLECYSTECTOMY      COLONOSCOPY      [id number] ([place])       Social History     Social History    Marital status: /Civil Union     Spouse name: N/A    Number of children: N/A    Years of education: N/A     Social History Main Topics    Smoking status: Former Smoker     Quit date: 1977    Smokeless tobacco: Never Used    Alcohol use Yes      Comment: 1/day   Per Allscripts: Social    Drug use: No    Sexual activity: Not on file     Other Topics Concern    Not on file     Social History Narrative    No narrative on file       Family History   Problem Relation Age of Onset    Breast cancer Mother     Prostate cancer Father     Prostate cancer Brother     Stroke Family         CVA    Hypertension Family        No Known Allergies      Current Outpatient Prescriptions:     aspirin (ECOTRIN LOW STRENGTH) 81 mg EC tablet, Take 81 mg by mouth daily, Disp: , Rfl:     bicalutamide (CASODEX) 50 mg tablet, Take 1 tablet (50 mg total) by mouth daily, Disp: 90 tablet, Rfl: 3    brimonidine (ALPHAGAN P) 0 1 %, INSTILL 1 DROP TWICE A DAY INTO BOTH EYES, Disp: , Rfl:     brimonidine-timolol (COMBIGAN) 0 2-0 5 %, Administer 1 drop to both eyes every 12 (twelve) hours, Disp: , Rfl:     cholecalciferol (VITAMIN D3) 1,000 units tablet, Take 1,000 Units by mouth daily, Disp: , Rfl:     dorzolamide (TRUSOPT) 2 % ophthalmic solution, 1 drop 3 (three) times a day, Disp: , Rfl:     lisinopril (ZESTRIL) 20 mg tablet, Take 10 mg by mouth daily  , Disp: , Rfl:     metoprolol tartrate (LOPRESSOR) 25 mg tablet, Take 25 mg by mouth every 12 (twelve) hours, Disp: , Rfl:     Multiple Vitamins-Minerals (PRESERVISION/LUTEIN) CAPS, Take by mouth daily, Disp: , Rfl:     multivitamin (THERAGRAN) TABS, Take 1 tablet by mouth daily, Disp: , Rfl:     ondansetron (ZOFRAN) 8 mg tablet, Take 1 tablet (8 mg total) by mouth every 8 (eight) hours as needed for nausea or vomiting, Disp: 30 tablet, Rfl: 0    travoprost (TRAVATAN Z) 0 004 % ophthalmic solution, 1 drop daily at bedtime, Disp: , Rfl:     Vitamins C E (VITAMIN C/VITAMIN E PO), Take by mouth, Disp: , Rfl:       Physical Exam:  /60   Pulse 86   Temp 99 7 °F (37 6 °C) (Tympanic)   Resp 18   Ht 5' 9" (1 753 m)   Wt 108 kg (237 lb 8 oz)   SpO2 98%   BMI 35 07 kg/m²     Physical Exam   Constitutional: He is oriented to person, place, and time  He appears well-developed and well-nourished  No distress  HENT:   Head: Normocephalic and atraumatic  Eyes: Conjunctivae are normal    Neck: Normal range of motion  Neck supple  Cardiovascular: Normal rate, regular rhythm and normal heart sounds  No murmur heard  Pulmonary/Chest: Effort normal and breath sounds normal  No respiratory distress  Abdominal: Soft  There is no tenderness  Musculoskeletal: Normal range of motion  He exhibits no edema or tenderness  Lymphadenopathy:     He has no cervical adenopathy  Neurological: He is alert and oriented to person, place, and time  No cranial nerve deficit  Skin: Skin is warm and dry  There is pallor (improved since last week )     Psychiatric: He has a normal mood and affect  Vitals reviewed  Labs:  Lab Results   Component Value Date    WBC 4 5 11/13/2018    HGB 9 8 (L) 11/13/2018    HCT 29 1 (L) 11/13/2018    MCV 90 9 11/13/2018    PLT 36 (L) 11/13/2018         Patient voiced understanding and agreement in the above discussion  Aware to contact our office with questions/symptoms in the interim

## 2018-11-15 ENCOUNTER — TELEPHONE (OUTPATIENT)
Dept: GASTROENTEROLOGY | Facility: CLINIC | Age: 83
End: 2018-11-15

## 2018-11-15 ENCOUNTER — DOCUMENTATION (OUTPATIENT)
Dept: HEMATOLOGY ONCOLOGY | Facility: CLINIC | Age: 83
End: 2018-11-15

## 2018-11-15 NOTE — TELEPHONE ENCOUNTER
----- Message from Elinor Blanc MA sent at 11/14/2018  4:12 PM EST -----  Hello! Can you please add patient on for a colon for GI bleed with Dr Ale Paul at [facility] [place] [facility]   Serafin Milian and Dr Remington Lyn already agreed, he will be doing it at 10am

## 2018-11-15 NOTE — TELEPHONE ENCOUNTER
Spoke to patient, he does not want to schedule the colonoscopy or even an office visit at this time, he  States the bleeding has stopped  I gave him our office number if he chooses to make an appointment

## 2018-11-15 NOTE — PROGRESS NOTES
2018   Received notification from clinical pt will be starting Xtandi  Pt has [employer]  ID [id number]  BIN # [id number]  PCN [id number]  GRP #[id number]    Submitted for auth through cover my meds  11/15/2018   Received approval letter from [employer]  auth # [id number] is valid from 2018 through 2019  Notified clinical, [employer], and financial   Per [employer], this will need to be sent to [employer]  They will do so after funding is obtained       7/10/2019  Received notification from financial:    For Patient Scott Terry [birth year] (Richi-Please have patients  updated to reflect correct )    Patient enrolled in the [facility] for [address] $ 7500 00    ID# [id number]  GRP# [id number]  BIN# [id number]  PCN# [id number]  EFF: [id number]  THRU:  20    [employer] and clinical were also notified

## 2018-11-23 DIAGNOSIS — R11.0 NAUSEA IN ADULT: ICD-10-CM

## 2018-11-24 RX ORDER — ONDANSETRON HYDROCHLORIDE 8 MG/1
TABLET, FILM COATED ORAL
Qty: 30 TABLET | Refills: 0 | Status: SHIPPED | OUTPATIENT
Start: 2018-11-24 | End: 2019-01-01

## 2018-11-26 ENCOUNTER — DOCUMENTATION (OUTPATIENT)
Dept: HEMATOLOGY ONCOLOGY | Facility: CLINIC | Age: 83
End: 2018-11-26

## 2018-11-26 NOTE — PROGRESS NOTES
11/26/2018 received notification from cover my meds that [address] [place] [address] started an authorization for the Ondansetron 8 mg tablets  Completed the auth request and submitted for auth  Patient has Elease Buerger #[id number]    Received approval from [employer]  Broderick Jha #[id number] is valid from 11/26/2018 through 11/26/2019  [address] [place] [address] at 2:12 ([phone]) spoke with Amita Dailey informed her that we have obtained the auth  She stated that the pt's wife picked it up already since he needed it  Confirmed with Amita Dailey that there should not be a problem filling this in the future is the pt needs it since the auth is valid until 11/26/2019      Notified clinical

## 2018-11-27 LAB
BASOPHILS # BLD AUTO: 0 CELLS/UL (ref 0–200)
BASOPHILS NFR BLD AUTO: 0 %
EOSINOPHIL # BLD AUTO: 0 CELLS/UL (ref 15–500)
EOSINOPHIL NFR BLD AUTO: 0 %
ERYTHROCYTE [DISTWIDTH] IN BLOOD BY AUTOMATED COUNT: 15.8 % (ref 11–15)
HCT VFR BLD AUTO: 26.3 % (ref 38.5–50)
HGB BLD-MCNC: 8.9 G/DL (ref 13.2–17.1)
LYMPHOCYTES # BLD MANUAL: 602 CELLS/UL (ref 850–3900)
LYMPHOCYTES NFR BLD AUTO: 14 %
MCH RBC QN AUTO: 31.2 PG (ref 27–33)
MCHC RBC AUTO-ENTMCNC: 33.8 G/DL (ref 32–36)
MCV RBC AUTO: 92.3 FL (ref 80–100)
METAMYELOCYTES # BLD: 43 CELLS/UL
METAMYELOCYTES NFR BLD MANUAL: 1 %
MONOCYTES # BLD AUTO: 172 CELLS/UL (ref 200–950)
MONOCYTES NFR BLD AUTO: 4 %
MYELOCYTES # BLD: 43 CELLS/UL
MYELOCYTES NFR BLD MANUAL: 1 %
NEUTROPHILS # BLD AUTO: 3182 CELLS/UL (ref 1500–7800)
NEUTROPHILS NFR BLD AUTO: 74 %
NEUTS BAND # BLD: 258 CELLS/UL (ref 0–750)
NEUTS BAND NFR BLD MANUAL: 6 %
NRBC # BLD: 215 CELLS/UL
NRBC BLD-RTO: 5 /100 WBC
PLATELET # BLD AUTO: 53 THOUSAND/UL (ref 140–400)
PMV BLD REES-ECKER: 11.1 FL (ref 7.5–12.5)
RBC # BLD AUTO: 2.85 MILLION/UL (ref 4.2–5.8)
WBC # BLD AUTO: 4.3 THOUSAND/UL (ref 3.8–10.8)

## 2018-11-28 ENCOUNTER — OFFICE VISIT (OUTPATIENT)
Dept: HEMATOLOGY ONCOLOGY | Facility: CLINIC | Age: 83
End: 2018-11-28
Payer: MEDICARE

## 2018-11-28 VITALS
DIASTOLIC BLOOD PRESSURE: 60 MMHG | OXYGEN SATURATION: 96 % | WEIGHT: 233.6 LBS | TEMPERATURE: 99.5 F | BODY MASS INDEX: 34.6 KG/M2 | HEIGHT: 69 IN | RESPIRATION RATE: 20 BRPM | SYSTOLIC BLOOD PRESSURE: 110 MMHG | HEART RATE: 96 BPM

## 2018-11-28 DIAGNOSIS — D50.8 OTHER IRON DEFICIENCY ANEMIA: ICD-10-CM

## 2018-11-28 DIAGNOSIS — C79.51 BONE METASTASES (HCC): ICD-10-CM

## 2018-11-28 DIAGNOSIS — C79.9 METASTATIC DISEASE (HCC): ICD-10-CM

## 2018-11-28 DIAGNOSIS — C61 PROSTATE CANCER (HCC): Primary | ICD-10-CM

## 2018-11-28 DIAGNOSIS — D69.6 THROMBOCYTOPENIA (HCC): ICD-10-CM

## 2018-11-28 PROBLEM — D50.9 IRON DEFICIENCY ANEMIA: Status: ACTIVE | Noted: 2018-11-28

## 2018-11-28 PROCEDURE — 99214 OFFICE O/P EST MOD 30 MIN: CPT | Performed by: PHYSICIAN ASSISTANT

## 2018-11-28 NOTE — PROGRESS NOTES
Hematology/Oncology Outpatient Follow-up  Daryl Lott 81 y o  male 1935 [id number]    Date:  11/28/2018      Assessment and Plan:  1  Prostate cancer ([facility] [address] ), 2  Metastatic disease ([facility] [address] ), 3  Bone metastases [facility])  26-year-old male presents for follow-up regarding prostate cancer  Patient's CBC is evidence of prostate cancer in the bone marrow  Patient was seen 2 weeks ago and signed consent for Xtandi  This is still pending from [facility]  I spoke with financial counselor from our office today and she states that hopefully we will have an answer by this Friday, 11/30/18  We reviewed the patient is to take all 4 tablets at the same time  He is to take this around the same time each day  He is to call if he has any side effects after beginning this medication     - PSA Total, Diagnostic; Future  - Comprehensive metabolic panel  - Comprehensive metabolic panel    4  Other iron deficiency anemia  Patient had worsening anemia secondary to blood loss from likely gastric ulcers secondary to excessive NSAID use over a few week period of time  He was advised to see GI but he did not wish to do this  He is no longer having black stools  He denies symptoms of abdominal bloating, burping, burning feeling  Will continue to monitor at this time  5  Thrombocytopenia ([facility])  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  Will continue to monitor CBC every 2 weeks  Follow-up in 1 month  HPI:  26-year-old male with history of stage IV prostate cancer  In 2011 patient had IMRT in [facility] for cancer of the prostate  He started on Lupron in April 2017  [facility] responded with this  He presented to our office in August 2017 for castrate resistant prostate cancer, stage IV, with bone metastases    He was recommended to take Zytiga or [place] he did not want to do this      PSA was coming down      He was seen in our office in May 2018 that time PSA increased from 5-11  4   Once again it was discussed for patient to have additional treatment      He had a CT of the chest abdomen pelvis on 05/22/2018   This showed multiple new an extensive sclerotic metastases within the axial in appendicular skeleton   No evidence of nodular or other soft tissue metastatic disease   Bone scan at the same time also showed interval worsening/progression of diffuse bony metastases      PSA increased to 22 3 in June 2018      Patient began treatment with [place] in June 2018  Nithya Epstein was only able to complete 4 cycles due to poor tolerability of side effects   His last treatment was on 09/06/2018  He presented to our office in November 2018 due to worsening fatigue, nausea, worsening anemia  I will also test was negative  Likely he had bleeding in gastric area secondary to excessive NSAID use  His black stools have now resolved  His hemoglobin has remained relatively stable  He still does continue to have some nausea  He continues to take Zofran 3 times per day  Due to positive blood in stool patient was advised to have an EGD to assess ulcer  However, he did not want to proceed with this  He is no longer having black stools  ROS: Review of Systems   Constitutional: Positive for appetite change (still decreased but improved ) and fatigue (improved )  Negative for chills, fever and unexpected weight change  Respiratory: Negative for cough and shortness of breath  Cardiovascular: Negative for chest pain, palpitations and leg swelling  Gastrointestinal: Positive for nausea (getting better )  Negative for abdominal pain, blood in stool, constipation, diarrhea and vomiting  Endocrine:        Hot flashes, occasional    Genitourinary: Negative for difficulty urinating, dysuria and hematuria  Musculoskeletal: Negative for arthralgias and back pain  Skin: Negative  Neurological: Negative for dizziness, weakness, light-headedness, numbness and headaches  Hematological: Negative  Psychiatric/Behavioral: Negative  Past Medical History:   Diagnosis Date    Acute bronchitis     Hypertension     Prostate CA ([address] )     Urinary frequency     UTI (urinary tract infection)        Past Surgical History:   Procedure Laterality Date    CHOLECYSTECTOMY      COLONOSCOPY      [id number] ([place])       Social History     Social History    Marital status: /Civil Union     Spouse name: N/A    Number of children: N/A    Years of education: N/A     Social History Main Topics    Smoking status: Former Smoker     Quit date: 1977    Smokeless tobacco: Never Used    Alcohol use Yes      Comment: 1/day   Per Allscripts: Social    Drug use: No    Sexual activity: Not on file     Other Topics Concern    Not on file     Social History Narrative    No narrative on file       Family History   Problem Relation Age of Onset    Breast cancer Mother     Prostate cancer Father     Prostate cancer Brother     Stroke Family         CVA    Hypertension Family        No Known Allergies      Current Outpatient Prescriptions:     aspirin (ECOTRIN LOW STRENGTH) 81 mg EC tablet, Take 81 mg by mouth daily, Disp: , Rfl:     bicalutamide (CASODEX) 50 mg tablet, Take 1 tablet (50 mg total) by mouth daily, Disp: 90 tablet, Rfl: 3    brimonidine (ALPHAGAN P) 0 1 %, INSTILL 1 DROP TWICE A DAY INTO BOTH EYES, Disp: , Rfl:     brimonidine-timolol (COMBIGAN) 0 2-0 5 %, Administer 1 drop to both eyes every 12 (twelve) hours, Disp: , Rfl:     cholecalciferol (VITAMIN D3) 1,000 units tablet, Take 1,000 Units by mouth daily, Disp: , Rfl:     dorzolamide (TRUSOPT) 2 % ophthalmic solution, 1 drop 3 (three) times a day, Disp: , Rfl:     Enzalutamide (XTANDI) 40 mg CAPS, Take 4 capsules (160 mg total) by mouth daily, Disp: 120 capsule, Rfl: 0    lisinopril (ZESTRIL) 20 mg tablet, Take 10 mg by mouth daily  , Disp: , Rfl:     metoprolol tartrate (LOPRESSOR) 25 mg tablet, Take 25 mg by mouth every 12 (twelve) hours, Disp: , Rfl:     Multiple Vitamins-Minerals (PRESERVISION/LUTEIN) CAPS, Take by mouth daily, Disp: , Rfl:     multivitamin (THERAGRAN) TABS, Take 1 tablet by mouth daily, Disp: , Rfl:     ondansetron (ZOFRAN) 8 mg tablet, TAKE ONE TABLET BY MOUTH EVERY EIGHT HOURS AS NEEDED NAUSEA AND VOMITING , Disp: 30 tablet, Rfl: 0    travoprost (TRAVATAN Z) 0 004 % ophthalmic solution, 1 drop daily at bedtime, Disp: , Rfl:     Vitamins C E (VITAMIN C/VITAMIN E PO), Take by mouth, Disp: , Rfl:       Physical Exam:  /60   Pulse 96   Temp 99 5 °F (37 5 °C) (Tympanic)   Resp 20   Ht 5' 9" (1 753 m)   Wt 106 kg (233 lb 9 6 oz)   SpO2 96%   BMI 34 50 kg/m²     Physical Exam   Constitutional: He is oriented to person, place, and time  He appears well-developed and well-nourished  No distress  HENT:   Head: Normocephalic and atraumatic  Eyes: Conjunctivae are normal  No scleral icterus  Neck: Normal range of motion  Neck supple  Cardiovascular: Normal rate, regular rhythm and normal heart sounds  No murmur heard  Pulmonary/Chest: Effort normal and breath sounds normal  No respiratory distress  Abdominal: Soft  There is no tenderness  Musculoskeletal: Normal range of motion  He exhibits no edema or tenderness  Neurological: He is alert and oriented to person, place, and time  No cranial nerve deficit  Skin: Skin is warm and dry  Psychiatric: He has a normal mood and affect  Vitals reviewed  Labs:  Lab Results   Component Value Date    WBC 4 3 11/26/2018    HGB 8 9 (L) 11/26/2018    HCT 26 3 (L) 11/26/2018    MCV 92 3 11/26/2018    PLT 53 (L) 11/26/2018         Patient voiced understanding and agreement in the above discussion  Aware to contact our office with questions/symptoms in the interim

## 2018-12-04 ENCOUNTER — DOCUMENTATION (OUTPATIENT)
Dept: HEMATOLOGY ONCOLOGY | Facility: CLINIC | Age: 83
End: 2018-12-04

## 2018-12-04 NOTE — PROGRESS NOTES
Patient approved thru [facility] Medication  ID# [id number]  VALID 11-30-18  [address] [address] [address] [id number]    Renewal application received and forwarded to provider for signature

## 2018-12-26 NOTE — PROGRESS NOTES
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  Patient gets once every 6 month hormone injection from his urologist   He has been on Xgeva with calcium and vitamin-D  Earlier this month, December 2018 he was started on Pax that he has been tolerating without too much problem  Abhishek Oseguera He is not on Casodex anymore   He feels tired and some of the tiredness could be secondary to anemia  Patient has required  transfusions  No bone pains  Minimal exertional dyspnea  He has tiredness and arthritic symptoms  History of GI blood loss likely secondary to gastric ulcers secondary to excessive NSAID use over a few week period of time  He was advised to see GI but he did not  He is no longer having black stools  He denies symptoms of abdominal bloating, burping, burning feeling  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        Current Outpatient Prescriptions:     aspirin (ECOTRIN LOW STRENGTH) 81 mg EC tablet, Take 81 mg by mouth daily, Disp: , Rfl:     bicalutamide (CASODEX) 50 mg tablet, Take 1 tablet (50 mg total) by mouth daily, Disp: 90 tablet, Rfl: 3    brimonidine (ALPHAGAN P) 0 1 %, INSTILL 1 DROP TWICE A DAY INTO BOTH EYES, Disp: , Rfl:     brimonidine-timolol (COMBIGAN) 0 2-0 5 %, Administer 1 drop to both eyes every 12 (twelve) hours, Disp: , Rfl:     cholecalciferol (VITAMIN D3) 1,000 units tablet, Take 1,000 Units by mouth daily, Disp: , Rfl:     dorzolamide (TRUSOPT) 2 % ophthalmic solution, 1 drop 3 (three) times a day, Disp: , Rfl:     Enzalutamide (XTANDI) 40 mg CAPS, Take 4 capsules (160 mg total) by mouth daily, Disp: 120 capsule, Rfl: 0    metoprolol tartrate (LOPRESSOR) 25 mg tablet, Take 1 tablet (25 mg total) by mouth every 12 (twelve) hours, Disp: 30 tablet, Rfl: 5    Multiple Vitamins-Minerals (PRESERVISION/LUTEIN) CAPS, Take by mouth daily, Disp: , Rfl:     multivitamin (THERAGRAN) TABS, Take 1 tablet by mouth daily, Disp: , Rfl:     ondansetron (ZOFRAN) 8 mg tablet, TAKE ONE TABLET BY MOUTH EVERY EIGHT HOURS AS NEEDED NAUSEA AND VOMITING , Disp: 30 tablet, Rfl: 0    travoprost (TRAVATAN Z) 0 004 % ophthalmic solution, 1 drop daily at bedtime, Disp: , Rfl:     Vitamins C E (VITAMIN C/VITAMIN E PO), Take by mouth, Disp: , Rfl:     lisinopril (ZESTRIL) 20 mg tablet, Take 10 mg by mouth daily  , Disp: , Rfl:     No Known Allergies    Oncology History     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  He is on Lupron Depot shot once every 6 months from his urologist   He is on Xgeva once every 3 months    He has been taking vitamin-D and calcium for Xgeva  No more problem with his teeth  Lupron depot was started in April 2017  Disease progressed in 2018  In  June 2018 patient was started on xofigo         Prostate cancer ([facility] [address] )    1/25/2018 Initial Diagnosis     Prostate cancer [facility])        Chemotherapy      April 2017- Lupron depot   2018Alan [place]          Radiation      June 2018- xofigo            ROS:  12/26/18 Reviewed 13 systems:  Presently no other neurological, cardiac, pulmonary, GI and  symptoms other than listed above in HPI  No such symptoms like  fever, chills, bleeding, bone pains, skin rash, weight loss, numbness,  claudication and gait problem  No frequent infections  Not unusually sensitive to heat or cold  No swelling of the ankles  No swollen glands  Patient is anxious  Other symptoms are in HPI        /64 (BP Location: Right arm, Patient Position: Sitting, Cuff Size: Adult)   Pulse 97   Temp 99 °F (37 2 °C)   Resp 18   Ht 5' 10" (1 778 m)   Wt 106 kg (234 lb 3 2 oz)   SpO2 97%   BMI 33 60 kg/m²     Physical Exam:    Patient is alert and oriented  He is not in distress  Vital signs are stable  No icterus    There is no oral thrush  There is no palpable neck mass  Lung fields are clear to percussion and auscultation  Heart rate is regular  Abdomen is soft and non tender, no palpable abdominal mass, no ascites, no edema of ankles, no calf tenderness, no focal neurological deficit, no skin rash, no palpable lymphadenopathy, good arterial pulses, no clubbing  Patient is anxious  Performance status 2  IMAGING:      LABS:  Results for orders placed or performed in visit on 12/21/18   Comprehensive metabolic panel   Result Value Ref Range    Glucose, Random 122 65 - 139 mg/dL    BUN 15 7 - 25 mg/dL    Creatinine 0 68 (L) 0 70 - 1 11 mg/dL    eGFR Non  89 > OR = 60 mL/min/1 73m2    SL AMB EGFR  103 > OR = 60 mL/min/1 73m2    SL AMB BUN/CREATININE RATIO 22 6 - 22 (calc)    Sodium 136 135 - 146 mmol/L    Potassium 4 7 3 5 - 5 3 mmol/L    Chloride 105 98 - 110 mmol/L    CO2 24 20 - 32 mmol/L    SL AMB CALCIUM 8 0 (L) 8 6 - 10 3 mg/dL    SL AMB PROTEIN, TOTAL 5 9 (L) 6 1 - 8 1 g/dL    Albumin 3 4 (L) 3 6 - 5 1 g/dL    Globulin 2 5 1 9 - 3 7 g/dL (calc)    Albumin/Globulin Ratio 1 4 1 0 - 2 5 (calc)    TOTAL BILIRUBIN 0 8 0 2 - 1 2 mg/dL    Alkaline Phosphatase 450 (H) 40 - 115 U/L    SL AMB AST 41 (H) 10 - 35 U/L    SL AMB ALT 32 9 - 46 U/L   PSA, Total Screen   Result Value Ref Range    Prostate Specific Antigen Total 253 9 (H) < OR = 4 0 ng/mL     Labs, Imaging, & Other studies:     Reviewed and discussed with patient  Assessment and plan:  Patient is here with his wife and daughter  He is being treated for 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  Patient gets once every 6 month hormone injection from his urologist   He has been on Xgeva with calcium and vitamin-D  Earlier this month, December 2018 he was started on [employer] that he has been tolerating without too much problem  [place]   He is not on Casodex anymore   He feels tired and some of the tiredness could be secondary to anemia  Patient has required  transfusions  No bone pains  Minimal exertional dyspnea  He has tiredness and arthritic symptoms  History of GI blood loss likely secondary to gastric ulcers secondary to excessive NSAID use over a few week period of time  He was advised to see GI but he did not  He is no longer having black stools  He denies symptoms of abdominal bloating, burping, burning feeling  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     Physical examination and test results are as recorded and discussed  He will have blood counts tomorrow  He will have blood counts, chemistry and PSA once every 4 weeks  He is being continued on Nicole Beverage with calcium and vitamin-D and LHRH hormone shots from his urologist   He does not have problem with his teeth for Xgeva  He could be responding because there is some drop in PSA and alkaline phosphatase  All discussed in detail  Questions answered  He has follow-up office appointment  1  Prostate cancer ([facility] )    - CBC and differential; Future  - CBC and differential; Standing  - Comprehensive metabolic panel; Standing  - PSA Total, Diagnostic; Standing  - CBC and differential  - Comprehensive metabolic panel  - PSA Total, Diagnostic    2  Metastatic disease ([facility] )      3  Bone metastases (HCC)    - CBC and differential; Standing  - Comprehensive metabolic panel; Standing  - PSA Total, Diagnostic; Standing  - CBC and differential  - Comprehensive metabolic panel  - PSA Total, Diagnostic                   Patient voiced understanding and agreement in the discussion  Counseling / Coordination of Care   Greater than 50% of total time was spent with the patient and / or family counseling and / or coordination of care

## 2018-12-26 NOTE — LETTER
December 27, 0048     Mapbethany Old, DO  [address] [facility]  [address]    Patient: Sean Rios   YOB: 1935   Date of Visit: 12/26/2018       Dear Dr Mondragon Scales: Thank you for referring Benangel Schrader to me for evaluation  Below are my notes for this consultation  If you have questions, please do not hesitate to call me  I look forward to following your patient along with you  Sincerely,        To Bello MD        CC: No Recipients  To Bello MD  12/27/2018 12:10 AM  Sign at close encounter    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  Patient gets once every 6 month hormone injection from his urologist   He has been on Xgeva with calcium and vitamin-D  Earlier this month, December 2018 he was started on [place] that he has been tolerating without too much problem  Mally Prows He is not on Casodex anymore   He feels tired and some of the tiredness could be secondary to anemia  Patient has required  transfusions  No bone pains  Minimal exertional dyspnea  He has tiredness and arthritic symptoms  History of GI blood loss likely secondary to gastric ulcers secondary to excessive NSAID use over a few week period of time  He was advised to see GI but he did not  He is no longer having black stools  He denies symptoms of abdominal bloating, burping, burning feeling  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        Current Outpatient Prescriptions:     aspirin (ECOTRIN LOW STRENGTH) 81 mg EC tablet, Take 81 mg by mouth daily, Disp: , Rfl:     bicalutamide (CASODEX) 50 mg tablet, Take 1 tablet (50 mg total) by mouth daily, Disp: 90 tablet, Rfl: 3    brimonidine (ALPHAGAN P) 0 1 %, INSTILL 1 DROP TWICE A DAY INTO BOTH EYES, Disp: , Rfl:     brimonidine-timolol (COMBIGAN) 0 2-0 5 %, Administer 1 drop to both eyes every 12 (twelve) hours, Disp: , Rfl:     cholecalciferol (VITAMIN D3) 1,000 units tablet, Take 1,000 Units by mouth daily, Disp: , Rfl:     dorzolamide (TRUSOPT) 2 % ophthalmic solution, 1 drop 3 (three) times a day, Disp: , Rfl:     Enzalutamide (XTANDI) 40 mg CAPS, Take 4 capsules (160 mg total) by mouth daily, Disp: 120 capsule, Rfl: 0    metoprolol tartrate (LOPRESSOR) 25 mg tablet, Take 1 tablet (25 mg total) by mouth every 12 (twelve) hours, Disp: 30 tablet, Rfl: 5    Multiple Vitamins-Minerals (PRESERVISION/LUTEIN) CAPS, Take by mouth daily, Disp: , Rfl:     multivitamin (THERAGRAN) TABS, Take 1 tablet by mouth daily, Disp: , Rfl:     ondansetron (ZOFRAN) 8 mg tablet, TAKE ONE TABLET BY MOUTH EVERY EIGHT HOURS AS NEEDED NAUSEA AND VOMITING , Disp: 30 tablet, Rfl: 0    travoprost (TRAVATAN Z) 0 004 % ophthalmic solution, 1 drop daily at bedtime, Disp: , Rfl:     Vitamins C E (VITAMIN C/VITAMIN E PO), Take by mouth, Disp: , Rfl:     lisinopril (ZESTRIL) 20 mg tablet, Take 10 mg by mouth daily  , Disp: , Rfl:     No Known Allergies    Oncology History     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  He is on Lupron Depot shot once every 6 months from his urologist   He is on Xgeva once every 3 months    He has been taking vitamin-D and calcium for Xgeva  No more problem with his teeth  Lupron depot was started in April 2017  Disease progressed in 2018  In  June 2018 patient was started on xofigo         Prostate cancer ([address] )    1/25/2018 Initial Diagnosis     Prostate cancer [facility])        Chemotherapy      April 2017- Lupron depot   2018[place]          Radiation      June 2018- xofigo            ROS:  12/26/18 Reviewed 13 systems:  Presently no other neurological, cardiac, pulmonary, GI and  symptoms other than listed above in HPI     No such symptoms like  fever, chills, bleeding, bone pains, skin rash, weight loss, numbness, claudication and gait problem  No frequent infections  Not unusually sensitive to heat or cold  No swelling of the ankles  No swollen glands  Patient is anxious  Other symptoms are in HPI        /64 (BP Location: Right arm, Patient Position: Sitting, Cuff Size: Adult)   Pulse 97   Temp 99 °F (37 2 °C)   Resp 18   Ht 5' 10" (1 778 m)   Wt 106 kg (234 lb 3 2 oz)   SpO2 97%   BMI 33 60 kg/m²      Physical Exam:    Patient is alert and oriented  He is not in distress  Vital signs are stable  No icterus  There is no oral thrush  There is no palpable neck mass  Lung fields are clear to percussion and auscultation  Heart rate is regular  Abdomen is soft and non tender, no palpable abdominal mass, no ascites, no edema of ankles, no calf tenderness, no focal neurological deficit, no skin rash, no palpable lymphadenopathy, good arterial pulses, no clubbing  Patient is anxious  Performance status 2      IMAGING:      LABS:  Results for orders placed or performed in visit on 12/21/18   Comprehensive metabolic panel   Result Value Ref Range    Glucose, Random 122 65 - 139 mg/dL    BUN 15 7 - 25 mg/dL    Creatinine 0 68 (L) 0 70 - 1 11 mg/dL    eGFR Non  89 > OR = 60 mL/min/1 73m2    SL AMB EGFR  103 > OR = 60 mL/min/1 73m2    SL AMB BUN/CREATININE RATIO 22 6 - 22 (calc)    Sodium 136 135 - 146 mmol/L    Potassium 4 7 3 5 - 5 3 mmol/L    Chloride 105 98 - 110 mmol/L    CO2 24 20 - 32 mmol/L    SL AMB CALCIUM 8 0 (L) 8 6 - 10 3 mg/dL    SL AMB PROTEIN, TOTAL 5 9 (L) 6 1 - 8 1 g/dL    Albumin 3 4 (L) 3 6 - 5 1 g/dL    Globulin 2 5 1 9 - 3 7 g/dL (calc)    Albumin/Globulin Ratio 1 4 1 0 - 2 5 (calc)    TOTAL BILIRUBIN 0 8 0 2 - 1 2 mg/dL    Alkaline Phosphatase 450 (H) 40 - 115 U/L    SL AMB AST 41 (H) 10 - 35 U/L    SL AMB ALT 32 9 - 46 U/L   PSA, Total Screen   Result Value Ref Range    Prostate Specific Antigen Total 253 9 (H) < OR = 4 0 ng/mL     Labs, Imaging, & Other studies:     Reviewed and discussed with patient  Assessment and plan:  Patient is here with his wife and daughter  He is being treated for 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  Patient gets once every 6 month hormone injection from his urologist   He has been on Xgeva with calcium and vitamin-D  Earlier this month, December 2018 he was started on [place] that he has been tolerating without too much problem  Frederick Sanchez He is not on Casodex anymore   He feels tired and some of the tiredness could be secondary to anemia  Patient has required  transfusions  No bone pains  Minimal exertional dyspnea  He has tiredness and arthritic symptoms  History of GI blood loss likely secondary to gastric ulcers secondary to excessive NSAID use over a few week period of time  He was advised to see GI but he did not  He is no longer having black stools  He denies symptoms of abdominal bloating, burping, burning feeling  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     Physical examination and test results are as recorded and discussed  He will have blood counts tomorrow  He will have blood counts, chemistry and PSA once every 4 weeks  He is being continued on Tsosie Raker with calcium and vitamin-D and LHRH hormone shots from his urologist   He does not have problem with his teeth for Xgeva  He could be responding because there is some drop in PSA and alkaline phosphatase  All discussed in detail  Questions answered  He has follow-up office appointment  1  Prostate cancer ([address] )    - CBC and differential; Future  - CBC and differential; Standing  - Comprehensive metabolic panel; Standing  - PSA Total, Diagnostic; Standing  - CBC and differential  - Comprehensive metabolic panel  - PSA Total, Diagnostic    2  Metastatic disease ([address] )      3   Bone metastases (HCC)    - CBC and differential; Standing  - Comprehensive metabolic panel; Standing  - PSA Total, Diagnostic; Standing  - CBC and differential  - Comprehensive metabolic panel  - PSA Total, Diagnostic                   Patient voiced understanding and agreement in the discussion  Counseling / Coordination of Care   Greater than 50% of total time was spent with the patient and / or family counseling and / or coordination of care

## 2018-12-28 NOTE — DISCHARGE INSTRUCTIONS
Thrombocytopenia   WHAT YOU NEED TO KNOW:   Thrombocytopenia occurs when your body does not have enough platelets  Platelets are cells that help your blood clot  Your body may not be making enough platelets, or it may be destroying too many platelets  When platelets become low, your risk for bleeding increases  Severe bleeding may become life-threatening  DISCHARGE INSTRUCTIONS:   Call 911 for any of the following:   · You have chest pain, tightness, or heaviness that spreads to your shoulders, arms, jaw, neck, or back  · You have weakness on one side of your body, a severe headache, difficulty speaking, or a change in vision  Return to the emergency department if:   · You have bleeding that does not stop after you elevate and place pressure on the area  · You vomit blood or material that looks like coffee grounds  · Your arm or leg feels warm, tender, and painful  It may look swollen and red  · You suddenly feel lightheaded, dizzy, or weak  Contact your healthcare provider if:   · You have bleeding from your gums, mouth, or nose  · You have irregular or heavy menstrual bleeding  · You have blood in your urine or bowel movement  · You have more bruises or small red or purple spots on your skin  · You have questions or concerns about your condition or care  Medicines:   · Medicines  can help increase platelet production and prevent bleeding  · Take your medicine as directed  Contact your healthcare provider if you think your medicine is not helping or if you have side effects  Tell him or her if you are allergic to any medicine  Keep a list of the medicines, vitamins, and herbs you take  Include the amounts, and when and why you take them  Bring the list or the pill bottles to follow-up visits  Carry your medicine list with you in case of an emergency  Self-care to help prevent bleeding:  Examine your skin for minor bumps, scrapes, and cuts   These injuries can increase your risk for bleeding that can become life-threatening  · Use caution with skin and mouth care  Use a soft washcloth and a soft toothbrush  This can keep your skin and gums from bleeding  Keep your nails trimmed  If you shave, use an electric shaver  · Do not strain when you have a bowel movement  This can increase pressure in your brain and could cause bleeding  Ask your healthcare provider about a stool softener or laxative if you are constipated  Do not use enemas or suppositories  · Use a cool mist humidifier  to increase moisture in your home  This may help prevent coughing or nosebleeds  Coughing can increase pressure in your brain and cause bleeding  · Avoid activities that may cause scratches or bruises  Wear shoes or slippers to protect your feet from injury  Ask which activities are safe for you  · Do not take aspirin or NSAIDs  These medicines can cause you to bleed and bruise more easily  Wear medical alert identification:  Wear a medical alert bracelet or carry a card that says you have thrombocytopenia  Ask where to get these items  Follow up with your healthcare provider as directed: You will need to return for more blood tests  Write down your questions so you remember to ask them during your visits  © 2017 [address] [employer] Information is for End User's use only and may not be sold, redistributed or otherwise used for commercial purposes  All illustrations and images included in CareNotes® are the copyrighted property of [employer]  or Tino Gomez  The above information is an  only  It is not intended as medical advice for individual conditions or treatments  Talk to your doctor, nurse or pharmacist before following any medical regimen to see if it is safe and effective for you  Anemia   WHAT YOU NEED TO KNOW:   Anemia is a low number of red blood cells or a low amount of hemoglobin in your red blood cells   Hemoglobin is a protein that helps carry oxygen throughout your body  Red blood cells use iron to create hemoglobin  Anemia may develop if your body does not have enough iron  It may also develop if your body does not make enough red blood cells or they die faster than your body can make them  DISCHARGE INSTRUCTIONS:   Call 911 or have someone call 911 for any of the following:   · You lose consciousness  · You have severe chest pain  Return to the emergency department if:   · You have dark or bloody bowel movements  Contact your healthcare provider if:   · Your symptoms are worse, even after treatment  · You have questions or concerns about your condition or care  Medicines:   · Iron or folic acid supplements  help increase your red blood cell and hemoglobin levels  · Vitamin B12 injections  may help boost your red blood cell level and decrease your symptoms  Ask your healthcare provider how to inject B12  · Take your medicine as directed  Contact your healthcare provider if you think your medicine is not helping or if you have side effects  Tell him of her if you are allergic to any medicine  Keep a list of the medicines, vitamins, and herbs you take  Include the amounts, and when and why you take them  Bring the list or the pill bottles to follow-up visits  Carry your medicine list with you in case of an emergency  Prevent anemia:  Eat healthy foods rich in iron and vitamin C  Nuts, meat, dark leafy green vegetables, and beans are high in iron and protein  Vitamin C helps your body absorb iron  Foods rich in vitamin C include oranges and other citrus fruits  Ask your healthcare provider for a list of other foods that are high in iron or vitamin C  Ask if you need to be on a special diet  Follow up with your healthcare provider as directed:  Write down your questions so you remember to ask them during your visits     © 2017 [employer] Information is for End User's use only and may not be sold, redistributed or otherwise used for commercial purposes  All illustrations and images included in CareNotes® are the copyrighted property of [employer]  or Tino Gomez  The above information is an  only  It is not intended as medical advice for individual conditions or treatments  Talk to your doctor, nurse or pharmacist before following any medical regimen to see if it is safe and effective for you

## 2018-12-28 NOTE — ED PROVIDER NOTES
History  Chief Complaint   Patient presents with    Abnormal Lab     Referred to ED by oncologist for lower hemoglboin and RBC levels  also reports weakness and dizziness  81 y o  M w/ h/o transfusions p/w low Hg/low plts  Pt was supposed to go to the infusion center, but couldn't make the appointment, so he was sent here  Per hem/onc note for today, pt is supposed to have 1u RBCs for a hg of 7 and 1 unit of platelets for his level of 18,000  He denies CP, SOB  Just feels generally fatigued  History provided by:  Patient   used: No    Anemia   Location:  Blood  Duration:  1 day  Timing:  Constant  Progression:  Unchanged  Chronicity:  New  Relieved by:  None  Worsened by:  None  Ineffective treatments:  None tried  Associated symptoms: fatigue    Associated symptoms: no abdominal pain, no chest pain, no cough, no fever, no nausea, no shortness of breath and no vomiting        Prior to Admission Medications   Prescriptions Last Dose Informant Patient Reported? Taking?    Enzalutamide (XTANDI) 40 mg CAPS  Self No No   Sig: Take 4 capsules (160 mg total) by mouth daily   Multiple Vitamins-Minerals (PRESERVISION/LUTEIN) CAPS  Self Yes Yes   Sig: Take by mouth daily   Vitamins C E (VITAMIN C/VITAMIN E PO)  Self Yes Yes   Sig: Take by mouth   bicalutamide (CASODEX) 50 mg tablet   Yes Yes   Sig: Take 50 mg by mouth 3 (three) times a day   brimonidine (ALPHAGAN P) 0 1 %  Self Yes Yes   Sig: INSTILL 1 DROP TWICE A DAY INTO BOTH EYES   brimonidine-timolol (COMBIGAN) 0 2-0 5 %  Self Yes Yes   Sig: Administer 1 drop to both eyes every 12 (twelve) hours   cholecalciferol (VITAMIN D3) 1,000 units tablet  Self Yes Yes   Sig: Take 1,000 Units by mouth daily   dorzolamide (TRUSOPT) 2 % ophthalmic solution  Self Yes Yes   Si drop 3 (three) times a day   lisinopril (ZESTRIL) 20 mg tablet  Self Yes Yes   Sig: Take 10 mg by mouth daily     metoprolol tartrate (LOPRESSOR) 25 mg tablet  Self No Yes Sig: Take 1 tablet (25 mg total) by mouth every 12 (twelve) hours   multivitamin (THERAGRAN) TABS  Self Yes Yes   Sig: Take 1 tablet by mouth daily   ondansetron (ZOFRAN) 8 mg tablet  Self No Yes   Sig: TAKE ONE TABLET BY MOUTH EVERY EIGHT HOURS AS NEEDED NAUSEA AND VOMITING    travoprost (TRAVATAN Z) 0 004 % ophthalmic solution  Self Yes Yes   Si drop daily at bedtime      Facility-Administered Medications: None       Past Medical History:   Diagnosis Date    Acute bronchitis     Hypertension     Prostate CA ([address] )     Urinary frequency     UTI (urinary tract infection)        Past Surgical History:   Procedure Laterality Date    CHOLECYSTECTOMY      COLONOSCOPY      [id number] ([place])       Family History   Problem Relation Age of Onset    Breast cancer Mother     Prostate cancer Father     Prostate cancer Brother     Stroke Family         CVA    Hypertension Family      I have reviewed and agree with the history as documented  Social History   Substance Use Topics    Smoking status: Former Smoker     Quit date:     Smokeless tobacco: Never Used    Alcohol use Yes      Comment: 1/day  Per [employer]scripts: Social        Review of Systems   Constitutional: Positive for fatigue  Negative for chills, diaphoresis and fever  Respiratory: Negative for cough, chest tightness and shortness of breath  Cardiovascular: Negative for chest pain, palpitations and leg swelling  Gastrointestinal: Negative for abdominal pain, nausea and vomiting  Musculoskeletal: Negative for back pain and neck pain  Skin: Negative for pallor  Neurological: Negative for dizziness, syncope, facial asymmetry, speech difficulty, weakness, light-headedness and numbness  All other systems reviewed and are negative  Physical Exam  Physical Exam   Constitutional: He is oriented to person, place, and time  He appears well-developed and well-nourished  Non-toxic appearance  He does not have a sickly appearance   He does not appear ill  No distress  HENT:   Head: Normocephalic and atraumatic  Mouth/Throat: Oropharynx is clear and moist    Eyes: Pupils are equal, round, and reactive to light  EOM are normal    Neck: Normal range of motion  Neck supple  No JVD present  Cardiovascular: Normal rate, regular rhythm, S1 normal, S2 normal, normal heart sounds, intact distal pulses and normal pulses  Exam reveals no gallop and no friction rub  No murmur heard  Pulmonary/Chest: Effort normal and breath sounds normal  No respiratory distress  He has no wheezes  He has no rales  He exhibits no tenderness  Abdominal: Soft  Bowel sounds are normal  He exhibits no distension  There is no tenderness  There is no rebound and no guarding  Neurological: He is alert and oriented to person, place, and time  He has normal strength  No cranial nerve deficit or sensory deficit  Skin: Skin is warm and dry  No rash noted  He is not diaphoretic  No pallor  Nursing note and vitals reviewed        Vital Signs  ED Triage Vitals   Temperature Pulse Respirations Blood Pressure SpO2   12/28/18 1540 12/28/18 1540 12/28/18 1540 12/28/18 1541 12/28/18 1540   98 1 °F (36 7 °C) 104 22 146/63 99 %      Temp Source Heart Rate Source Patient Position - Orthostatic VS BP Location FiO2 (%)   12/28/18 1540 12/28/18 1540 12/28/18 1540 12/28/18 1540 --   Oral Monitor Sitting Right arm       Pain Score       12/28/18 1540       No Pain           Vitals:    12/28/18 1540 12/28/18 1541 12/28/18 1654   BP:  146/63 124/66   Pulse: 104  83   Patient Position - Orthostatic VS: Sitting  Lying       Visual Acuity      ED Medications  Medications - No data to display    Diagnostic Studies  Results Reviewed     None                 No orders to display              Procedures  Procedures       Phone Contacts  ED Phone Contact    ED Course                               MDM  CritCare Time    Disposition  Final diagnoses:   Anemia   Thrombocytopenia ([address] )     Time reflects when diagnosis was documented in both MDM as applicable and the Disposition within this note     Time User Action Codes Description Comment    12/28/2018  4:57 PM George Notice [D64 9] Anemia     12/28/2018  4:57 PM Honey Mead Add [D69 6] [facility])       ED Disposition     None      Follow-up Information    None         Patient's Medications   Discharge Prescriptions    No medications on file     No discharge procedures on file      ED Provider  Electronically Signed by           Simona Gorman 25, DO  12/28/18 [id number]

## 2018-12-28 NOTE — TELEPHONE ENCOUNTER
Receive patient's blood work of hemoglobin 7 and platelet [id number]  [facility] does not have any openings for platelets or blood transfusion tomorrow  Melissa Van had an opening for platelet transfusion tomorrow on \[facility]"" infusion had opening for blood transfusion on Monday  Patient's wife did not want to drive to [place] and did not want to drive to the [facility] for type and screen for Mondays transfusion at \[facility]"" infusion center  Patient's wife states that he has been having fatigue and dizziness that has been progressing over the past 1 week  Therefore, patient was advised to go to the emergency department  I wrote recommendations in ADT form for Emergency Department  Again, patient to receive 1 unit of PRBCs and 1 bag of platelets  Please call our service with any questions

## 2018-12-31 NOTE — TELEPHONE ENCOUNTER
Patient was told to call  Gifty López when he needed refills on his Xtandi 40mg #90 qd send to [address]

## 2019-01-01 ENCOUNTER — HOSPITAL ENCOUNTER (OUTPATIENT)
Dept: INFUSION CENTER | Facility: CLINIC | Age: 84
Discharge: HOME | End: 2019-08-14
Payer: MEDICARE

## 2019-01-01 ENCOUNTER — OFFICE VISIT (OUTPATIENT)
Dept: HEMATOLOGY ONCOLOGY | Facility: CLINIC | Age: 84
End: 2019-01-01
Payer: MEDICARE

## 2019-01-01 ENCOUNTER — OFFICE VISIT (OUTPATIENT)
Dept: FAMILY MEDICINE CLINIC | Facility: CLINIC | Age: 84
End: 2019-01-01
Payer: MEDICARE

## 2019-01-01 ENCOUNTER — TELEPHONE (OUTPATIENT)
Dept: HEMATOLOGY ONCOLOGY | Facility: CLINIC | Age: 84
End: 2019-01-01

## 2019-01-01 ENCOUNTER — TELEPHONE (OUTPATIENT)
Dept: UROLOGY | Facility: AMBULATORY SURGERY CENTER | Age: 84
End: 2019-01-01

## 2019-01-01 ENCOUNTER — TRANSCRIBE ORDERS (OUTPATIENT)
Dept: LAB | Facility: HOSPITAL | Age: 84
End: 2019-01-01

## 2019-01-01 ENCOUNTER — APPOINTMENT (OUTPATIENT)
Dept: LAB | Facility: CLINIC | Age: 84
End: 2019-01-01
Payer: MEDICARE

## 2019-01-01 ENCOUNTER — APPOINTMENT (OUTPATIENT)
Dept: LAB | Facility: HOSPITAL | Age: 84
End: 2019-01-01
Attending: INTERNAL MEDICINE
Payer: MEDICARE

## 2019-01-01 ENCOUNTER — TELEPHONE (OUTPATIENT)
Dept: FAMILY MEDICINE CLINIC | Facility: CLINIC | Age: 84
End: 2019-01-01

## 2019-01-01 ENCOUNTER — APPOINTMENT (OUTPATIENT)
Dept: LAB | Facility: HOSPITAL | Age: 84
End: 2019-01-01
Payer: MEDICARE

## 2019-01-01 ENCOUNTER — HOSPITAL ENCOUNTER (OUTPATIENT)
Dept: INFUSION CENTER | Facility: CLINIC | Age: 84
Discharge: HOME | End: 2019-03-08
Payer: MEDICARE

## 2019-01-01 ENCOUNTER — HOSPITAL ENCOUNTER (OUTPATIENT)
Dept: INFUSION CENTER | Facility: CLINIC | Age: 84
Discharge: HOME | End: 2019-09-20
Payer: MEDICARE

## 2019-01-01 ENCOUNTER — HOSPITAL ENCOUNTER (OUTPATIENT)
Dept: INFUSION CENTER | Facility: CLINIC | Age: 84
Discharge: HOME | End: 2019-06-19
Payer: MEDICARE

## 2019-01-01 ENCOUNTER — APPOINTMENT (EMERGENCY)
Dept: CT IMAGING | Facility: HOSPITAL | Age: 84
DRG: 723 | End: 2019-01-01
Payer: MEDICARE

## 2019-01-01 ENCOUNTER — HOSPITAL ENCOUNTER (OUTPATIENT)
Dept: INFUSION CENTER | Facility: CLINIC | Age: 84
Discharge: HOME | End: 2019-07-19
Payer: MEDICARE

## 2019-01-01 ENCOUNTER — HOSPITAL ENCOUNTER (OUTPATIENT)
Dept: INFUSION CENTER | Facility: CLINIC | Age: 84
Discharge: HOME | End: 2019-07-18
Payer: MEDICARE

## 2019-01-01 ENCOUNTER — APPOINTMENT (EMERGENCY)
Dept: RADIOLOGY | Facility: HOSPITAL | Age: 84
DRG: 723 | End: 2019-01-01
Payer: MEDICARE

## 2019-01-01 ENCOUNTER — OFFICE VISIT (OUTPATIENT)
Dept: UROLOGY | Facility: CLINIC | Age: 84
End: 2019-01-01
Payer: MEDICARE

## 2019-01-01 ENCOUNTER — HOSPITAL ENCOUNTER (OUTPATIENT)
Dept: INFUSION CENTER | Facility: CLINIC | Age: 84
Discharge: HOME | End: 2019-01-31

## 2019-01-01 ENCOUNTER — HOSPITAL ENCOUNTER (OUTPATIENT)
Dept: INFUSION CENTER | Facility: CLINIC | Age: 84
Discharge: HOME | End: 2019-05-29
Payer: MEDICARE

## 2019-01-01 ENCOUNTER — HOSPITAL ENCOUNTER (OUTPATIENT)
Dept: INFUSION CENTER | Facility: CLINIC | Age: 84
Discharge: HOME | End: 2019-02-01
Payer: MEDICARE

## 2019-01-01 ENCOUNTER — HOSPITAL ENCOUNTER (OUTPATIENT)
Dept: RADIOLOGY | Facility: HOSPITAL | Age: 84
Discharge: HOME | End: 2019-11-21

## 2019-01-01 ENCOUNTER — TRANSITIONAL CARE MANAGEMENT (OUTPATIENT)
Dept: FAMILY MEDICINE CLINIC | Facility: CLINIC | Age: 84
End: 2019-01-01

## 2019-01-01 ENCOUNTER — HOSPITAL ENCOUNTER (OUTPATIENT)
Dept: INFUSION CENTER | Facility: CLINIC | Age: 84
Discharge: HOME | End: 2019-01-22
Payer: MEDICARE

## 2019-01-01 ENCOUNTER — HOSPITAL ENCOUNTER (OUTPATIENT)
Dept: INFUSION CENTER | Facility: CLINIC | Age: 84
Discharge: HOME | End: 2019-10-09
Payer: MEDICARE

## 2019-01-01 ENCOUNTER — TRANSCRIBE ORDERS (OUTPATIENT)
Dept: LAB | Facility: CLINIC | Age: 84
End: 2019-01-01

## 2019-01-01 ENCOUNTER — HOSPITAL ENCOUNTER (OUTPATIENT)
Dept: INFUSION CENTER | Facility: CLINIC | Age: 84
Discharge: HOME | End: 2019-07-17
Payer: MEDICARE

## 2019-01-01 ENCOUNTER — APPOINTMENT (OUTPATIENT)
Dept: LAB | Facility: MEDICAL CENTER | Age: 84
End: 2019-01-01
Payer: MEDICARE

## 2019-01-01 ENCOUNTER — HOSPITAL ENCOUNTER (OUTPATIENT)
Dept: INFUSION CENTER | Facility: CLINIC | Age: 84
Discharge: HOME | End: 2019-04-25
Payer: MEDICARE

## 2019-01-01 ENCOUNTER — TRANSCRIBE ORDERS (OUTPATIENT)
Dept: FAMILY MEDICINE CLINIC | Facility: CLINIC | Age: 84
End: 2019-01-01

## 2019-01-01 ENCOUNTER — APPOINTMENT (OUTPATIENT)
Dept: LAB | Facility: HOSPITAL | Age: 84
DRG: 723 | End: 2019-01-01
Attending: INTERNAL MEDICINE
Payer: MEDICARE

## 2019-01-01 ENCOUNTER — APPOINTMENT (INPATIENT)
Dept: MRI IMAGING | Facility: HOSPITAL | Age: 84
DRG: 723 | End: 2019-01-01
Payer: MEDICARE

## 2019-01-01 ENCOUNTER — HOSPITAL ENCOUNTER (OUTPATIENT)
Dept: INFUSION CENTER | Facility: CLINIC | Age: 84
Discharge: HOME | End: 2019-09-10
Payer: MEDICARE

## 2019-01-01 ENCOUNTER — DOCUMENTATION (OUTPATIENT)
Dept: HEMATOLOGY ONCOLOGY | Facility: CLINIC | Age: 84
End: 2019-01-01

## 2019-01-01 ENCOUNTER — HOSPITAL ENCOUNTER (OUTPATIENT)
Dept: INFUSION CENTER | Facility: CLINIC | Age: 84
Discharge: HOME | End: 2019-11-06

## 2019-01-01 ENCOUNTER — OFFICE VISIT (OUTPATIENT)
Dept: PALLIATIVE MEDICINE | Age: 84
End: 2019-01-01
Payer: MEDICARE

## 2019-01-01 ENCOUNTER — HOSPITAL ENCOUNTER (OUTPATIENT)
Dept: CT IMAGING | Facility: HOSPITAL | Age: 84
Discharge: HOME | End: 2019-11-13
Payer: MEDICARE

## 2019-01-01 ENCOUNTER — TELEPHONE (OUTPATIENT)
Dept: HEMATOLOGY ONCOLOGY | Facility: HOSPITAL | Age: 84
End: 2019-01-01

## 2019-01-01 ENCOUNTER — HOSPITAL ENCOUNTER (INPATIENT)
Facility: HOSPITAL | Age: 84
LOS: 2 days | Discharge: HOME HEALTH | DRG: 723 | End: 2019-11-07
Attending: EMERGENCY MEDICINE | Admitting: INTERNAL MEDICINE
Payer: MEDICARE

## 2019-01-01 VITALS
DIASTOLIC BLOOD PRESSURE: 83 MMHG | RESPIRATION RATE: 18 BRPM | SYSTOLIC BLOOD PRESSURE: 129 MMHG | HEART RATE: 87 BPM | BODY MASS INDEX: 30.6 KG/M2 | WEIGHT: 206.57 LBS | HEIGHT: 69 IN | TEMPERATURE: 97.8 F

## 2019-01-01 VITALS
BODY MASS INDEX: 29.98 KG/M2 | HEIGHT: 70 IN | RESPIRATION RATE: 18 BRPM | DIASTOLIC BLOOD PRESSURE: 67 MMHG | TEMPERATURE: 96.9 F | WEIGHT: 209.44 LBS | SYSTOLIC BLOOD PRESSURE: 119 MMHG | HEART RATE: 99 BPM

## 2019-01-01 VITALS
BODY MASS INDEX: 33.33 KG/M2 | DIASTOLIC BLOOD PRESSURE: 80 MMHG | HEIGHT: 69 IN | SYSTOLIC BLOOD PRESSURE: 140 MMHG | TEMPERATURE: 97.7 F | OXYGEN SATURATION: 98 % | RESPIRATION RATE: 19 BRPM | WEIGHT: 225 LBS | HEART RATE: 79 BPM

## 2019-01-01 VITALS
DIASTOLIC BLOOD PRESSURE: 79 MMHG | HEART RATE: 87 BPM | TEMPERATURE: 97.9 F | SYSTOLIC BLOOD PRESSURE: 124 MMHG | RESPIRATION RATE: 16 BRPM

## 2019-01-01 VITALS
SYSTOLIC BLOOD PRESSURE: 138 MMHG | DIASTOLIC BLOOD PRESSURE: 76 MMHG | RESPIRATION RATE: 16 BRPM | TEMPERATURE: 98.3 F | HEART RATE: 74 BPM

## 2019-01-01 VITALS
SYSTOLIC BLOOD PRESSURE: 100 MMHG | OXYGEN SATURATION: 98 % | WEIGHT: 198.2 LBS | TEMPERATURE: 97.7 F | HEIGHT: 68 IN | DIASTOLIC BLOOD PRESSURE: 50 MMHG | BODY MASS INDEX: 30.04 KG/M2 | HEART RATE: 92 BPM

## 2019-01-01 VITALS
HEART RATE: 71 BPM | SYSTOLIC BLOOD PRESSURE: 136 MMHG | TEMPERATURE: 97.2 F | DIASTOLIC BLOOD PRESSURE: 76 MMHG | RESPIRATION RATE: 18 BRPM

## 2019-01-01 VITALS
HEART RATE: 85 BPM | BODY MASS INDEX: 29.63 KG/M2 | HEIGHT: 70 IN | WEIGHT: 207 LBS | SYSTOLIC BLOOD PRESSURE: 122 MMHG | DIASTOLIC BLOOD PRESSURE: 80 MMHG

## 2019-01-01 VITALS
OXYGEN SATURATION: 96 % | SYSTOLIC BLOOD PRESSURE: 104 MMHG | HEART RATE: 109 BPM | TEMPERATURE: 100.6 F | DIASTOLIC BLOOD PRESSURE: 60 MMHG

## 2019-01-01 VITALS
TEMPERATURE: 98.7 F | SYSTOLIC BLOOD PRESSURE: 119 MMHG | HEART RATE: 76 BPM | RESPIRATION RATE: 18 BRPM | DIASTOLIC BLOOD PRESSURE: 76 MMHG

## 2019-01-01 VITALS
OXYGEN SATURATION: 98 % | TEMPERATURE: 98.2 F | DIASTOLIC BLOOD PRESSURE: 68 MMHG | SYSTOLIC BLOOD PRESSURE: 114 MMHG | HEART RATE: 97 BPM | RESPIRATION RATE: 18 BRPM

## 2019-01-01 VITALS — WEIGHT: 217.59 LBS | BODY MASS INDEX: 32.23 KG/M2 | HEIGHT: 69 IN

## 2019-01-01 VITALS — WEIGHT: 199.52 LBS | BODY MASS INDEX: 28.56 KG/M2 | HEIGHT: 70 IN

## 2019-01-01 VITALS
HEIGHT: 69 IN | SYSTOLIC BLOOD PRESSURE: 112 MMHG | WEIGHT: 207.4 LBS | HEART RATE: 111 BPM | BODY MASS INDEX: 30.72 KG/M2 | DIASTOLIC BLOOD PRESSURE: 50 MMHG | TEMPERATURE: 97.5 F | OXYGEN SATURATION: 97 %

## 2019-01-01 VITALS
HEART RATE: 78 BPM | SYSTOLIC BLOOD PRESSURE: 115 MMHG | TEMPERATURE: 98.4 F | RESPIRATION RATE: 20 BRPM | DIASTOLIC BLOOD PRESSURE: 72 MMHG

## 2019-01-01 VITALS
SYSTOLIC BLOOD PRESSURE: 130 MMHG | HEART RATE: 97 BPM | RESPIRATION RATE: 18 BRPM | DIASTOLIC BLOOD PRESSURE: 76 MMHG | TEMPERATURE: 97.7 F

## 2019-01-01 VITALS
HEART RATE: 81 BPM | TEMPERATURE: 97.5 F | SYSTOLIC BLOOD PRESSURE: 125 MMHG | DIASTOLIC BLOOD PRESSURE: 75 MMHG | RESPIRATION RATE: 18 BRPM

## 2019-01-01 VITALS
TEMPERATURE: 99 F | WEIGHT: 223.4 LBS | SYSTOLIC BLOOD PRESSURE: 92 MMHG | HEART RATE: 68 BPM | HEIGHT: 69 IN | DIASTOLIC BLOOD PRESSURE: 60 MMHG | BODY MASS INDEX: 33.09 KG/M2

## 2019-01-01 VITALS
HEART RATE: 67 BPM | SYSTOLIC BLOOD PRESSURE: 126 MMHG | TEMPERATURE: 98.4 F | RESPIRATION RATE: 18 BRPM | DIASTOLIC BLOOD PRESSURE: 67 MMHG

## 2019-01-01 VITALS
RESPIRATION RATE: 16 BRPM | TEMPERATURE: 98.7 F | WEIGHT: 206 LBS | BODY MASS INDEX: 29.49 KG/M2 | SYSTOLIC BLOOD PRESSURE: 138 MMHG | OXYGEN SATURATION: 97 % | HEART RATE: 100 BPM | DIASTOLIC BLOOD PRESSURE: 72 MMHG | HEIGHT: 70 IN

## 2019-01-01 VITALS
SYSTOLIC BLOOD PRESSURE: 112 MMHG | TEMPERATURE: 98.8 F | HEART RATE: 92 BPM | RESPIRATION RATE: 16 BRPM | DIASTOLIC BLOOD PRESSURE: 71 MMHG

## 2019-01-01 VITALS
SYSTOLIC BLOOD PRESSURE: 121 MMHG | RESPIRATION RATE: 16 BRPM | HEART RATE: 90 BPM | DIASTOLIC BLOOD PRESSURE: 58 MMHG | WEIGHT: 210.32 LBS | BODY MASS INDEX: 31.98 KG/M2 | TEMPERATURE: 99.3 F | OXYGEN SATURATION: 96 %

## 2019-01-01 DIAGNOSIS — C61 PROSTATE CANCER (HCC): ICD-10-CM

## 2019-01-01 DIAGNOSIS — C61 PROSTATE CANCER (HCC): Primary | ICD-10-CM

## 2019-01-01 DIAGNOSIS — R26.2 AMBULATORY DYSFUNCTION: ICD-10-CM

## 2019-01-01 DIAGNOSIS — E78.5 HYPERLIPIDEMIA, ACQUIRED: ICD-10-CM

## 2019-01-01 DIAGNOSIS — R29.6 MULTIPLE FALLS: Primary | ICD-10-CM

## 2019-01-01 DIAGNOSIS — F41.9 ANXIETY AND DEPRESSION: ICD-10-CM

## 2019-01-01 DIAGNOSIS — D69.6 THROMBOCYTOPENIA (HCC): ICD-10-CM

## 2019-01-01 DIAGNOSIS — C79.51 BONE METASTASES (HCC): ICD-10-CM

## 2019-01-01 DIAGNOSIS — E66.09 CLASS 2 OBESITY DUE TO EXCESS CALORIES WITHOUT SERIOUS COMORBIDITY WITH BODY MASS INDEX (BMI) OF 38.0 TO 38.9 IN ADULT: ICD-10-CM

## 2019-01-01 DIAGNOSIS — Z23 ENCOUNTER FOR IMMUNIZATION: ICD-10-CM

## 2019-01-01 DIAGNOSIS — C79.51 SECONDARY MALIGNANT NEOPLASM OF BONE AND BONE MARROW (HCC): Primary | ICD-10-CM

## 2019-01-01 DIAGNOSIS — D61.89 ANEMIA DUE TO OTHER BONE MARROW FAILURE (HCC): ICD-10-CM

## 2019-01-01 DIAGNOSIS — C79.9 METASTATIC DISEASE (HCC): ICD-10-CM

## 2019-01-01 DIAGNOSIS — D64.9 ANEMIA, UNSPECIFIED TYPE: ICD-10-CM

## 2019-01-01 DIAGNOSIS — E03.9 HYPOTHYROIDISM, UNSPECIFIED TYPE: ICD-10-CM

## 2019-01-01 DIAGNOSIS — C61 MALIGNANT NEOPLASM OF PROSTATE (HCC): ICD-10-CM

## 2019-01-01 DIAGNOSIS — D64.9 ANEMIA, UNSPECIFIED TYPE: Primary | ICD-10-CM

## 2019-01-01 DIAGNOSIS — F32.89 OTHER DEPRESSION: Primary | ICD-10-CM

## 2019-01-01 DIAGNOSIS — G89.3 CANCER ASSOCIATED PAIN: ICD-10-CM

## 2019-01-01 DIAGNOSIS — I10 ESSENTIAL HYPERTENSION, BENIGN: ICD-10-CM

## 2019-01-01 DIAGNOSIS — E07.9 THYROID DISORDER: ICD-10-CM

## 2019-01-01 DIAGNOSIS — C79.52 SECONDARY MALIGNANT NEOPLASM OF BONE AND BONE MARROW (HCC): ICD-10-CM

## 2019-01-01 DIAGNOSIS — C79.51 SECONDARY MALIGNANT NEOPLASM OF BONE AND BONE MARROW (HCC): ICD-10-CM

## 2019-01-01 DIAGNOSIS — C79.52 SECONDARY MALIGNANT NEOPLASM OF BONE AND BONE MARROW (HCC): Primary | ICD-10-CM

## 2019-01-01 DIAGNOSIS — E87.1 HYPONATREMIA: ICD-10-CM

## 2019-01-01 DIAGNOSIS — C61 PROSTATE CANCER METASTATIC TO BONE (HCC): Primary | ICD-10-CM

## 2019-01-01 DIAGNOSIS — C79.51 PROSTATE CANCER METASTATIC TO BONE (HCC): Primary | ICD-10-CM

## 2019-01-01 DIAGNOSIS — F32.9 REACTIVE DEPRESSION: Primary | ICD-10-CM

## 2019-01-01 DIAGNOSIS — F32.A ANXIETY AND DEPRESSION: ICD-10-CM

## 2019-01-01 DIAGNOSIS — D61.89 ANEMIA DUE TO OTHER BONE MARROW FAILURE (HCC): Primary | ICD-10-CM

## 2019-01-01 DIAGNOSIS — D50.0 BLOOD LOSS ANEMIA: ICD-10-CM

## 2019-01-01 DIAGNOSIS — D61.818 PANCYTOPENIA (HCC): ICD-10-CM

## 2019-01-01 DIAGNOSIS — Z71.89 GOALS OF CARE, COUNSELING/DISCUSSION: ICD-10-CM

## 2019-01-01 DIAGNOSIS — R53.1 WEAKNESS: ICD-10-CM

## 2019-01-01 DIAGNOSIS — E03.9 HYPOTHYROIDISM, UNSPECIFIED TYPE: Primary | ICD-10-CM

## 2019-01-01 DIAGNOSIS — Z00.00 HEALTH CARE MAINTENANCE: Primary | ICD-10-CM

## 2019-01-01 DIAGNOSIS — R53.82 CHRONIC FATIGUE: ICD-10-CM

## 2019-01-01 DIAGNOSIS — E78.5 HYPERLIPIDEMIA, ACQUIRED: Primary | ICD-10-CM

## 2019-01-01 DIAGNOSIS — C61 MALIGNANT NEOPLASM OF PROSTATE (HCC): Primary | ICD-10-CM

## 2019-01-01 DIAGNOSIS — K59.00 CONSTIPATION: ICD-10-CM

## 2019-01-01 DIAGNOSIS — R53.1 GENERALIZED WEAKNESS: ICD-10-CM

## 2019-01-01 DIAGNOSIS — H35.30 MACULAR DEGENERATION: ICD-10-CM

## 2019-01-01 DIAGNOSIS — E03.9 ACQUIRED HYPOTHYROIDISM: ICD-10-CM

## 2019-01-01 DIAGNOSIS — I12.9 BENIGN HYPERTENSIVE CKD, STAGE 1-4 OR UNSPECIFIED CHRONIC KIDNEY DISEASE: ICD-10-CM

## 2019-01-01 DIAGNOSIS — D50.8 OTHER IRON DEFICIENCY ANEMIA: ICD-10-CM

## 2019-01-01 DIAGNOSIS — H91.93 DECREASED HEARING, BILATERAL: ICD-10-CM

## 2019-01-01 DIAGNOSIS — D50.0 BLOOD LOSS ANEMIA: Primary | ICD-10-CM

## 2019-01-01 LAB
ABO GROUP BLD BPU: NORMAL
ABO GROUP BLD: NORMAL
ALBUMIN SERPL BCP-MCNC: 2.6 G/DL (ref 3.5–5)
ALBUMIN SERPL BCP-MCNC: 2.8 G/DL (ref 3.5–5)
ALBUMIN SERPL BCP-MCNC: 2.9 G/DL (ref 3.5–5.7)
ALBUMIN SERPL BCP-MCNC: 3 G/DL (ref 3.5–5)
ALBUMIN SERPL BCP-MCNC: 3.1 G/DL (ref 3.5–5)
ALBUMIN SERPL-MCNC: 3.7 G/DL (ref 3.6–5.1)
ALBUMIN/GLOB SERPL: 1.4 (CALC) (ref 1–2.5)
ALP SERPL-CCNC: 296 U/L (ref 46–116)
ALP SERPL-CCNC: 303 U/L (ref 46–116)
ALP SERPL-CCNC: 326 U/L (ref 46–116)
ALP SERPL-CCNC: 329 U/L (ref 46–116)
ALP SERPL-CCNC: 390 U/L (ref 46–116)
ALP SERPL-CCNC: 475 U/L (ref 46–116)
ALP SERPL-CCNC: 705 U/L (ref 46–116)
ALP SERPL-CCNC: 757 U/L (ref 40–115)
ALT SERPL W P-5'-P-CCNC: 10 U/L (ref 12–78)
ALT SERPL W P-5'-P-CCNC: 13 U/L (ref 12–78)
ALT SERPL W P-5'-P-CCNC: 13 U/L (ref 12–78)
ALT SERPL W P-5'-P-CCNC: 14 U/L (ref 12–78)
ALT SERPL W P-5'-P-CCNC: 15 U/L (ref 12–78)
ALT SERPL W P-5'-P-CCNC: 19 U/L (ref 12–78)
ALT SERPL W P-5'-P-CCNC: 28 U/L (ref 12–78)
ALT SERPL-CCNC: 28 U/L (ref 9–46)
ANION GAP SERPL CALCULATED.3IONS-SCNC: 10 MMOL/L (ref 4–13)
ANION GAP SERPL CALCULATED.3IONS-SCNC: 3 MMOL/L (ref 4–13)
ANION GAP SERPL CALCULATED.3IONS-SCNC: 5 MMOL/L (ref 4–13)
ANION GAP SERPL CALCULATED.3IONS-SCNC: 6 MMOL/L (ref 4–13)
ANION GAP SERPL CALCULATED.3IONS-SCNC: 6 MMOL/L (ref 4–13)
ANION GAP SERPL CALCULATED.3IONS-SCNC: 7 MMOL/L (ref 4–13)
ANION GAP SERPL CALCULATED.3IONS-SCNC: 7 MMOL/L (ref 4–13)
ANION GAP SERPL CALCULATED.3IONS-SCNC: 8 MMOL/L (ref 4–13)
ANION GAP SERPL CALCULATED.3IONS-SCNC: 8 MMOL/L (ref 4–13)
ANISOCYTOSIS BLD QL SMEAR: PRESENT
APTT PPP: 41 SECONDS (ref 23–37)
AST SERPL W P-5'-P-CCNC: 25 U/L (ref 5–45)
AST SERPL W P-5'-P-CCNC: 26 U/L (ref 5–45)
AST SERPL W P-5'-P-CCNC: 27 U/L (ref 5–45)
AST SERPL W P-5'-P-CCNC: 34 U/L (ref 5–45)
AST SERPL W P-5'-P-CCNC: 35 U/L (ref 5–45)
AST SERPL W P-5'-P-CCNC: 35 U/L (ref 5–45)
AST SERPL W P-5'-P-CCNC: 36 U/L (ref 5–45)
AST SERPL-CCNC: 33 U/L (ref 10–35)
ATRIAL RATE: 85 BPM
BASOPHILS # BLD AUTO: 0.01 THOUSANDS/ΜL (ref 0–0.1)
BASOPHILS # BLD AUTO: 0.01 THOUSANDS/ΜL (ref 0–0.1)
BASOPHILS # BLD AUTO: 0.02 THOUSANDS/ΜL (ref 0–0.1)
BASOPHILS # BLD AUTO: 108 CELLS/UL (ref 0–200)
BASOPHILS # BLD MANUAL: 0 THOUSAND/UL (ref 0–0.1)
BASOPHILS # BLD MANUAL: 0 THOUSAND/UL (ref 0–0.1)
BASOPHILS # BLD MANUAL: 0.03 THOUSAND/UL (ref 0–0.1)
BASOPHILS # BLD MANUAL: 0.09 THOUSAND/UL (ref 0–0.1)
BASOPHILS NFR BLD AUTO: 0 % (ref 0–1)
BASOPHILS NFR BLD AUTO: 0 % (ref 0–1)
BASOPHILS NFR BLD AUTO: 1 % (ref 0–1)
BASOPHILS NFR BLD AUTO: 2 %
BASOPHILS NFR MAR MANUAL: 0 % (ref 0–1)
BASOPHILS NFR MAR MANUAL: 0 % (ref 0–1)
BASOPHILS NFR MAR MANUAL: 1 % (ref 0–1)
BASOPHILS NFR MAR MANUAL: 2 % (ref 0–1)
BILIRUB SERPL-MCNC: 0.29 MG/DL (ref 0.2–1)
BILIRUB SERPL-MCNC: 0.34 MG/DL (ref 0.2–1)
BILIRUB SERPL-MCNC: 0.37 MG/DL (ref 0.2–1)
BILIRUB SERPL-MCNC: 0.42 MG/DL (ref 0.2–1)
BILIRUB SERPL-MCNC: 0.53 MG/DL (ref 0.2–1)
BILIRUB SERPL-MCNC: 0.59 MG/DL (ref 0.2–1)
BILIRUB SERPL-MCNC: 0.6 MG/DL (ref 0.2–1)
BILIRUB SERPL-MCNC: 0.7 MG/DL (ref 0.2–1.2)
BLASTS # BLD: 54 CELLS/UL
BLASTS NFR BLD MANUAL: 1 %
BLD GP AB SCN SERPL QL: NEGATIVE
BPU ID: NORMAL
BUN SERPL-MCNC: 14 MG/DL (ref 5–25)
BUN SERPL-MCNC: 14 MG/DL (ref 5–25)
BUN SERPL-MCNC: 15 MG/DL (ref 5–25)
BUN SERPL-MCNC: 16 MG/DL (ref 5–25)
BUN SERPL-MCNC: 16 MG/DL (ref 5–25)
BUN SERPL-MCNC: 16 MG/DL (ref 7–25)
BUN SERPL-MCNC: 17 MG/DL (ref 5–25)
BUN SERPL-MCNC: 19 MG/DL (ref 5–25)
BUN/CREAT SERPL: 28 (CALC) (ref 6–22)
CALCIUM SERPL-MCNC: 8 MG/DL (ref 8.6–10.3)
CALCIUM SERPL-MCNC: 8.3 MG/DL (ref 8.3–10.1)
CALCIUM SERPL-MCNC: 8.4 MG/DL (ref 8.3–10.1)
CALCIUM SERPL-MCNC: 8.5 MG/DL (ref 8.3–10.1)
CALCIUM SERPL-MCNC: 8.6 MG/DL (ref 8.3–10.1)
CALCIUM SERPL-MCNC: 8.7 MG/DL (ref 8.3–10.1)
CALCIUM SERPL-MCNC: 9.2 MG/DL (ref 8.3–10.1)
CALCIUM SERPL-MCNC: 9.2 MG/DL (ref 8.3–10.1)
CHLORIDE SERPL-SCNC: 102 MMOL/L (ref 98–108)
CHLORIDE SERPL-SCNC: 103 MMOL/L (ref 100–108)
CHLORIDE SERPL-SCNC: 104 MMOL/L (ref 100–108)
CHLORIDE SERPL-SCNC: 106 MMOL/L (ref 100–108)
CHLORIDE SERPL-SCNC: 107 MMOL/L (ref 98–110)
CHLORIDE SERPL-SCNC: 95 MMOL/L (ref 100–108)
CHLORIDE SERPL-SCNC: 96 MMOL/L (ref 100–108)
CHLORIDE SERPL-SCNC: 97 MMOL/L (ref 100–108)
CO2 SERPL-SCNC: 23 MMOL/L (ref 20–32)
CO2 SERPL-SCNC: 25 MMOL/L (ref 21–32)
CO2 SERPL-SCNC: 25 MMOL/L (ref 21–32)
CO2 SERPL-SCNC: 26 MMOL/L (ref 21–32)
CO2 SERPL-SCNC: 27 MMOL/L (ref 21–32)
CO2 SERPL-SCNC: 29 MMOL/L (ref 21–32)
CO2 SERPL-SCNC: 29 MMOL/L (ref 21–32)
CREAT SERPL-MCNC: 0.5 MG/DL (ref 0.6–1.3)
CREAT SERPL-MCNC: 0.51 MG/DL (ref 0.6–1.3)
CREAT SERPL-MCNC: 0.54 MG/DL (ref 0.6–1.3)
CREAT SERPL-MCNC: 0.55 MG/DL (ref 0.6–1.3)
CREAT SERPL-MCNC: 0.57 MG/DL (ref 0.6–1.3)
CREAT SERPL-MCNC: 0.58 MG/DL (ref 0.7–1.11)
CREAT SERPL-MCNC: 0.6 MG/DL (ref 0.6–1.3)
CREAT SERPL-MCNC: 0.6 MG/DL (ref 0.6–1.3)
CREAT SERPL-MCNC: 0.66 MG/DL (ref 0.6–1.3)
CREAT SERPL-MCNC: 0.67 MG/DL (ref 0.6–1.3)
CROSSMATCH: NORMAL
EOSINOPHIL # BLD AUTO: 0.04 THOUSAND/ΜL (ref 0–0.61)
EOSINOPHIL # BLD AUTO: 0.05 THOUSAND/ΜL (ref 0–0.61)
EOSINOPHIL # BLD AUTO: 0.06 THOUSAND/ΜL (ref 0–0.61)
EOSINOPHIL # BLD AUTO: 0.06 THOUSAND/ΜL (ref 0–0.61)
EOSINOPHIL # BLD AUTO: 0.1 THOUSAND/ΜL (ref 0–0.61)
EOSINOPHIL # BLD AUTO: 0.1 THOUSAND/ΜL (ref 0–0.61)
EOSINOPHIL # BLD AUTO: 216 CELLS/UL (ref 15–500)
EOSINOPHIL # BLD MANUAL: 0 THOUSAND/UL (ref 0–0.4)
EOSINOPHIL # BLD MANUAL: 0 THOUSAND/UL (ref 0–0.4)
EOSINOPHIL # BLD MANUAL: 0.14 THOUSAND/UL (ref 0–0.4)
EOSINOPHIL # BLD MANUAL: 0.22 THOUSAND/UL (ref 0–0.4)
EOSINOPHIL NFR BLD AUTO: 1 % (ref 0–6)
EOSINOPHIL NFR BLD AUTO: 1 % (ref 0–6)
EOSINOPHIL NFR BLD AUTO: 2 % (ref 0–6)
EOSINOPHIL NFR BLD AUTO: 2 % (ref 0–6)
EOSINOPHIL NFR BLD AUTO: 3 % (ref 0–6)
EOSINOPHIL NFR BLD AUTO: 3 % (ref 0–6)
EOSINOPHIL NFR BLD AUTO: 4 %
EOSINOPHIL NFR BLD MANUAL: 0 % (ref 0–6)
EOSINOPHIL NFR BLD MANUAL: 0 % (ref 0–6)
EOSINOPHIL NFR BLD MANUAL: 3 % (ref 0–6)
EOSINOPHIL NFR BLD MANUAL: 5 % (ref 0–6)
ERYTHROCYTE [DISTWIDTH] IN BLOOD BY AUTOMATED COUNT: 17.6 % (ref 11.6–15.1)
ERYTHROCYTE [DISTWIDTH] IN BLOOD BY AUTOMATED COUNT: 17.8 % (ref 11.6–15.1)
ERYTHROCYTE [DISTWIDTH] IN BLOOD BY AUTOMATED COUNT: 18.1 % (ref 11.6–15.1)
ERYTHROCYTE [DISTWIDTH] IN BLOOD BY AUTOMATED COUNT: 18.2 % (ref 11.6–15.1)
ERYTHROCYTE [DISTWIDTH] IN BLOOD BY AUTOMATED COUNT: 18.6 % (ref 11.6–15.1)
ERYTHROCYTE [DISTWIDTH] IN BLOOD BY AUTOMATED COUNT: 18.6 % (ref 11.6–15.1)
ERYTHROCYTE [DISTWIDTH] IN BLOOD BY AUTOMATED COUNT: 18.7 % (ref 11.6–15.1)
ERYTHROCYTE [DISTWIDTH] IN BLOOD BY AUTOMATED COUNT: 18.8 % (ref 11.6–15.1)
ERYTHROCYTE [DISTWIDTH] IN BLOOD BY AUTOMATED COUNT: 19.9 % (ref 11–15)
ERYTHROCYTE [DISTWIDTH] IN BLOOD BY AUTOMATED COUNT: 20.2 % (ref 11.6–15.1)
ERYTHROCYTE [DISTWIDTH] IN BLOOD BY AUTOMATED COUNT: 22.1 % (ref 11.6–15.1)
ERYTHROCYTE [DISTWIDTH] IN BLOOD BY AUTOMATED COUNT: 22.6 % (ref 11.6–15.1)
GFR SERPL CREATININE-BSD FRML MDRD: 100 ML/MIN/1.73SQ M
GFR SERPL CREATININE-BSD FRML MDRD: 89 ML/MIN/1.73SQ M
GFR SERPL CREATININE-BSD FRML MDRD: 89 ML/MIN/1.73SQ M
GFR SERPL CREATININE-BSD FRML MDRD: 93 ML/MIN/1.73SQ M
GFR SERPL CREATININE-BSD FRML MDRD: 93 ML/MIN/1.73SQ M
GFR SERPL CREATININE-BSD FRML MDRD: 95 ML/MIN/1.73SQ M
GFR SERPL CREATININE-BSD FRML MDRD: 96 ML/MIN/1.73SQ M
GFR SERPL CREATININE-BSD FRML MDRD: 97 ML/MIN/1.73SQ M
GFR SERPL CREATININE-BSD FRML MDRD: 99 ML/MIN/1.73SQ M
GLOBULIN SER CALC-MCNC: 2.6 G/DL (CALC) (ref 1.9–3.7)
GLUCOSE P FAST SERPL-MCNC: 124 MG/DL (ref 65–99)
GLUCOSE P FAST SERPL-MCNC: 130 MG/DL (ref 65–99)
GLUCOSE SERPL-MCNC: 112 MG/DL (ref 65–140)
GLUCOSE SERPL-MCNC: 119 MG/DL (ref 65–140)
GLUCOSE SERPL-MCNC: 121 MG/DL (ref 65–140)
GLUCOSE SERPL-MCNC: 121 MG/DL (ref 65–140)
GLUCOSE SERPL-MCNC: 123 MG/DL (ref 65–140)
GLUCOSE SERPL-MCNC: 124 MG/DL (ref 65–99)
GLUCOSE SERPL-MCNC: 128 MG/DL (ref 65–140)
GLUCOSE SERPL-MCNC: 130 MG/DL (ref 65–140)
HCT VFR BLD AUTO: 22.5 % (ref 36.5–49.3)
HCT VFR BLD AUTO: 22.7 % (ref 36.5–49.3)
HCT VFR BLD AUTO: 23 % (ref 36.5–49.3)
HCT VFR BLD AUTO: 23 % (ref 38.5–50)
HCT VFR BLD AUTO: 23.8 % (ref 36.5–49.3)
HCT VFR BLD AUTO: 25.7 % (ref 36.5–49.3)
HCT VFR BLD AUTO: 26.1 % (ref 36.5–49.3)
HCT VFR BLD AUTO: 26.4 % (ref 36.5–49.3)
HCT VFR BLD AUTO: 27.5 % (ref 36.5–49.3)
HCT VFR BLD AUTO: 27.7 % (ref 36.5–49.3)
HCT VFR BLD AUTO: 28.7 % (ref 36.5–49.3)
HCT VFR BLD AUTO: 30.8 % (ref 36.5–49.3)
HGB BLD-MCNC: 6.8 G/DL (ref 12–17)
HGB BLD-MCNC: 7 G/DL (ref 12–17)
HGB BLD-MCNC: 7.2 G/DL (ref 12–17)
HGB BLD-MCNC: 7.5 G/DL (ref 12–17)
HGB BLD-MCNC: 7.6 G/DL (ref 13.2–17.1)
HGB BLD-MCNC: 7.7 G/DL (ref 12–17)
HGB BLD-MCNC: 7.8 G/DL (ref 12–17)
HGB BLD-MCNC: 7.9 G/DL (ref 12–17)
HGB BLD-MCNC: 8.5 G/DL (ref 12–17)
HGB BLD-MCNC: 8.5 G/DL (ref 12–17)
HGB BLD-MCNC: 8.7 G/DL (ref 12–17)
HGB BLD-MCNC: 9.4 G/DL (ref 12–17)
IMM GRANULOCYTES # BLD AUTO: 0.02 THOUSAND/UL (ref 0–0.2)
IMM GRANULOCYTES # BLD AUTO: 0.02 THOUSAND/UL (ref 0–0.2)
IMM GRANULOCYTES # BLD AUTO: 0.03 THOUSAND/UL (ref 0–0.2)
IMM GRANULOCYTES NFR BLD AUTO: 1 % (ref 0–2)
INR PPP: 1.11 (ref 0.84–1.19)
LYMPHOCYTES # BLD AUTO: 0.63 THOUSANDS/ΜL (ref 0.6–4.47)
LYMPHOCYTES # BLD AUTO: 0.65 THOUSAND/UL (ref 0.6–4.47)
LYMPHOCYTES # BLD AUTO: 0.71 THOUSAND/UL (ref 0.6–4.47)
LYMPHOCYTES # BLD AUTO: 0.71 THOUSAND/UL (ref 0.6–4.47)
LYMPHOCYTES # BLD AUTO: 0.75 THOUSANDS/ΜL (ref 0.6–4.47)
LYMPHOCYTES # BLD AUTO: 0.75 THOUSANDS/ΜL (ref 0.6–4.47)
LYMPHOCYTES # BLD AUTO: 0.76 THOUSANDS/ΜL (ref 0.6–4.47)
LYMPHOCYTES # BLD AUTO: 0.83 THOUSAND/UL (ref 0.6–4.47)
LYMPHOCYTES # BLD AUTO: 0.9 THOUSANDS/ΜL (ref 0.6–4.47)
LYMPHOCYTES # BLD AUTO: 0.91 THOUSANDS/ΜL (ref 0.6–4.47)
LYMPHOCYTES # BLD AUTO: 1242 CELLS/UL (ref 850–3900)
LYMPHOCYTES # BLD AUTO: 15 % (ref 14–44)
LYMPHOCYTES # BLD AUTO: 16 % (ref 14–44)
LYMPHOCYTES # BLD AUTO: 18 % (ref 14–44)
LYMPHOCYTES # BLD AUTO: 23 % (ref 14–44)
LYMPHOCYTES NFR BLD AUTO: 17 % (ref 14–44)
LYMPHOCYTES NFR BLD AUTO: 20 % (ref 14–44)
LYMPHOCYTES NFR BLD AUTO: 21 % (ref 14–44)
LYMPHOCYTES NFR BLD AUTO: 22 % (ref 14–44)
LYMPHOCYTES NFR BLD AUTO: 22 % (ref 14–44)
LYMPHOCYTES NFR BLD AUTO: 23 %
LYMPHOCYTES NFR BLD AUTO: 25 % (ref 14–44)
MACROCYTES BLD QL AUTO: PRESENT
MAGNESIUM SERPL-MCNC: 2.3 MG/DL (ref 1.6–2.6)
MCH RBC QN AUTO: 31 PG (ref 26.8–34.3)
MCH RBC QN AUTO: 31.6 PG (ref 26.8–34.3)
MCH RBC QN AUTO: 31.6 PG (ref 26.8–34.3)
MCH RBC QN AUTO: 31.7 PG (ref 26.8–34.3)
MCH RBC QN AUTO: 31.9 PG (ref 26.8–34.3)
MCH RBC QN AUTO: 31.9 PG (ref 26.8–34.3)
MCH RBC QN AUTO: 31.9 PG (ref 27–33)
MCH RBC QN AUTO: 32.1 PG (ref 26.8–34.3)
MCH RBC QN AUTO: 32.2 PG (ref 26.8–34.3)
MCH RBC QN AUTO: 32.4 PG (ref 26.8–34.3)
MCH RBC QN AUTO: 32.6 PG (ref 26.8–34.3)
MCH RBC QN AUTO: 32.9 PG (ref 26.8–34.3)
MCHC RBC AUTO-ENTMCNC: 29.5 G/DL (ref 31.4–37.4)
MCHC RBC AUTO-ENTMCNC: 30 G/DL (ref 31.4–37.4)
MCHC RBC AUTO-ENTMCNC: 30.2 G/DL (ref 31.4–37.4)
MCHC RBC AUTO-ENTMCNC: 30.3 G/DL (ref 31.4–37.4)
MCHC RBC AUTO-ENTMCNC: 30.3 G/DL (ref 31.4–37.4)
MCHC RBC AUTO-ENTMCNC: 30.5 G/DL (ref 31.4–37.4)
MCHC RBC AUTO-ENTMCNC: 30.7 G/DL (ref 31.4–37.4)
MCHC RBC AUTO-ENTMCNC: 30.8 G/DL (ref 31.4–37.4)
MCHC RBC AUTO-ENTMCNC: 30.9 G/DL (ref 31.4–37.4)
MCHC RBC AUTO-ENTMCNC: 31.3 G/DL (ref 31.4–37.4)
MCHC RBC AUTO-ENTMCNC: 31.5 G/DL (ref 31.4–37.4)
MCHC RBC AUTO-ENTMCNC: 33 G/DL (ref 32–36)
MCV RBC AUTO: 102 FL (ref 82–98)
MCV RBC AUTO: 103 FL (ref 82–98)
MCV RBC AUTO: 104 FL (ref 82–98)
MCV RBC AUTO: 105 FL (ref 82–98)
MCV RBC AUTO: 106 FL (ref 82–98)
MCV RBC AUTO: 107 FL (ref 82–98)
MCV RBC AUTO: 108 FL (ref 82–98)
MCV RBC AUTO: 96.6 FL (ref 80–100)
METAMYELOCYTES # BLD: 486 CELLS/UL
METAMYELOCYTES NFR BLD MANUAL: 2 % (ref 0–1)
METAMYELOCYTES NFR BLD MANUAL: 3 % (ref 0–1)
METAMYELOCYTES NFR BLD MANUAL: 9 %
MICROCYTES BLD QL AUTO: PRESENT
MONOCYTES # BLD AUTO: 0.06 THOUSAND/UL (ref 0–1.22)
MONOCYTES # BLD AUTO: 0.13 THOUSAND/UL (ref 0–1.22)
MONOCYTES # BLD AUTO: 0.33 THOUSAND/ΜL (ref 0.17–1.22)
MONOCYTES # BLD AUTO: 0.35 THOUSAND/ΜL (ref 0.17–1.22)
MONOCYTES # BLD AUTO: 0.37 THOUSAND/ΜL (ref 0.17–1.22)
MONOCYTES # BLD AUTO: 0.38 THOUSAND/ΜL (ref 0.17–1.22)
MONOCYTES # BLD AUTO: 0.42 THOUSAND/ΜL (ref 0.17–1.22)
MONOCYTES # BLD AUTO: 0.43 THOUSAND/ΜL (ref 0.17–1.22)
MONOCYTES # BLD AUTO: 0.44 THOUSAND/UL (ref 0–1.22)
MONOCYTES # BLD AUTO: 0.51 THOUSAND/UL (ref 0–1.22)
MONOCYTES # BLD AUTO: 540 CELLS/UL (ref 200–950)
MONOCYTES NFR BLD AUTO: 10 %
MONOCYTES NFR BLD AUTO: 10 % (ref 4–12)
MONOCYTES NFR BLD AUTO: 10 % (ref 4–12)
MONOCYTES NFR BLD AUTO: 11 % (ref 4–12)
MONOCYTES NFR BLD AUTO: 9 % (ref 4–12)
MONOCYTES NFR BLD: 10 % (ref 4–12)
MONOCYTES NFR BLD: 11 % (ref 4–12)
MONOCYTES NFR BLD: 2 % (ref 4–12)
MONOCYTES NFR BLD: 3 % (ref 4–12)
MYELOCYTES # BLD: 324 CELLS/UL
MYELOCYTES NFR BLD MANUAL: 3 % (ref 0–1)
MYELOCYTES NFR BLD MANUAL: 5 % (ref 0–1)
MYELOCYTES NFR BLD MANUAL: 6 %
NEUTROPHILS # BLD AUTO: 1458 CELLS/UL (ref 1500–7800)
NEUTROPHILS # BLD AUTO: 2.22 THOUSANDS/ΜL (ref 1.85–7.62)
NEUTROPHILS # BLD AUTO: 2.23 THOUSANDS/ΜL (ref 1.85–7.62)
NEUTROPHILS # BLD AUTO: 2.35 THOUSANDS/ΜL (ref 1.85–7.62)
NEUTROPHILS # BLD AUTO: 2.59 THOUSANDS/ΜL (ref 1.85–7.62)
NEUTROPHILS # BLD AUTO: 2.65 THOUSANDS/ΜL (ref 1.85–7.62)
NEUTROPHILS # BLD AUTO: 2.68 THOUSANDS/ΜL (ref 1.85–7.62)
NEUTROPHILS # BLD MANUAL: 2.28 THOUSAND/UL (ref 1.85–7.62)
NEUTROPHILS # BLD MANUAL: 2.66 THOUSAND/UL (ref 1.85–7.62)
NEUTROPHILS # BLD MANUAL: 2.86 THOUSAND/UL (ref 1.85–7.62)
NEUTROPHILS # BLD MANUAL: 3.55 THOUSAND/UL (ref 1.85–7.62)
NEUTROPHILS NFR BLD AUTO: 27 %
NEUTS BAND # BLD: 918 CELLS/UL (ref 0–750)
NEUTS BAND NFR BLD MANUAL: 13 % (ref 0–8)
NEUTS BAND NFR BLD MANUAL: 17 %
NEUTS BAND NFR BLD MANUAL: 2 % (ref 0–8)
NEUTS BAND NFR BLD MANUAL: 7 % (ref 0–8)
NEUTS SEG NFR BLD AUTO: 47 % (ref 43–75)
NEUTS SEG NFR BLD AUTO: 55 % (ref 43–75)
NEUTS SEG NFR BLD AUTO: 61 % (ref 43–75)
NEUTS SEG NFR BLD AUTO: 64 % (ref 43–75)
NEUTS SEG NFR BLD AUTO: 66 % (ref 43–75)
NEUTS SEG NFR BLD AUTO: 71 % (ref 43–75)
NEUTS SEG NFR BLD AUTO: 72 % (ref 43–75)
NEUTS SEG NFR BLD AUTO: 82 % (ref 43–75)
NRBC BLD AUTO-RTO: 0 /100 WBCS
NRBC BLD AUTO-RTO: 1 /100 WBC (ref 0–2)
NRBC BLD AUTO-RTO: 1 /100 WBCS
NRBC BLD AUTO-RTO: 16 /100 WBCS
NRBC BLD AUTO-RTO: 19 /100 WBC (ref 0–2)
NRBC BLD AUTO-RTO: 6 /100 WBC (ref 0–2)
NRBC BLD AUTO-RTO: 8 /100 WBCS
NRBC BLD-RTO: 17 /100 WBC
P AXIS: 100 DEGREES
PLATELET # BLD AUTO: 103 THOUSANDS/UL (ref 149–390)
PLATELET # BLD AUTO: 106 THOUSANDS/UL (ref 149–390)
PLATELET # BLD AUTO: 107 THOUSANDS/UL (ref 149–390)
PLATELET # BLD AUTO: 110 THOUSANDS/UL (ref 149–390)
PLATELET # BLD AUTO: 120 THOUSANDS/UL (ref 149–390)
PLATELET # BLD AUTO: 121 THOUSANDS/UL (ref 149–390)
PLATELET # BLD AUTO: 135 THOUSANDS/UL (ref 149–390)
PLATELET # BLD AUTO: 21 THOUSAND/UL (ref 140–400)
PLATELET # BLD AUTO: 23 THOUSANDS/UL (ref 149–390)
PLATELET # BLD AUTO: 40 THOUSANDS/UL (ref 149–390)
PLATELET # BLD AUTO: 80 THOUSANDS/UL (ref 149–390)
PLATELET # BLD AUTO: 99 THOUSANDS/UL (ref 149–390)
PLATELET BLD QL SMEAR: ABNORMAL
PMV BLD AUTO: 10.2 FL (ref 8.9–12.7)
PMV BLD AUTO: 10.4 FL (ref 8.9–12.7)
PMV BLD AUTO: 8.4 FL (ref 8.9–12.7)
PMV BLD AUTO: 8.7 FL (ref 8.9–12.7)
PMV BLD AUTO: 8.9 FL (ref 8.9–12.7)
PMV BLD AUTO: 9 FL (ref 8.9–12.7)
PMV BLD AUTO: 9.1 FL (ref 8.9–12.7)
PMV BLD AUTO: 9.3 FL (ref 8.9–12.7)
PMV BLD AUTO: 9.5 FL (ref 8.9–12.7)
PMV BLD AUTO: 9.6 FL (ref 8.9–12.7)
PMV BLD AUTO: 9.8 FL (ref 8.9–12.7)
PMV BLD REES-ECKER: 9.9 FL (ref 7.5–12.5)
POIKILOCYTOSIS BLD QL SMEAR: PRESENT
POLYCHROMASIA BLD QL SMEAR: PRESENT
POLYCHROMASIA BLD QL SMEAR: PRESENT
POTASSIUM SERPL-SCNC: 3.8 MMOL/L (ref 3.5–5.3)
POTASSIUM SERPL-SCNC: 3.9 MMOL/L (ref 3.5–5.3)
POTASSIUM SERPL-SCNC: 3.9 MMOL/L (ref 3.5–5.3)
POTASSIUM SERPL-SCNC: 4 MMOL/L (ref 3.5–5.3)
POTASSIUM SERPL-SCNC: 4.1 MMOL/L (ref 3.5–5.3)
POTASSIUM SERPL-SCNC: 4.4 MMOL/L (ref 3.5–5.3)
POTASSIUM SERPL-SCNC: 4.6 MMOL/L (ref 3.5–5.3)
PR INTERVAL: 186 MS
PROMYELOCYTES # BLD: 54 CELLS/UL
PROMYELOCYTES NFR BLD MANUAL: 1 %
PROT SERPL-MCNC: 6.3 G/DL (ref 6.1–8.1)
PROT SERPL-MCNC: 6.8 G/DL (ref 6.4–8.2)
PROT SERPL-MCNC: 6.8 G/DL (ref 6.4–8.2)
PROT SERPL-MCNC: 7 G/DL (ref 6.4–8.2)
PROT SERPL-MCNC: 7.1 G/DL (ref 6.4–8.2)
PROT SERPL-MCNC: 7.1 G/DL (ref 6.4–8.2)
PROT SERPL-MCNC: 7.3 G/DL (ref 6.4–8.2)
PROT SERPL-MCNC: 7.4 G/DL (ref 6.4–8.2)
PROTHROMBIN TIME: 14.4 SECONDS (ref 11.6–14.5)
PSA SERPL-MCNC: 236.6 NG/ML
PSA SERPL-MCNC: 240.3 NG/ML (ref 0–4)
PSA SERPL-MCNC: 258.1 NG/ML (ref 0–4)
PSA SERPL-MCNC: 285.9 NG/ML (ref 0–4)
QRS AXIS: 18 DEGREES
QRSD INTERVAL: 72 MS
QT INTERVAL: 350 MS
QTC INTERVAL: 416 MS
RBC # BLD AUTO: 2.13 MILLION/UL (ref 3.88–5.62)
RBC # BLD AUTO: 2.16 MILLION/UL (ref 3.88–5.62)
RBC # BLD AUTO: 2.21 MILLION/UL (ref 3.88–5.62)
RBC # BLD AUTO: 2.28 MILLION/UL (ref 3.88–5.62)
RBC # BLD AUTO: 2.38 MILLION/UL (ref 4.2–5.8)
RBC # BLD AUTO: 2.39 MILLION/UL (ref 3.88–5.62)
RBC # BLD AUTO: 2.47 MILLION/UL (ref 3.88–5.62)
RBC # BLD AUTO: 2.5 MILLION/UL (ref 3.88–5.62)
RBC # BLD AUTO: 2.65 MILLION/UL (ref 3.88–5.62)
RBC # BLD AUTO: 2.68 MILLION/UL (ref 3.88–5.62)
RBC # BLD AUTO: 2.73 MILLION/UL (ref 3.88–5.62)
RBC # BLD AUTO: 3.03 MILLION/UL (ref 3.88–5.62)
RBC MORPH BLD: NORMAL
RBC MORPH BLD: PRESENT
RH BLD: POSITIVE
SL AMB EGFR AFRICAN AMERICAN: 110 ML/MIN/1.73M2
SL AMB EGFR NON AFRICAN AMERICAN: 95 ML/MIN/1.73M2
SODIUM SERPL-SCNC: 129 MMOL/L (ref 136–145)
SODIUM SERPL-SCNC: 129 MMOL/L (ref 136–145)
SODIUM SERPL-SCNC: 131 MMOL/L (ref 136–145)
SODIUM SERPL-SCNC: 135 MMOL/L (ref 136–145)
SODIUM SERPL-SCNC: 136 MMOL/L (ref 136–145)
SODIUM SERPL-SCNC: 138 MMOL/L (ref 135–146)
SODIUM SERPL-SCNC: 138 MMOL/L (ref 136–145)
SODIUM SERPL-SCNC: 138 MMOL/L (ref 136–145)
SODIUM SERPL-SCNC: 139 MMOL/L (ref 136–145)
SODIUM SERPL-SCNC: 139 MMOL/L (ref 136–145)
SPECIMEN EXPIRATION DATE: NORMAL
T WAVE AXIS: 116 DEGREES
T4 FREE SERPL-MCNC: 0.93 NG/DL (ref 0.76–1.46)
TOTAL CELLS COUNTED SPEC: 100
TROPONIN I SERPL-MCNC: <0.02 NG/ML
TSH SERPL DL<=0.05 MIU/L-ACNC: 4.18 UIU/ML (ref 0.36–3.74)
TSH SERPL DL<=0.05 MIU/L-ACNC: 5.71 UIU/ML (ref 0.36–3.74)
TSH SERPL DL<=0.05 MIU/L-ACNC: 7.47 UIU/ML (ref 0.36–3.74)
TSH SERPL DL<=0.05 MIU/L-ACNC: 8.81 UIU/ML (ref 0.36–3.74)
TSH SERPL DL<=0.05 MIU/L-ACNC: 9.73 UIU/ML (ref 0.36–3.74)
UNIT DISPENSE STATUS: NORMAL
UNIT PRODUCT CODE: NORMAL
UNIT RH: NORMAL
VENIPUNCTURE: NORMAL
VENIPUNCTURE: NORMAL
VENTRICULAR RATE: 85 BPM
WBC # BLD AUTO: 3.08 THOUSAND/UL (ref 4.31–10.16)
WBC # BLD AUTO: 3.45 THOUSAND/UL (ref 4.31–10.16)
WBC # BLD AUTO: 3.6 THOUSAND/UL (ref 4.31–10.16)
WBC # BLD AUTO: 3.61 THOUSAND/UL (ref 4.31–10.16)
WBC # BLD AUTO: 3.69 THOUSAND/UL (ref 4.31–10.16)
WBC # BLD AUTO: 3.71 THOUSAND/UL (ref 4.31–10.16)
WBC # BLD AUTO: 3.87 THOUSAND/UL (ref 4.31–10.16)
WBC # BLD AUTO: 4.1 THOUSAND/UL (ref 4.31–10.16)
WBC # BLD AUTO: 4.33 THOUSAND/UL (ref 4.31–10.16)
WBC # BLD AUTO: 4.43 THOUSAND/UL (ref 4.31–10.16)
WBC # BLD AUTO: 4.62 THOUSAND/UL (ref 4.31–10.16)
WBC # BLD AUTO: 5.4 THOUSAND/UL (ref 3.8–10.8)

## 2019-01-01 PROCEDURE — 84443 ASSAY THYROID STIM HORMONE: CPT

## 2019-01-01 PROCEDURE — 85025 COMPLETE CBC W/AUTO DIFF WBC: CPT

## 2019-01-01 PROCEDURE — 36415 COLL VENOUS BLD VENIPUNCTURE: CPT

## 2019-01-01 PROCEDURE — 86900 BLOOD TYPING SEROLOGIC ABO: CPT

## 2019-01-01 PROCEDURE — 99214 OFFICE O/P EST MOD 30 MIN: CPT | Performed by: FAMILY MEDICINE

## 2019-01-01 PROCEDURE — P9016 RBC LEUKOCYTES REDUCED: HCPCS

## 2019-01-01 PROCEDURE — P9040 RBC LEUKOREDUCED IRRADIATED: HCPCS

## 2019-01-01 PROCEDURE — 86923 COMPATIBILITY TEST ELECTRIC: CPT

## 2019-01-01 PROCEDURE — 85027 COMPLETE CBC AUTOMATED: CPT

## 2019-01-01 PROCEDURE — 86901 BLOOD TYPING SEROLOGIC RH(D): CPT

## 2019-01-01 PROCEDURE — 36415 COLL VENOUS BLD VENIPUNCTURE: CPT | Performed by: INTERNAL MEDICINE

## 2019-01-01 PROCEDURE — 86901 BLOOD TYPING SEROLOGIC RH(D): CPT | Performed by: INTERNAL MEDICINE

## 2019-01-01 PROCEDURE — 99214 OFFICE O/P EST MOD 30 MIN: CPT | Performed by: INTERNAL MEDICINE

## 2019-01-01 PROCEDURE — 85025 COMPLETE CBC W/AUTO DIFF WBC: CPT | Performed by: EMERGENCY MEDICINE

## 2019-01-01 PROCEDURE — 85027 COMPLETE CBC AUTOMATED: CPT | Performed by: PHYSICIAN ASSISTANT

## 2019-01-01 PROCEDURE — 93005 ELECTROCARDIOGRAM TRACING: CPT

## 2019-01-01 PROCEDURE — 85027 COMPLETE CBC AUTOMATED: CPT | Performed by: INTERNAL MEDICINE

## 2019-01-01 PROCEDURE — 86850 RBC ANTIBODY SCREEN: CPT | Performed by: INTERNAL MEDICINE

## 2019-01-01 PROCEDURE — 86850 RBC ANTIBODY SCREEN: CPT

## 2019-01-01 PROCEDURE — 97163 PT EVAL HIGH COMPLEX 45 MIN: CPT

## 2019-01-01 PROCEDURE — 80048 BASIC METABOLIC PNL TOTAL CA: CPT | Performed by: PHYSICIAN ASSISTANT

## 2019-01-01 PROCEDURE — 70551 MRI BRAIN STEM W/O DYE: CPT

## 2019-01-01 PROCEDURE — 72132 CT LUMBAR SPINE W/DYE: CPT

## 2019-01-01 PROCEDURE — 96401 CHEMO ANTI-NEOPL SQ/IM: CPT

## 2019-01-01 PROCEDURE — 96374 THER/PROPH/DIAG INJ IV PUSH: CPT

## 2019-01-01 PROCEDURE — 96365 THER/PROPH/DIAG IV INF INIT: CPT

## 2019-01-01 PROCEDURE — 80053 COMPREHEN METABOLIC PANEL: CPT | Performed by: INTERNAL MEDICINE

## 2019-01-01 PROCEDURE — 86900 BLOOD TYPING SEROLOGIC ABO: CPT | Performed by: EMERGENCY MEDICINE

## 2019-01-01 PROCEDURE — 90662 IIV NO PRSV INCREASED AG IM: CPT | Performed by: FAMILY MEDICINE

## 2019-01-01 PROCEDURE — G0008 ADMIN INFLUENZA VIRUS VAC: HCPCS | Performed by: FAMILY MEDICINE

## 2019-01-01 PROCEDURE — 99232 SBSQ HOSP IP/OBS MODERATE 35: CPT | Performed by: INTERNAL MEDICINE

## 2019-01-01 PROCEDURE — P9037 PLATE PHERES LEUKOREDU IRRAD: HCPCS

## 2019-01-01 PROCEDURE — 85610 PROTHROMBIN TIME: CPT | Performed by: EMERGENCY MEDICINE

## 2019-01-01 PROCEDURE — 1124F ACP DISCUSS-NO DSCNMKR DOCD: CPT | Performed by: FAMILY MEDICINE

## 2019-01-01 PROCEDURE — 36430 TRANSFUSION BLD/BLD COMPNT: CPT

## 2019-01-01 PROCEDURE — 36415 COLL VENOUS BLD VENIPUNCTURE: CPT | Performed by: PHYSICIAN ASSISTANT

## 2019-01-01 PROCEDURE — 85025 COMPLETE CBC W/AUTO DIFF WBC: CPT | Performed by: INTERNAL MEDICINE

## 2019-01-01 PROCEDURE — 99205 OFFICE O/P NEW HI 60 MIN: CPT | Performed by: NURSE PRACTITIONER

## 2019-01-01 PROCEDURE — 96402 CHEMO HORMON ANTINEOPL SQ/IM: CPT | Performed by: PHYSICIAN ASSISTANT

## 2019-01-01 PROCEDURE — 85007 BL SMEAR W/DIFF WBC COUNT: CPT | Performed by: PHYSICIAN ASSISTANT

## 2019-01-01 PROCEDURE — 99285 EMERGENCY DEPT VISIT HI MDM: CPT

## 2019-01-01 PROCEDURE — 86901 BLOOD TYPING SEROLOGIC RH(D): CPT | Performed by: EMERGENCY MEDICINE

## 2019-01-01 PROCEDURE — 99213 OFFICE O/P EST LOW 20 MIN: CPT | Performed by: PHYSICIAN ASSISTANT

## 2019-01-01 PROCEDURE — 80053 COMPREHEN METABOLIC PANEL: CPT

## 2019-01-01 PROCEDURE — 93010 ELECTROCARDIOGRAM REPORT: CPT | Performed by: INTERNAL MEDICINE

## 2019-01-01 PROCEDURE — 84153 ASSAY OF PSA TOTAL: CPT | Performed by: INTERNAL MEDICINE

## 2019-01-01 PROCEDURE — 84153 ASSAY OF PSA TOTAL: CPT | Performed by: PHYSICIAN ASSISTANT

## 2019-01-01 PROCEDURE — 85730 THROMBOPLASTIN TIME PARTIAL: CPT | Performed by: EMERGENCY MEDICINE

## 2019-01-01 PROCEDURE — 71046 X-RAY EXAM CHEST 2 VIEWS: CPT

## 2019-01-01 PROCEDURE — 80048 BASIC METABOLIC PNL TOTAL CA: CPT | Performed by: INTERNAL MEDICINE

## 2019-01-01 PROCEDURE — 85007 BL SMEAR W/DIFF WBC COUNT: CPT | Performed by: INTERNAL MEDICINE

## 2019-01-01 PROCEDURE — 84484 ASSAY OF TROPONIN QUANT: CPT | Performed by: EMERGENCY MEDICINE

## 2019-01-01 PROCEDURE — 99223 1ST HOSP IP/OBS HIGH 75: CPT | Performed by: INTERNAL MEDICINE

## 2019-01-01 PROCEDURE — G8988 SELF CARE GOAL STATUS: HCPCS

## 2019-01-01 PROCEDURE — G8979 MOBILITY GOAL STATUS: HCPCS

## 2019-01-01 PROCEDURE — 72125 CT NECK SPINE W/O DYE: CPT

## 2019-01-01 PROCEDURE — G8978 MOBILITY CURRENT STATUS: HCPCS

## 2019-01-01 PROCEDURE — 30233N1 TRANSFUSION OF NONAUTOLOGOUS RED BLOOD CELLS INTO PERIPHERAL VEIN, PERCUTANEOUS APPROACH: ICD-10-PCS | Performed by: INTERNAL MEDICINE

## 2019-01-01 PROCEDURE — 85007 BL SMEAR W/DIFF WBC COUNT: CPT

## 2019-01-01 PROCEDURE — 86850 RBC ANTIBODY SCREEN: CPT | Performed by: EMERGENCY MEDICINE

## 2019-01-01 PROCEDURE — 97166 OT EVAL MOD COMPLEX 45 MIN: CPT

## 2019-01-01 PROCEDURE — 99285 EMERGENCY DEPT VISIT HI MDM: CPT | Performed by: EMERGENCY MEDICINE

## 2019-01-01 PROCEDURE — 99497 ADVNCD CARE PLAN 30 MIN: CPT | Performed by: NURSE PRACTITIONER

## 2019-01-01 PROCEDURE — 83735 ASSAY OF MAGNESIUM: CPT | Performed by: EMERGENCY MEDICINE

## 2019-01-01 PROCEDURE — G8987 SELF CARE CURRENT STATUS: HCPCS

## 2019-01-01 PROCEDURE — 80053 COMPREHEN METABOLIC PANEL: CPT | Performed by: PHYSICIAN ASSISTANT

## 2019-01-01 PROCEDURE — 70450 CT HEAD/BRAIN W/O DYE: CPT

## 2019-01-01 PROCEDURE — 99215 OFFICE O/P EST HI 40 MIN: CPT | Performed by: PHYSICIAN ASSISTANT

## 2019-01-01 PROCEDURE — 80053 COMPREHEN METABOLIC PANEL: CPT | Performed by: EMERGENCY MEDICINE

## 2019-01-01 PROCEDURE — 84439 ASSAY OF FREE THYROXINE: CPT

## 2019-01-01 PROCEDURE — 99214 OFFICE O/P EST MOD 30 MIN: CPT | Performed by: PHYSICIAN ASSISTANT

## 2019-01-01 PROCEDURE — 86900 BLOOD TYPING SEROLOGIC ABO: CPT | Performed by: INTERNAL MEDICINE

## 2019-01-01 PROCEDURE — G0439 PPPS, SUBSEQ VISIT: HCPCS | Performed by: FAMILY MEDICINE

## 2019-01-01 PROCEDURE — 99239 HOSP IP/OBS DSCHRG MGMT >30: CPT | Performed by: INTERNAL MEDICINE

## 2019-01-01 RX ORDER — ACETAMINOPHEN 325 MG/1
650 TABLET ORAL ONCE
Status: COMPLETED | OUTPATIENT
Start: 2019-01-01 | End: 2019-01-01

## 2019-01-01 RX ORDER — SODIUM CHLORIDE 9 MG/ML
20 INJECTION, SOLUTION INTRAVENOUS ONCE
Status: CANCELLED | OUTPATIENT
Start: 2019-01-01

## 2019-01-01 RX ORDER — ACETAMINOPHEN 325 MG/1
650 TABLET ORAL ONCE
Status: CANCELLED | OUTPATIENT
Start: 2019-01-01

## 2019-01-01 RX ORDER — SODIUM CHLORIDE 1000 MG
1 TABLET, SOLUBLE MISCELLANEOUS
Status: DISCONTINUED | OUTPATIENT
Start: 2019-01-01 | End: 2019-01-01 | Stop reason: HOSPADM

## 2019-01-01 RX ORDER — LEVOTHYROXINE SODIUM 0.07 MG/1
75 TABLET ORAL DAILY
Status: DISCONTINUED | OUTPATIENT
Start: 2019-01-01 | End: 2019-01-01 | Stop reason: HOSPADM

## 2019-01-01 RX ORDER — LANOLIN ALCOHOL/MO/W.PET/CERES
6 CREAM (GRAM) TOPICAL
Status: DISCONTINUED | OUTPATIENT
Start: 2019-01-01 | End: 2019-01-01 | Stop reason: HOSPADM

## 2019-01-01 RX ORDER — LEVOTHYROXINE SODIUM 0.05 MG/1
50 TABLET ORAL DAILY
Qty: 90 TABLET | Refills: 3 | Status: SHIPPED | OUTPATIENT
Start: 2019-01-01 | End: 2019-01-01 | Stop reason: SDUPTHER

## 2019-01-01 RX ORDER — SODIUM CHLORIDE 9 MG/ML
20 INJECTION, SOLUTION INTRAVENOUS ONCE
Status: COMPLETED | OUTPATIENT
Start: 2019-01-01 | End: 2019-01-01

## 2019-01-01 RX ORDER — SODIUM CHLORIDE 9 MG/ML
50 INJECTION, SOLUTION INTRAVENOUS CONTINUOUS
Status: DISCONTINUED | OUTPATIENT
Start: 2019-01-01 | End: 2019-01-01

## 2019-01-01 RX ORDER — TRAVOPROST OPHTHALMIC SOLUTION 0.04 MG/ML
1 SOLUTION OPHTHALMIC
Status: DISCONTINUED | OUTPATIENT
Start: 2019-01-01 | End: 2019-01-01 | Stop reason: HOSPADM

## 2019-01-01 RX ORDER — SODIUM CHLORIDE 1000 MG
1 TABLET, SOLUBLE MISCELLANEOUS
Qty: 90 TABLET | Refills: 0 | Status: SHIPPED | OUTPATIENT
Start: 2019-01-01

## 2019-01-01 RX ORDER — DEXAMETHASONE 4 MG/1
4 TABLET ORAL
Qty: 30 TABLET | Refills: 0 | Status: SHIPPED | OUTPATIENT
Start: 2019-01-01

## 2019-01-01 RX ORDER — OXYCODONE HYDROCHLORIDE 5 MG/1
5 TABLET ORAL EVERY 4 HOURS PRN
Qty: 30 TABLET | Refills: 0 | Status: SHIPPED | OUTPATIENT
Start: 2019-01-01 | End: 2019-01-01 | Stop reason: SDUPTHER

## 2019-01-01 RX ORDER — LORAZEPAM 1 MG/1
0.5 TABLET ORAL ONCE AS NEEDED
Status: COMPLETED | OUTPATIENT
Start: 2019-01-01 | End: 2019-01-01

## 2019-01-01 RX ORDER — SODIUM CHLORIDE 9 MG/ML
20 INJECTION, SOLUTION INTRAVENOUS CONTINUOUS
Status: DISCONTINUED | OUTPATIENT
Start: 2019-01-01 | End: 2019-01-01 | Stop reason: HOSPADM

## 2019-01-01 RX ORDER — DORZOLAMIDE HCL 20 MG/ML
1 SOLUTION/ DROPS OPHTHALMIC 3 TIMES DAILY
Status: DISCONTINUED | OUTPATIENT
Start: 2019-01-01 | End: 2019-01-01 | Stop reason: HOSPADM

## 2019-01-01 RX ORDER — SODIUM CHLORIDE 9 MG/ML
20 INJECTION, SOLUTION INTRAVENOUS ONCE
Status: DISCONTINUED | OUTPATIENT
Start: 2019-01-01 | End: 2019-01-01 | Stop reason: HOSPADM

## 2019-01-01 RX ORDER — ESCITALOPRAM OXALATE 10 MG/1
10 TABLET ORAL DAILY
Qty: 30 TABLET | Refills: 5 | Status: SHIPPED | OUTPATIENT
Start: 2019-01-01 | End: 2019-01-01

## 2019-01-01 RX ORDER — ONDANSETRON 2 MG/ML
4 INJECTION INTRAMUSCULAR; INTRAVENOUS EVERY 6 HOURS PRN
Status: DISCONTINUED | OUTPATIENT
Start: 2019-01-01 | End: 2019-01-01 | Stop reason: HOSPADM

## 2019-01-01 RX ORDER — HEPARIN SODIUM 5000 [USP'U]/ML
5000 INJECTION, SOLUTION INTRAVENOUS; SUBCUTANEOUS EVERY 8 HOURS SCHEDULED
Status: DISCONTINUED | OUTPATIENT
Start: 2019-01-01 | End: 2019-01-01 | Stop reason: HOSPADM

## 2019-01-01 RX ORDER — ACETAMINOPHEN 325 MG/1
650 TABLET ORAL ONCE
Status: CANCELLED | OUTPATIENT
Start: 2019-01-01 | End: 2019-01-01

## 2019-01-01 RX ORDER — DIPHENHYDRAMINE HCL 25 MG
25 TABLET ORAL ONCE
Status: COMPLETED | OUTPATIENT
Start: 2019-01-01 | End: 2019-01-01

## 2019-01-01 RX ORDER — CALCIUM GLUCONATE 45(500) MG
500 TABLET ORAL DAILY
COMMUNITY

## 2019-01-01 RX ORDER — LEVOTHYROXINE SODIUM 0.03 MG/1
25 TABLET ORAL DAILY
Qty: 30 TABLET | Refills: 5 | Status: SHIPPED | OUTPATIENT
Start: 2019-01-01 | End: 2019-01-01 | Stop reason: SDUPTHER

## 2019-01-01 RX ORDER — LEVOTHYROXINE SODIUM 0.07 MG/1
75 TABLET ORAL DAILY
Qty: 30 TABLET | Refills: 5 | Status: SHIPPED | OUTPATIENT
Start: 2019-01-01

## 2019-01-01 RX ORDER — FUROSEMIDE 10 MG/ML
20 INJECTION INTRAMUSCULAR; INTRAVENOUS ONCE
Status: COMPLETED | OUTPATIENT
Start: 2019-01-01 | End: 2019-01-01

## 2019-01-01 RX ORDER — OXYCODONE HYDROCHLORIDE 5 MG/1
5 TABLET ORAL EVERY 4 HOURS PRN
Qty: 60 TABLET | Refills: 0 | Status: SHIPPED | OUTPATIENT
Start: 2019-01-01

## 2019-01-01 RX ORDER — DOCUSATE SODIUM 100 MG/1
100 CAPSULE, LIQUID FILLED ORAL 2 TIMES DAILY
Qty: 10 CAPSULE | Refills: 0 | Status: SHIPPED | OUTPATIENT
Start: 2019-01-01

## 2019-01-01 RX ORDER — GABAPENTIN 300 MG/1
300 CAPSULE ORAL
Qty: 30 CAPSULE | Refills: 0 | Status: SHIPPED | OUTPATIENT
Start: 2019-01-01

## 2019-01-01 RX ORDER — ACETAMINOPHEN 325 MG/1
650 TABLET ORAL EVERY 6 HOURS PRN
Status: DISCONTINUED | OUTPATIENT
Start: 2019-01-01 | End: 2019-01-01 | Stop reason: HOSPADM

## 2019-01-01 RX ADMIN — DORZOLAMIDE HYDROCHLORIDE 1 DROP: 20 SOLUTION/ DROPS OPHTHALMIC at 08:32

## 2019-01-01 RX ADMIN — TRAVOPROST 1 DROP: 0.04 SOLUTION/ DROPS OPHTHALMIC at 21:00

## 2019-01-01 RX ADMIN — DORZOLAMIDE HYDROCHLORIDE 1 DROP: 20 SOLUTION/ DROPS OPHTHALMIC at 21:00

## 2019-01-01 RX ADMIN — DORZOLAMIDE HYDROCHLORIDE 1 DROP: 20 SOLUTION/ DROPS OPHTHALMIC at 16:23

## 2019-01-01 RX ADMIN — DORZOLAMIDE HYDROCHLORIDE 1 DROP: 20 SOLUTION/ DROPS OPHTHALMIC at 15:48

## 2019-01-01 RX ADMIN — DENOSUMAB 120 MG: 120 INJECTION SUBCUTANEOUS at 13:43

## 2019-01-01 RX ADMIN — ACETAMINOPHEN 650 MG: 325 TABLET, FILM COATED ORAL at 09:01

## 2019-01-01 RX ADMIN — ACETAMINOPHEN 650 MG: 325 TABLET, FILM COATED ORAL at 11:43

## 2019-01-01 RX ADMIN — ACETAMINOPHEN 650 MG: 325 TABLET ORAL at 21:27

## 2019-01-01 RX ADMIN — DORZOLAMIDE HYDROCHLORIDE 1 DROP: 20 SOLUTION/ DROPS OPHTHALMIC at 21:28

## 2019-01-01 RX ADMIN — HEPARIN SODIUM 5000 UNITS: 5000 INJECTION INTRAVENOUS; SUBCUTANEOUS at 05:32

## 2019-01-01 RX ADMIN — SODIUM CHLORIDE 20 ML/HR: 0.9 INJECTION, SOLUTION INTRAVENOUS at 09:01

## 2019-01-01 RX ADMIN — ACETAMINOPHEN 650 MG: 325 TABLET ORAL at 05:32

## 2019-01-01 RX ADMIN — SODIUM CHLORIDE 20 ML/HR: 0.9 INJECTION, SOLUTION INTRAVENOUS at 09:05

## 2019-01-01 RX ADMIN — ACETAMINOPHEN 650 MG: 325 TABLET ORAL at 19:00

## 2019-01-01 RX ADMIN — LEVOTHYROXINE SODIUM 75 MCG: 75 TABLET ORAL at 08:21

## 2019-01-01 RX ADMIN — LORAZEPAM 0.5 MG: 1 TABLET ORAL at 21:41

## 2019-01-01 RX ADMIN — DIPHENHYDRAMINE HCL 25 MG: 25 TABLET, FILM COATED ORAL at 09:02

## 2019-01-01 RX ADMIN — SERTRALINE HYDROCHLORIDE 50 MG: 50 TABLET ORAL at 08:21

## 2019-01-01 RX ADMIN — FUROSEMIDE 20 MG: 10 INJECTION, SOLUTION INTRAMUSCULAR; INTRAVENOUS at 15:08

## 2019-01-01 RX ADMIN — MELATONIN 6 MG: 3 TAB ORAL at 23:25

## 2019-01-01 RX ADMIN — FUROSEMIDE 20 MG: 10 INJECTION, SOLUTION INTRAMUSCULAR; INTRAVENOUS at 14:17

## 2019-01-01 RX ADMIN — ACETAMINOPHEN 650 MG: 325 TABLET ORAL at 03:48

## 2019-01-01 RX ADMIN — DENOSUMAB 120 MG: 120 INJECTION SUBCUTANEOUS at 13:46

## 2019-01-01 RX ADMIN — SERTRALINE HYDROCHLORIDE 50 MG: 50 TABLET ORAL at 08:32

## 2019-01-01 RX ADMIN — DENOSUMAB 120 MG: 120 INJECTION SUBCUTANEOUS at 13:33

## 2019-01-01 RX ADMIN — HEPARIN SODIUM 5000 UNITS: 5000 INJECTION INTRAVENOUS; SUBCUTANEOUS at 14:36

## 2019-01-01 RX ADMIN — ACETAMINOPHEN 650 MG: 325 TABLET, FILM COATED ORAL at 11:28

## 2019-01-01 RX ADMIN — SODIUM CHLORIDE TAB 1 GM 1 G: 1 TAB at 11:24

## 2019-01-01 RX ADMIN — SODIUM CHLORIDE TAB 1 GM 1 G: 1 TAB at 15:48

## 2019-01-01 RX ADMIN — DENOSUMAB 120 MG: 120 INJECTION SUBCUTANEOUS at 11:59

## 2019-01-01 RX ADMIN — HYDROCORTISONE SODIUM SUCCINATE 100 MG: 100 INJECTION, POWDER, FOR SOLUTION INTRAMUSCULAR; INTRAVENOUS at 13:18

## 2019-01-01 RX ADMIN — DORZOLAMIDE HYDROCHLORIDE 1 DROP: 20 SOLUTION/ DROPS OPHTHALMIC at 08:20

## 2019-01-01 RX ADMIN — ACETAMINOPHEN 650 MG: 325 TABLET ORAL at 11:25

## 2019-01-01 RX ADMIN — ACETAMINOPHEN 650 MG: 325 TABLET ORAL at 11:52

## 2019-01-01 RX ADMIN — TRAVOPROST 1 DROP: 0.04 SOLUTION/ DROPS OPHTHALMIC at 21:28

## 2019-01-01 RX ADMIN — SODIUM CHLORIDE 20 ML/HR: 0.9 INJECTION, SOLUTION INTRAVENOUS at 11:44

## 2019-01-01 RX ADMIN — SODIUM CHLORIDE TAB 1 GM 1 G: 1 TAB at 18:59

## 2019-01-01 RX ADMIN — DENOSUMAB 120 MG: 120 INJECTION SUBCUTANEOUS at 13:21

## 2019-01-01 RX ADMIN — ACETAMINOPHEN 650 MG: 325 TABLET, FILM COATED ORAL at 13:17

## 2019-01-01 RX ADMIN — DENOSUMAB 120 MG: 120 INJECTION SUBCUTANEOUS at 13:29

## 2019-01-01 RX ADMIN — SODIUM CHLORIDE 20 ML/HR: 0.9 INJECTION, SOLUTION INTRAVENOUS at 11:42

## 2019-01-01 RX ADMIN — IOHEXOL 100 ML: 350 INJECTION, SOLUTION INTRAVENOUS at 13:54

## 2019-01-01 RX ADMIN — HEPARIN SODIUM 5000 UNITS: 5000 INJECTION INTRAVENOUS; SUBCUTANEOUS at 13:36

## 2019-01-01 RX ADMIN — HEPARIN SODIUM 5000 UNITS: 5000 INJECTION INTRAVENOUS; SUBCUTANEOUS at 06:36

## 2019-01-01 RX ADMIN — DENOSUMAB 120 MG: 120 INJECTION SUBCUTANEOUS at 11:22

## 2019-01-01 RX ADMIN — HEPARIN SODIUM 5000 UNITS: 5000 INJECTION INTRAVENOUS; SUBCUTANEOUS at 21:28

## 2019-01-01 RX ADMIN — SODIUM CHLORIDE TAB 1 GM 1 G: 1 TAB at 08:21

## 2019-01-01 RX ADMIN — ACETAMINOPHEN 650 MG: 325 TABLET, FILM COATED ORAL at 11:02

## 2019-01-01 RX ADMIN — DIPHENHYDRAMINE HYDROCHLORIDE 25 MG: 50 INJECTION, SOLUTION INTRAMUSCULAR; INTRAVENOUS at 11:42

## 2019-01-01 RX ADMIN — ACETAMINOPHEN 650 MG: 325 TABLET, FILM COATED ORAL at 09:02

## 2019-01-01 RX ADMIN — HEPARIN SODIUM 5000 UNITS: 5000 INJECTION INTRAVENOUS; SUBCUTANEOUS at 21:00

## 2019-01-01 RX ADMIN — SODIUM CHLORIDE 50 ML/HR: 0.9 INJECTION, SOLUTION INTRAVENOUS at 20:39

## 2019-01-01 RX ADMIN — MELATONIN 6 MG: 3 TAB ORAL at 21:28

## 2019-01-01 RX ADMIN — LEVOTHYROXINE SODIUM 75 MCG: 75 TABLET ORAL at 08:32

## 2019-01-03 NOTE — TELEPHONE ENCOUNTER
Patients wife Fernando Collins is calling to check status of her husbands [place] delivery from [facility] and is taking his last med today?

## 2019-01-07 NOTE — TELEPHONE ENCOUNTER
Spoke with Jen Iniguez to advise finance group would be calling to discuss, she said she was going to give them a call if she did not hear anything from us

## 2019-01-07 NOTE — TELEPHONE ENCOUNTER
Sydni Carnes, please advise on this  Eual Rianna, please inform will be followed up by finance group

## 2019-01-07 NOTE — TELEPHONE ENCOUNTER
Hi,    I called the patient's wife  She stated that the assistance ran out and that it needs to be renewed  She also stated that her  is out of medication and he was supposed to receive it tomorrow but now they don't think they will without the funding  Kiley Reyes, do you know anything about this?

## 2019-01-10 NOTE — TELEPHONE ENCOUNTER
[address]     Patient Approved for Patient Assistance for Roger Champion    Valid# 7-8-18  Thru 1-5-20    Card# [id number]  Bin# [id number]  PCN# [id number]  [facility]#  [id number]    Patient notified and email sent

## 2019-01-10 NOTE — PROGRESS NOTES
[address]     Patient Approved for Patient Assistance for Julianne Domingo    Valid# 7-8-18  Thru 1-5-20    Card# [id number]  Bin# [id number]  PCN# [id number]  [facility]#  [id number]    Patient notified and email sent

## 2019-01-15 NOTE — TELEPHONE ENCOUNTER
I contacted [facility] and patient will be receiving medication tomorrow  He should expect a call from Enrique Preston today  Also, he needs a repeat CBC  He should have had done last week  Please have him get this week

## 2019-01-15 NOTE — TELEPHONE ENCOUNTER
CALL FROM PT SAYING HE HAS BEEN WAITING FOR XTANDI FOR 3WK NOW     WHY HAS IT NOT BEEN FED X'D YET       WHEN I LOOKED UP THE DRUG FOR THE PT IT SAID IT WENT TO [facility]      PT WOULD LIKE TO SPEAK TO A NURSE OR DR GARCIA AS SOON AS POSSIBLE

## 2019-01-18 NOTE — TELEPHONE ENCOUNTER
Spoke with patient regarding blood work  He states he is feeling well  Please arrange for 1 unit PRBC over 3 hours and 1 bag of platelets at the [facility] infusion center next week, Monday or Tuesday  Type and screen order is in

## 2019-01-21 NOTE — TELEPHONE ENCOUNTER
Milton bartholomew called in critical     plt count is 23,000    Called in from [facility]

## 2019-01-22 NOTE — PROGRESS NOTES
Pt tolerated transfusions today without incident  Pt and wife provided with AVS and both are aware of future appts

## 2019-01-23 NOTE — TELEPHONE ENCOUNTER
Please call patient and let him know he needs repeat blood work on 2/6/19  Please have done at [facility]      Orders are in the system

## 2019-01-23 NOTE — TELEPHONE ENCOUNTER
Spoke with patient  He will be going to [facility] on [address] on 2/6/19 to get his labs done  Patient voiced understanding

## 2019-02-01 NOTE — PROGRESS NOTES
Patient arrived on unit today for Xgeva; denies any concerns or dental/jaw issues  Patient's Calcium is 8 0 from 1/16/2019 which meets parameters on order  Patient educated to make sure he continues to take his daily Calcium supplement and possible side effects of hypocalcemia, patient verbalized understanding of teaching provided  Patient tolerated injection without complaints  AVS provided, left unit in stable condition

## 2019-02-01 NOTE — TELEPHONE ENCOUNTER
Spoke with Jennifer Cervantes and informed Faustino Saldivar has refills  [facility] pharmacy should call to set up delivery but also provided their phone #

## 2019-02-01 NOTE — TELEPHONE ENCOUNTER
Spoke with patients spouse - regarding refill - patient has only one week left of his chemo pills  Last month he went without his chemo med for 3 weeks hopefully we can do refill soon  She also mentioned she spoke with pharmacy and told they need the physician verification form please address

## 2019-02-21 NOTE — TELEPHONE ENCOUNTER
Spoke to patients wife Jen Iniguez and informed her that her husbands appt needed to be R/S   His new appt is 03/06/19 at 1:00 pm

## 2019-03-07 NOTE — TELEPHONE ENCOUNTER
Patient needs 2 unit PRBC at [facility]  1 unit is each over 3 hours  He could have one tomorrow and one on Monday if they can;t do both tomorrow  Please arrange, preferably for tomorrow

## 2019-03-08 NOTE — PLAN OF CARE
Problem: Potential for Falls  Goal: Patient will remain free of falls  Description  INTERVENTIONS:  - Assess patient frequently for physical needs  -  Identify cognitive and physical deficits and behaviors that affect risk of falls    -  [place] fall precautions as indicated by assessment   - Educate patient/family on patient safety including physical limitations  - Instruct patient to call for assistance with activity based on assessment  - Modify environment to reduce risk of injury  - Consider OT/PT consult to assist with strengthening/mobility  Outcome: Progressing

## 2019-03-08 NOTE — PROGRESS NOTES
Patient tolerated transfusion of two units of PRBC's today without complication  Patient declines AVS, left unit in stable condition

## 2019-03-13 NOTE — PROGRESS NOTES
Hematology/Oncology Outpatient Follow-up  Paulie Garcia 81 y o  male 1935 [id number]    Date:  3/13/2019      Assessment and Plan:  1  Prostate cancer ([address] ), 2  Bone metastases [facility])  42-year-old male with history of metastatic prostate cancer  Patient has been on therapy with More Perfect since the middle of January 2019  This week he will be completing his 2nd cycle of therapy  We reviewed recent PSA  Discussed that slight fluctuation is okay  We will continue to monitor monthly  If PSA would continue to increase while being on More Perfect, we likely will need to switch to a different therapy  We also reviewed the patient's anemia is still secondary to infiltration of patient's prostate cancer into the bone marrow  We discussed increasing the interval of assessment of CBC  Patient and  do not wish to do this  We will continue monthly blood work  They are agreeable however that if patient becomes symptomatic, as he was last week, it will go to the lab earlier to have blood assessed  - CBC and differential; Standing  - Comprehensive metabolic panel; Standing  - PSA Total, Diagnostic; Standing  - Blood Bank Hold Tube; Standing  - CBC and differential  - Comprehensive metabolic panel  - PSA Total, Diagnostic  - Blood Bank Hold Tube    4  Anemia, unspecified type  See above  Secondary to prostate cancer  HPI:    Oncology History     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  He is on Lupron Depot shot once every 6 months from his urologist   He is on Xgeva once every 3 months    He has been taking vitamin-D and calcium for Xgeva  No more problem with his teeth  Lupron depot was started in April 2017    Disease progressed in 2018  In  June 2018 patient was started on xofigo         Prostate cancer [facility])    1/25/2018 Initial Diagnosis     Prostate cancer [facility])          Chemotherapy      April 2017- Lupron depot   [place]          Radiation      June 2018- xofigo            Interval history:  Prior to blood transfusion last week patient was feeling lightheaded  He was less active  He is not realistic this was from decreasing hemoglobin  He is now aware of this symptom  He is now feeling much better  He has more energy  ROS: Review of Systems   Constitutional: Negative for activity change, appetite change, chills, fatigue, fever and unexpected weight change  HENT: Negative for mouth sores and nosebleeds  Respiratory: Negative for cough and shortness of breath  Cardiovascular: Negative for chest pain, palpitations and leg swelling  Gastrointestinal: Negative for abdominal pain, blood in stool, constipation, diarrhea, nausea and vomiting  Genitourinary: Negative for difficulty urinating, dysuria and hematuria  Musculoskeletal: Negative for arthralgias  Skin: Negative  Neurological: Negative for dizziness, weakness, light-headedness, numbness and headaches  Hematological: Negative  Psychiatric/Behavioral: Negative          Past Medical History:   Diagnosis Date    Acute bronchitis     Hypertension     [facility] [address]     Urinary frequency     UTI (urinary tract infection)        Past Surgical History:   Procedure Laterality Date    CHOLECYSTECTOMY      COLONOSCOPY      [id number] ([facility])       Social History     Socioeconomic History    Marital status: /Civil Union     Spouse name: Not on file    Number of children: Not on file    Years of education: Not on file    Highest education level: Not on file   Occupational History    Not on file   Social Needs    Financial resource strain: Not on file    Food insecurity:     Worry: Not on file     Inability: Not on file    Transportation needs:     Medical: Not on file     Non-medical: Not on file   Tobacco Use    Smoking status: Former Smoker     Last attempt to quit:      Years since quittin 2    Smokeless tobacco: Never Used   Substance and Sexual Activity    Alcohol use: Yes     Comment: 1/day   Per Allscripts: Social    Drug use: No    Sexual activity: Not on file   Lifestyle    Physical activity:     Days per week: Not on file     Minutes per session: Not on file    Stress: Not on file   Relationships    Social connections:     Talks on phone: Not on file     Gets together: Not on file     Attends Scientologist service: Not on file     Active member of club or organization: Not on file     Attends meetings of clubs or organizations: Not on file     Relationship status: Not on file    Intimate partner violence:     Fear of current or ex partner: Not on file     Emotionally abused: Not on file     Physically abused: Not on file     Forced sexual activity: Not on file   Other Topics Concern    Not on file   Social History Narrative    Not on file       Family History   Problem Relation Age of Onset    Breast cancer Mother     Prostate cancer Father     Prostate cancer Brother     Stroke Family         CVA    Hypertension Family        No Known Allergies      Current Outpatient Medications:     brimonidine (ALPHAGAN P) 0 1 %, INSTILL 1 DROP TWICE A DAY INTO BOTH EYES, Disp: , Rfl:     brimonidine-timolol (COMBIGAN) 0 2-0 5 %, Administer 1 drop to both eyes every 12 (twelve) hours, Disp: , Rfl:     calcium gluconate 500 mg tablet, Take 500 mg by mouth daily, Disp: , Rfl:     cholecalciferol (VITAMIN D3) 1,000 units tablet, Take 1,000 Units by mouth daily, Disp: , Rfl:     dorzolamide (TRUSOPT) 2 % ophthalmic solution, 1 drop 3 (three) times a day, Disp: , Rfl:     enzalutamide (XTANDI) 40 mg CAPS, Take 4 capsules (160 mg total) by mouth daily, Disp: 120 capsule, Rfl: 5    metoprolol tartrate (LOPRESSOR) 25 mg tablet, Take 1 tablet (25 mg total) by mouth every 12 (twelve) hours, Disp: 180 tablet, Rfl: 5    travoprost (TRAVATAN Z) 0 004 % ophthalmic solution, 1 drop daily at bedtime, Disp: , Rfl:     lisinopril (ZESTRIL) 20 mg tablet, Take 10 mg by mouth daily  , Disp: , Rfl:     Multiple Vitamins-Minerals (PRESERVISION/LUTEIN) CAPS, Take by mouth daily, Disp: , Rfl:     multivitamin (THERAGRAN) TABS, Take 1 tablet by mouth daily, Disp: , Rfl:     ondansetron (ZOFRAN) 8 mg tablet, TAKE ONE TABLET BY MOUTH EVERY EIGHT HOURS AS NEEDED NAUSEA AND VOMITING  (Patient not taking: Reported on 3/13/2019), Disp: 30 tablet, Rfl: 0    Vitamins C E (VITAMIN C/VITAMIN E PO), Take by mouth, Disp: , Rfl:       Physical Exam:  /80 (BP Location: Left arm)   Pulse 79   Temp 97 7 °F (36 5 °C) (Tympanic)   Resp 19   Ht 5' 8 5" (1 74 m)   Wt 102 kg (225 lb)   SpO2 98%   BMI 33 71 kg/m²     Physical Exam   Constitutional: He is oriented to person, place, and time  He appears well-developed and well-nourished  No distress  HENT:   Head: Normocephalic and atraumatic  Eyes: Conjunctivae are normal  No scleral icterus  Neck: Normal range of motion  Neck supple  Cardiovascular: Normal rate, regular rhythm and normal heart sounds  Pulmonary/Chest: Effort normal and breath sounds normal  No respiratory distress  Abdominal: Soft  There is no tenderness  Musculoskeletal: Normal range of motion  He exhibits no edema or tenderness  Lymphadenopathy:     He has no cervical adenopathy  Neurological: He is alert and oriented to person, place, and time  No cranial nerve deficit  Skin: Skin is warm and dry  No pallor  Psychiatric: He has a normal mood and affect           Labs:  Lab Results   Component Value Date    WBC 4 69 03/07/2019    HGB 6 9 (LL) 03/07/2019    HCT 22 8 (L) 03/07/2019     (H) 03/07/2019    PLT 54 (L) 03/07/2019     Lab Results   Component Value Date     12/27/2017    K 4 3 03/07/2019     03/07/2019    CO2 27 03/07/2019    BUN 15 03/07/2019    CREATININE 0 60 03/07/2019    GLUF 142 (H) 03/07/2019    CALCIUM 8 3 03/07/2019    AST 28 03/07/2019    ALT 23 03/07/2019    ALKPHOS 461 (H) 03/07/2019    PROT 7 0 12/27/2017    BILITOT 0 5 12/27/2017    EGFR 93 03/07/2019       Patient voiced understanding and agreement in the above discussion  Aware to contact our office with questions/symptoms in the interim

## 2019-04-25 NOTE — PLAN OF CARE
Problem: Potential for Falls  Goal: Patient will remain free of falls  Description  INTERVENTIONS:  - Assess patient frequently for physical needs  -  Identify cognitive and physical deficits and behaviors that affect risk of falls    -  [place] fall precautions as indicated by assessment   - Educate patient/family on patient safety including physical limitations  - Instruct patient to call for assistance with activity based on assessment  - Modify environment to reduce risk of injury  - Consider OT/PT consult to assist with strengthening/mobility  Outcome: Progressing

## 2019-04-25 NOTE — PROGRESS NOTES
Calcium level was 8 8 on 4/22/19  Patient offers no complaints  Xgeva given as ordered  Pt is aware of all future appointments   Refused AVS

## 2019-05-22 PROBLEM — D64.9 ANEMIA, UNSPECIFIED: Status: ACTIVE | Noted: 2019-01-01

## 2019-05-29 NOTE — PLAN OF CARE
Problem: PAIN - ADULT  Goal: Verbalizes/displays adequate comfort level or baseline comfort level  Description  Interventions:  - Encourage patient to monitor pain and request assistance  - Assess pain using appropriate pain scale  - Administer analgesics based on type and severity of pain and evaluate response  - Implement non-pharmacological measures as appropriate and evaluate response  - Consider cultural and social influences on pain and pain management  - Notify physician/advanced practitioner if interventions unsuccessful or patient reports new pain  Outcome: Progressing     Problem: INFECTION - ADULT  Goal: Absence or prevention of progression during hospitalization  Description  INTERVENTIONS:  - Assess and monitor for signs and symptoms of infection  - Monitor lab/diagnostic results  - Monitor all insertion sites, i e  indwelling lines, tubes, and drains  - Monitor endotracheal (as able) and nasal secretions for changes in amount and color  - [place] appropriate cooling/warming therapies per order  - Administer medications as ordered  - Instruct and encourage patient and family to use good hand hygiene technique  - Identify and instruct in appropriate isolation precautions for identified infection/condition  Outcome: Progressing  Goal: Absence of fever/infection during neutropenic period  Description  INTERVENTIONS:  - Monitor WBC  - Implement neutropenic guidelines  Outcome: Progressing     Problem: Knowledge Deficit  Goal: Patient/family/caregiver demonstrates understanding of disease process, treatment plan, medications, and discharge instructions  Description  Complete learning assessment and assess knowledge base    Interventions:  - Provide teaching at level of understanding  - Provide teaching via preferred learning methods  Outcome: Progressing

## 2019-05-29 NOTE — PLAN OF CARE
Problem: PAIN - ADULT  Goal: Verbalizes/displays adequate comfort level or baseline comfort level  Description  Interventions:  - Encourage patient to monitor pain and request assistance  - Assess pain using appropriate pain scale  - Administer analgesics based on type and severity of pain and evaluate response  - Implement non-pharmacological measures as appropriate and evaluate response  - Consider cultural and social influences on pain and pain management  - Notify physician/advanced practitioner if interventions unsuccessful or patient reports new pain  5/29/2019 1334 by Keri Coughlin RN  Outcome: Progressing  5/29/2019 1334 by Keri Coughlin RN  Outcome: Progressing     Problem: INFECTION - ADULT  Goal: Absence or prevention of progression during hospitalization  Description  INTERVENTIONS:  - Assess and monitor for signs and symptoms of infection  - Monitor lab/diagnostic results  - Monitor all insertion sites, i e  indwelling lines, tubes, and drains  - Monitor endotracheal (as able) and nasal secretions for changes in amount and color  - [place] appropriate cooling/warming therapies per order  - Administer medications as ordered  - Instruct and encourage patient and family to use good hand hygiene technique  - Identify and instruct in appropriate isolation precautions for identified infection/condition  5/29/2019 1334 by Keri Coughlin RN  Outcome: Progressing  5/29/2019 1334 by Keri Coughlin RN  Outcome: Progressing  Goal: Absence of fever/infection during neutropenic period  Description  INTERVENTIONS:  - Monitor WBC  - Implement neutropenic guidelines  5/29/2019 1334 by Keri Coughlin RN  Outcome: Progressing  5/29/2019 1334 by Keri Coughlin RN  Outcome: Progressing     Problem: Knowledge Deficit  Goal: Patient/family/caregiver demonstrates understanding of disease process, treatment plan, medications, and discharge instructions  Description  Complete learning assessment and assess knowledge base    Interventions:  - Provide teaching at level of understanding  - Provide teaching via preferred learning methods  5/29/2019 1334 by Elida Montague RN  Outcome: Progressing  5/29/2019 1334 by Elida Montague RN  Outcome: Progressing

## 2019-05-29 NOTE — PROGRESS NOTES
Pt  Denies new symptoms or concerns at this time  Hgb 7 8   1 unit of PRBCs ordered for infusion today

## 2019-06-19 NOTE — PROGRESS NOTES
Patient arrived to the infusion center without complaints  Xgeva given as ordered  Pt is aware of all future appointments

## 2019-07-01 NOTE — TELEPHONE ENCOUNTER
Placed order for the patients labs  Please review to see if any other labs are needed to be added for the patient to completed  Thank you

## 2019-07-10 NOTE — TELEPHONE ENCOUNTER
Patient's wife called in and stated that they had been told by [facility] the assistance with Anthony Terrazas is ending and he only has about 6 days of medication left  They'd like to know what happens next  If they can get more assistance, or is he not getting anymore medication etc  Please call and advise   [phone]

## 2019-07-10 NOTE — TELEPHONE ENCOUNTER
For Patient Narendra Solis [birth year] (Richi-Please have patients  updated to reflect correct )    Patient enrolled in the [facility] for [address] $ 7500 00    ID# [id number]  [facility]# [id number]  BIN# [id number]  PCN# [id number]  EFF: 19  THRU:  20    EPIC NOTED, Pharmacy notified

## 2019-07-10 NOTE — TELEPHONE ENCOUNTER
Please review and contact the patients wife with further instructions  Please let me know if you need any additional information

## 2019-07-10 NOTE — PROGRESS NOTES
For Patient Ashvin Gins [birth year] (Richi-Please have patients  updated to reflect correct )    Patient enrolled in the [facility] for [address] $ 7500 00    ID# [id number]  [facility]# [id number]  BIN# [id number]  PCN# [id number]  EFF: 19  THRU:  20    EPIC NOTED, Pharmacy notified

## 2019-07-10 NOTE — TELEPHONE ENCOUNTER
Sent email to oral chemo team for assistance in looking into patient funding/assistance needs for Xtandi

## 2019-07-17 NOTE — PROGRESS NOTES
Pt here for xgeva injection  Pt did not have CMP drawn prior to this visit  RN noticed pt had CBC done 7/1/19 and hemoglobin result was 7 8  Spoke with Sabino Moore RN from Dr Mayra Ford office  She will order repeat CBC and type and hold for pt  Awaiting lab work for xgeva and hemoglobin result

## 2019-07-17 NOTE — PROGRESS NOTES
Pt scheduled for Thursday and Friday for blood transfusion  Pt provided with AVS and is aware of appts

## 2019-07-17 NOTE — PROGRESS NOTES
Dennys Ricketts, RN made aware of hemoglobin of 7 5  Dr Tony García requested pt come for 1 unit of PRBC's tomorrow and 1 unit on Friday  Epic orders to follow

## 2019-07-18 NOTE — PLAN OF CARE
Problem: Potential for Falls  Goal: Patient will remain free of falls  Description  INTERVENTIONS:  - Assess patient frequently for physical needs  -  Identify cognitive and physical deficits and behaviors that affect risk of falls    -  [place] fall precautions as indicated by assessment   - Educate patient/family on patient safety including physical limitations  - Instruct patient to call for assistance with activity based on assessment  - Modify environment to reduce risk of injury  - Consider OT/PT consult to assist with strengthening/mobility  Outcome: Progressing

## 2019-07-18 NOTE — PROGRESS NOTES
Pt tolerated transfusion of 1unit PRBC well without any adverse reaction  Pt aware of appt tomorrow for transfusion second unit  Pt left unit in stable condition without question or concern

## 2019-07-19 NOTE — PLAN OF CARE
Problem: Potential for Falls  Goal: Patient will remain free of falls  Description  INTERVENTIONS:  - Assess patient frequently for physical needs  -  Identify cognitive and physical deficits and behaviors that affect risk of falls    -  [place] fall precautions as indicated by assessment   - Educate patient/family on patient safety including physical limitations  - Instruct patient to call for assistance with activity based on assessment  - Modify environment to reduce risk of injury  - Consider OT/PT consult to assist with strengthening/mobility  Outcome: Progressing

## 2019-07-29 NOTE — TELEPHONE ENCOUNTER
Left message for patient to reschedule his recently canceled appointment  If patient calls back, please schedule follow up asap, as patient is due for Lupron  Can be scheduled with an AP

## 2019-07-30 NOTE — TELEPHONE ENCOUNTER
Patient scheduled for 8/2/19 at 9:45 in the [facility] office with Akin Clayton PA-C (R)

## 2019-08-01 NOTE — PROGRESS NOTES
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  -  (6/10/19), 312 4 (10/30/18), 22 3 (6/11/18)  - 6 month depot of Lupron administered today  - continue on combined androgen blockade with Lupron every 6 months indefinitely and Casodex   - continue on Xgeva and xofigo with hematology oncology, seeing them 8/7/19    FU 6 months with PSA prior       History of Present Illness  Ja Tellez is a 81 y o  male here for follow up evaluation of stage IV [place] 8 prostate cancer status post IMRT (2011) and ADT (initiated 4/3/2017) with bony mets  Most recent bone scan 5/22/2018 reveals worsening/progression of diffuse osseous metastasis  Currently on continuous Lupron (4/2017), Casodex (4/2018), Xgeva (9/2017), and is s/p 4 xofigo treatments (6/2018-9/2018)  The patient underwent 4 xofigo infusions and was unable to tolerate more than this  Last Lupron 11/2/18  He continues with Hematology Oncology  His most recent PSA is 280 6 (6/10/19), down from 312 4 (10/30/2018)  Previously 22 3 (6/11/18)  Denies any bone pain  Also denies hot flashes  LUTs as below  Patient has no urinary complaints  Review of Systems   Constitutional: Negative for activity change, chills and fever  Gastrointestinal: Negative for abdominal distention and abdominal pain  Musculoskeletal: Negative for back pain and gait problem  Psychiatric/Behavioral: Negative for behavioral problems and confusion  Urinary Incontinence Screening      Most Recent Value   Urinary Incontinence   Urinary Incontinence? No   Incomplete emptying? No   Urinary frequency? No   Urinary urgency? No   Urinary hesitancy? No   Dysuria (painful difficult urination)? No   Nocturia (waking up to use the bathroom)?   Yes [3-4 times per night]   Straining (having to push to go)? No   Weak stream?  No   Intermittent stream?  Yes   Post void dribbling? No          Past Medical History  Past Medical History:   Diagnosis Date    Acute bronchitis     Hypertension     Prostate CA ([address] )     Urinary frequency     UTI (urinary tract infection)        Past Social History  Past Surgical History:   Procedure Laterality Date    CHOLECYSTECTOMY      COLONOSCOPY      [id number] [de-identified])     Social History     Tobacco Use   Smoking Status Former Smoker    Last attempt to quit:     Years since quittin 6   Smokeless Tobacco Never Used       Past Family History  Family History   Problem Relation Age of Onset   Eron Hackett Breast cancer Mother     Prostate cancer Father     Prostate cancer Brother     Stroke Family         CVA    Hypertension Family        Past Social history  Social History     Socioeconomic History    Marital status: /Civil Union     Spouse name: Not on file    Number of children: Not on file    Years of education: Not on file    Highest education level: Not on file   Occupational History    Not on file   Social Needs    Financial resource strain: Not on file    Food insecurity:     Worry: Not on file     Inability: Not on file    Transportation needs:     Medical: Not on file     Non-medical: Not on file   Tobacco Use    Smoking status: Former Smoker     Last attempt to quit:      Years since quittin 6    Smokeless tobacco: Never Used   Substance and Sexual Activity    Alcohol use: Yes     Comment: 1/day   Per [employer]: Social    Drug use: No    Sexual activity: Not on file   Lifestyle    Physical activity:     Days per week: Not on file     Minutes per session: Not on file    Stress: Not on file   Relationships    Social connections:     Talks on phone: Not on file     Gets together: Not on file     Attends Protestant service: Not on file     Active member of club or organization: Not on file     Attends meetings of clubs or organizations: Not on file     Relationship status: Not on file    Intimate partner violence:     Fear of current or ex partner: Not on file     Emotionally abused: Not on file     Physically abused: Not on file     Forced sexual activity: Not on file   Other Topics Concern    Not on file   Social History Narrative    Not on file       Current Medications  Current Outpatient Medications   Medication Sig Dispense Refill    brimonidine (ALPHAGAN P) 0 1 % INSTILL 1 DROP TWICE A DAY INTO BOTH EYES      brimonidine-timolol (COMBIGAN) 0 2-0 5 % Administer 1 drop to both eyes every 12 (twelve) hours      calcium gluconate 500 mg tablet Take 500 mg by mouth daily      cholecalciferol (VITAMIN D3) 1,000 units tablet Take 1,000 Units by mouth daily      dorzolamide (TRUSOPT) 2 % ophthalmic solution 1 drop 3 (three) times a day      enzalutamide (XTANDI) 40 mg capsule Take 4 capsules (160 mg total) by mouth daily 120 capsule 5    levothyroxine 50 mcg tablet Take 1 tablet (50 mcg total) by mouth daily 90 tablet 3    lisinopril (ZESTRIL) 20 mg tablet Take 10 mg by mouth daily        metoprolol tartrate (LOPRESSOR) 25 mg tablet Take 1 tablet (25 mg total) by mouth every 12 (twelve) hours (Patient not taking: Reported on 7/18/2019) 180 tablet 5    Multiple Vitamins-Minerals (PRESERVISION/LUTEIN) CAPS Take by mouth daily      multivitamin (THERAGRAN) TABS Take 1 tablet by mouth daily      ondansetron (ZOFRAN) 8 mg tablet TAKE ONE TABLET BY MOUTH EVERY EIGHT HOURS AS NEEDED NAUSEA AND VOMITING  (Patient not taking: Reported on 5/22/2019) 30 tablet 0    travoprost (TRAVATAN Z) 0 004 % ophthalmic solution 1 drop daily at bedtime      Vitamins C E (VITAMIN C/VITAMIN E PO) Take by mouth       No current facility-administered medications for this visit          Allergies  No Known Allergies      The following portions of the patient's history were reviewed and updated as appropriate: allergies, current medications, past medical history, past social history, past surgical history and problem list       Vitals  There were no vitals filed for this visit  Physical Exam  Constitutional   General appearance: Patient is seated and in no acute distress, well appearing and well nourished  Head and Face   Head and face: Normal     Eyes   Conjunctiva and lids: No erythema, swelling or discharge  Ears, Nose, Mouth, and Throat   Hearing: Normal     Pulmonary   Respiratory effort: No increased work of breathing or signs of respiratory distress  Cardiovascular   Examination of extremities for edema and/or varicosities: Normal     Abdomen   Abdomen: Non-tender, no masses  Musculoskeletal   Gait and station: Walks with a cane  Skin   Skin and subcutaneous tissue: Warm, dry, and intact  No visible lesions or rashes  Psychiatric   Judgment and insight: Normal  Recent and remote memory:  Normal  Mood and affect: Normal      Results  No results found for this or any previous visit (from the past 1 hour(s)) ]  Lab Results   Component Value Date     6 (H) 06/10/2019     4 (H) 05/13/2019     1 (H) 04/22/2019     Lab Results   Component Value Date    CALCIUM 9 2 07/17/2019     12/27/2017    K 4 6 07/17/2019    CO2 29 07/17/2019     07/17/2019    BUN 15 07/17/2019    CREATININE 0 60 07/17/2019     Lab Results   Component Value Date    WBC 3 08 (L) 07/17/2019    HGB 7 5 (L) 07/17/2019    HCT 23 8 (L) 07/17/2019     (H) 07/17/2019     (L) 07/17/2019       Orders  No orders of the defined types were placed in this encounter

## 2019-08-07 PROBLEM — D69.6 THROMBOCYTOPENIA (HCC): Status: ACTIVE | Noted: 2019-01-01

## 2019-08-07 PROBLEM — E03.9 ACQUIRED HYPOTHYROIDISM: Status: ACTIVE | Noted: 2019-01-01

## 2019-08-07 NOTE — PROGRESS NOTES
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  Nila Olmos was started in December 2018  He has been on Xgeva with calcium and vitamin-D  He has transfusion-dependent symptomatic anemia and also thrombocytopenia but there has been a significant improvement in thrombocytopenia  His anemia is symptomatic  He feels tired  He feels lightheaded when there is drop in hemoglobin  He has occasionally low back discomfort       Minimal exertional dyspnea   He  arthritic symptoms  He ambulates with a cane  History of GI blood loss likely secondary to gastric ulcers secondary to excessive NSAID         History of hypothyroidism and his family physician has increased Synthroid dose from 25-50 mcg and with that TSH has come down from 7 4 to 5 7, still slightly high       Current Outpatient Medications:     brimonidine (ALPHAGAN P) 0 1 %, INSTILL 1 DROP TWICE A DAY INTO BOTH EYES, Disp: , Rfl:     brimonidine-timolol (COMBIGAN) 0 2-0 5 %, Administer 1 drop to both eyes every 12 (twelve) hours, Disp: , Rfl:     calcium gluconate 500 mg tablet, Take 500 mg by mouth daily, Disp: , Rfl:     cholecalciferol (VITAMIN D3) 1,000 units tablet, Take 1,000 Units by mouth daily, Disp: , Rfl:     dorzolamide (TRUSOPT) 2 % ophthalmic solution, 1 drop 3 (three) times a day, Disp: , Rfl:     enzalutamide (XTANDI) 40 mg capsule, Take 4 capsules (160 mg total) by mouth daily, Disp: 120 capsule, Rfl: 5    levothyroxine 50 mcg tablet, Take 1 tablet (50 mcg total) by mouth daily, Disp: 90 tablet, Rfl: 3    Multiple Vitamins-Minerals (PRESERVISION/LUTEIN) CAPS, Take by mouth daily, Disp: , Rfl:     multivitamin (THERAGRAN) TABS, Take 1 tablet by mouth daily, Disp: , Rfl:     ondansetron (ZOFRAN) 8 mg tablet, TAKE ONE TABLET BY MOUTH EVERY EIGHT HOURS AS NEEDED NAUSEA AND VOMITING , Disp: 30 tablet, Rfl: 0    travoprost (TRAVATAN Z) 0 004 % ophthalmic solution, 1 drop daily at bedtime, Disp: , Rfl:     lisinopril (ZESTRIL) 20 mg tablet, Take 10 mg by mouth daily  , Disp: , Rfl:     metoprolol tartrate (LOPRESSOR) 25 mg tablet, Take 1 tablet (25 mg total) by mouth every 12 (twelve) hours (Patient not taking: Reported on 8/7/2019), Disp: 180 tablet, Rfl: 5    Vitamins C E (VITAMIN C/VITAMIN E PO), Take by mouth, Disp: , Rfl:     No Known Allergies    Oncology History     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  He is on Lupron Depot shot once every 6 months from his urologist   He is on Xgeva once every 3 months    He has been taking vitamin-D and calcium for Xgeva  No more problem with his teeth  Lupron depot was started in April 2017  Disease progressed in 2018  In  June 2018 patient was started on xofigo         Prostate cancer ([address] )    1/25/2018 Initial Diagnosis     Prostate cancer [facility])       Chemotherapy      April 2017- Lupron depot   2018Alex Elders       Radiation      June 2018- xofigo         ROS:  08/07/19 Reviewed 13 systems:  Presently no other neurological, cardiac, pulmonary, GI and  symptoms other than mentioned above  No other symptoms like fever, chills, no more bleeding, bone pains, skin rash, weight loss,  weakness, numbness,  Claudication  No frequent infections  Not unusually sensitive to heat or cold  No swelling of the ankles  No swollen glands  Patient is anxious  Other symptoms are in HPI        /72 (BP Location: Right arm)   Pulse 100   Temp 98 7 °F (37 1 °C) (Tympanic)   Resp 16   Ht 5' 10" (1 778 m)   Wt 93 4 kg (206 lb)   SpO2 97%   BMI 29 56 kg/m²     Physical Exam:    Patient is alert and oriented  He is not in distress  Vital signs are as above    There is no scleral icterus,  no oral thrush, no palpable neck mass, clear lung fields, regular heart rate, abdomen  soft and non tender, no palpable abdominal mass, no ascites, no edema of ankles, no calf tenderness, no focal neurological deficit, has generalized weakness, no skin rash, no palpable lymphadenopathy, good arterial pulses, no clubbing  Patient is anxious  Performance status 2  He ambulates with a cane  IMAGING:      LABS:  Results for orders placed or performed in visit on 08/05/19   TSH, 3rd generation   Result Value Ref Range    TSH 3RD GENERATON 5 710 (H) 0 358 - 3 740 uIU/mL   Comprehensive metabolic panel   Result Value Ref Range    Sodium 138 136 - 145 mmol/L    Potassium 3 8 3 5 - 5 3 mmol/L    Chloride 106 100 - 108 mmol/L    CO2 26 21 - 32 mmol/L    ANION GAP 6 4 - 13 mmol/L    BUN 15 5 - 25 mg/dL    Creatinine 0 57 (L) 0 60 - 1 30 mg/dL    Glucose 123 65 - 140 mg/dL    Calcium 8 7 8 3 - 10 1 mg/dL    AST 34 5 - 45 U/L    ALT 15 12 - 78 U/L    Alkaline Phosphatase 329 (H) 46 - 116 U/L    Total Protein 7 0 6 4 - 8 2 g/dL    Albumin 3 1 (L) 3 5 - 5 0 g/dL    Total Bilirubin 0 37 0 20 - 1 00 mg/dL    eGFR 95 ml/min/1 73sq m     Labs, Imaging, & Other studies:   All pertinent labs and imaging studies were personally reviewed    Lab Results   Component Value Date     12/27/2017    K 3 8 08/05/2019     08/05/2019    CO2 26 08/05/2019    BUN 15 08/05/2019    CREATININE 0 57 (L) 08/05/2019    GLUF 119 (H) 04/22/2019    CALCIUM 8 7 08/05/2019    AST 34 08/05/2019    ALT 15 08/05/2019    ALKPHOS 329 (H) 08/05/2019    PROT 7 0 12/27/2017    BILITOT 0 5 12/27/2017    EGFR 95 08/05/2019     Lab Results   Component Value Date    WBC 3 08 (L) 07/17/2019    HGB 7 5 (L) 07/17/2019    HCT 23 8 (L) 07/17/2019     (H) 07/17/2019     (L) 07/17/2019    Patient received 2 units of blood after above blood counts  Reviewed and discussed with patient  Assessment and plan:  Patient is here with his wife     Patient is on Lupron Depot 6 month dose and Xtandi for 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  [place] Keepers was started in December 2018  He has been on Xgeva with calcium and vitamin-D  He has transfusion-dependent symptomatic anemia and also thrombocytopenia but there has been a significant improvement in thrombocytopenia  His anemia is symptomatic  He feels tired  He feels lightheaded when there is drop in hemoglobin  He has occasionally low back discomfort       Minimal exertional dyspnea   He  arthritic symptoms  He ambulates with a cane  History of GI blood loss likely secondary to gastric ulcers secondary to excessive NSAID  History of hypothyroidism and his family physician has increased Synthroid dose from 25-50 mcg and with that TSH has come down from 7 4 to 5 7, still slightly high      Physical examination and test results are as recorded and discussed  [facility] will have blood counts, chemistry and PSA once every 4 weeks and transfusions as needed clinically   He is being continued on Susy Knights with calcium and vitamin-D and LHRH hormone shots from his urologist  [facility] does not have problem with his teeth for Xgeva  Paolo Tuttleruthie PSA is slowly coming down   Disease appears to be stable    All discussed in detail   Questions answered      Goal is prolongation of survival   Also discussed the importance of eating healthy foods and staying active  Patient needs some assistance in his care  1  Prostate cancer ([address] )    - CBC and differential; Standing  - Comprehensive metabolic panel; Standing  - PSA Total, Diagnostic; Standing  - CBC and differential  - Comprehensive metabolic panel  - PSA Total, Diagnostic    2  Bone metastases (HCC)    - CBC and differential; Standing  - Comprehensive metabolic panel; Standing  - PSA Total, Diagnostic; Standing  - CBC and differential  - Comprehensive metabolic panel  - PSA Total, Diagnostic    3  Metastatic disease ([address] )      4  Anemia, unspecified type      5  Thrombocytopenia ([address] )      6   Acquired hypothyroidism            Patient voiced understanding and agreement in the discussion  Counseling / Coordination of Care   Greater than 50% of total time was spent with the patient and / or family counseling and / or coordination of care

## 2019-08-14 NOTE — PLAN OF CARE
Problem: PAIN - ADULT  Goal: Verbalizes/displays adequate comfort level or baseline comfort level  Description  Interventions:  - Encourage patient to monitor pain and request assistance  - Assess pain using appropriate pain scale  - Administer analgesics based on type and severity of pain and evaluate response  - Implement non-pharmacological measures as appropriate and evaluate response  - Consider cultural and social influences on pain and pain management  - Notify physician/advanced practitioner if interventions unsuccessful or patient reports new pain  Outcome: Progressing     Problem: INFECTION - ADULT  Goal: Absence or prevention of progression during hospitalization  Description  INTERVENTIONS:  - Assess and monitor for signs and symptoms of infection  - Monitor lab/diagnostic results  - Monitor all insertion sites, i e  indwelling lines, tubes, and drains  - Monitor endotracheal if appropriate and nasal secretions for changes in amount and color  - [place] appropriate cooling/warming therapies per order  - Administer medications as ordered  - Instruct and encourage patient and family to use good hand hygiene technique  - Identify and instruct in appropriate isolation precautions for identified infection/condition  Outcome: Progressing  Goal: Absence of fever/infection during neutropenic period  Description  INTERVENTIONS:  - Monitor WBC    Outcome: Progressing     Problem: Knowledge Deficit  Goal: Patient/family/caregiver demonstrates understanding of disease process, treatment plan, medications, and discharge instructions  Description  Complete learning assessment and assess knowledge base    Interventions:  - Provide teaching at level of understanding  - Provide teaching via preferred learning methods  Outcome: Progressing

## 2019-08-14 NOTE — PROGRESS NOTES
Pt denied new symptoms or concerns today  Labs reviewed and within Parameters for treatment today  Ca+ 8 7  Creatinine 0 57  Pt  Lord Dhaliwal to have xgeva with Creatinine Clearance > 30  Future appointment reviewed  AVS provided

## 2019-09-06 NOTE — PROGRESS NOTES
Assessment and Plan:     Problem List Items Addressed This Visit        Other    Hyperlipidemia, acquired    Relevant Medications    levothyroxine 75 mcg tablet      Other Visit Diagnoses     Hypothyroidism, unspecified type    -  Primary    Relevant Medications    levothyroxine 75 mcg tablet    Other Relevant Orders    TSH, 3rd generation         History of Present Illness:     Patient presents for Medicare Annual Wellness visit    Patient Care Team:  Apryl Bowman DO as PCP - Johanna Sandhoff, MD Miki Brooklyn, MD Lillette Pal, DO     Problem List:     Patient Active Problem List   Diagnosis    Prostate cancer [facility])    Acquired hypertriglyceridemia    Bone metastases ([facility] [address] )    CKD (chronic kidney disease) stage 2, GFR 60-89 ml/min    Decreased hearing, bilateral    Glaucoma    Hyperlipidemia, acquired    Macular degeneration    Metastatic disease ([facility] [address] )    Obesity    Other abnormal glucose    Iron deficiency anemia    Anemia, unspecified    Thrombocytopenia ([facility] [address] [facility]    Acquired hypothyroidism      Past Medical and Surgical History:     Past Medical History:   Diagnosis Date    Acute bronchitis     Hypertension     Prostate CA ([facility] [address] [facility]     Urinary frequency     UTI (urinary tract infection)      Past Surgical History:   Procedure Laterality Date    CHOLECYSTECTOMY      COLONOSCOPY      [id number] [de-identified])      Family History:     Family History   Problem Relation Age of Onset    Breast cancer Mother     Prostate cancer Father     Prostate cancer Brother     Stroke Family         CVA    Hypertension Family       Social History:     Social History     Tobacco Use   Smoking Status Former Smoker    Last attempt to quit:     Years since quittin 7   Smokeless Tobacco Never Used     Social History     Substance and Sexual Activity   Alcohol Use Yes    Comment: 1/day   Per [employer]: Social     Social History     Substance and Sexual Activity   Drug Use No      Medications and Allergies:     Current Outpatient Medications   Medication Sig Dispense Refill    brimonidine (ALPHAGAN P) 0 1 % INSTILL 1 DROP TWICE A DAY INTO BOTH EYES      brimonidine-timolol (COMBIGAN) 0 2-0 5 % Administer 1 drop to both eyes every 12 (twelve) hours      calcium gluconate 500 mg tablet Take 500 mg by mouth daily      cholecalciferol (VITAMIN D3) 1,000 units tablet Take 1,000 Units by mouth daily      dorzolamide (TRUSOPT) 2 % ophthalmic solution 1 drop 3 (three) times a day      enzalutamide (XTANDI) 40 mg capsule Take 4 capsules (160 mg total) by mouth daily 120 capsule 5    levothyroxine 75 mcg tablet Take 1 tablet (75 mcg total) by mouth daily 30 tablet 5    travoprost (TRAVATAN Z) 0 004 % ophthalmic solution 1 drop daily at bedtime      Multiple Vitamins-Minerals (PRESERVISION/LUTEIN) CAPS Take by mouth daily      multivitamin (THERAGRAN) TABS Take 1 tablet by mouth daily      Vitamins C E (VITAMIN C/VITAMIN E PO) Take by mouth       No current facility-administered medications for this visit  No Known Allergies   Immunizations:     Immunization History   Administered Date(s) Administered    INFLUENZA 09/22/2014    Influenza Injectable, MDCK, Preservative Free, Quadrivalent, 0 5 mL 10/10/2018    Influenza Split High Dose Preservative Free IM 10/11/2012, 10/17/2013, 11/05/2015, 10/24/2016, 10/20/2017    Pneumococcal Conjugate 13-Valent 03/20/2012, 06/21/2016    Pneumococcal Polysaccharide PPV23 01/16/2012    Zoster 12/06/2012      Medicare Screening Tests and Risk Assessments:     Wyvonna Dakins is here for his Subsequent Wellness visit  Last Medicare Wellness visit information reviewed, patient interviewed, no change since last AWV  Health Risk Assessment:  Patient rates overall health as fair  Patient feels that their physical health rating is Slightly better  Eyesight was rated as Same  Hearing was rated as Same  Patient feels that their emotional and mental health rating is Same  Pain experienced by patient in the last 7 days has been Some  Patient's pain rating has been 3/10  Patient states that he has experienced weight loss or gain in last 6 months  Emotional/Mental Health:  Patient has not been feeling nervous/anxious  PHQ-9 Depression Screening:    Frequency of the following problems over the past two weeks:      1  Little interest or pleasure in doing things: 1 - several days      2  Feeling down, depressed, or hopeless: 0 - not at all  PHQ-2 Score: 1          Broken Bones/Falls: Fall Risk Assessment:    In the past year, patient has experienced: No history of falling in past year          Bladder/Bowel:  Patient has not leaked urine accidently in the last six months  Patient reports no loss of bowel control  Immunizations:  Patient has had a flu vaccination within the last year  Patient has received a pneumonia shot  Patient has not received a shingles shot  Patient has not received tetanus/diphtheria shot  Home Safety:  Patient has trouble with stairs inside or outside of their home  Patient currently reports that there are no safety hazards present in home, working smoke alarms, working carbon monoxide detectors  Preventative Screenings:   prostate cancer screen performed, no colon cancer screen completed, cholesterol screen completed, glaucoma eye exam completed,     Nutrition:  Current diet: Regular with servings of the following:    Medications:  Patient is currently taking over-the-counter supplements  List of OTC medications includes: calcium, vitamin d,   Patient is able to manage medications  Lifestyle Choices:  Patient reports no tobacco use  Patient has smoked or used tobacco in the past   Patient has stopped his tobacco use  Tobacco use quit date: stopped about 50 years ago  Patient reports no alcohol use  Patient does not drive a vehicle  Patient wears seat belt      Current level of exercise of physical activity described by patient as: daily activity  Activities of Daily Living:  Can get out of bed by his or her self, able to dress self, able to make own meals, able to do own shopping, able to bathe self, can do own laundry/housekeeping, unable to manage own money and other related tasks    Previous Hospitalizations:  No hospitalization or ED visit in past 12 months        Advanced Directives:  Patient has decided on a power of   Patient has spoken to designated power of   Patient has completed advanced directive  Preventative Screening/Counseling:      Cardiovascular:      General: Screening Current          Diabetes:      General: Screening Current          Colorectal Cancer:      General: Screening Not Indicated          Prostate Cancer:      General: Screening Current          Osteoporosis:      General: Screening Not Indicated          AAA:      General: Screening Not Indicated          Glaucoma:      General: Risks and Benefits Discussed          HIV:      General: Screening Not Indicated          Hepatitis C:      General: Screening Not Indicated        Advanced Directives:   Patient has living will for healthcare, has durable POA for healthcare, patient has an advanced directive       Immunizations:      Influenza: Influenza Recommended Annually      Pneumococcal: Risks & Benefits Discussed      Shingrix: Risks & Benefits Discussed      Hepatitis B (Low risk patients): Series Not Indicated

## 2019-09-06 NOTE — PATIENT INSTRUCTIONS
Obesity   AMBULATORY CARE:   Obesity  is when your body mass index (BMI) is greater than 30  Your healthcare provider will use your height and weight to measure your BMI  The risks of obesity include  many health problems, such as injuries or physical disability  You may need tests to check for the following:  · Diabetes     · High blood pressure or high cholesterol     · Heart disease     · Gallbladder or liver disease     · Cancer of the colon, breast, prostate, liver, or kidney     · Sleep apnea     · Arthritis or gout  Seek care immediately if:   · You have a severe headache, confusion, or difficulty speaking  · You have weakness on one side of your body  · You have chest pain, sweating, or shortness of breath  Contact your healthcare provider if:   · You have symptoms of gallbladder or liver disease, such as pain in your upper abdomen  · You have knee or hip pain and discomfort while walking  · You have symptoms of diabetes, such as intense hunger and thirst, and frequent urination  · You have symptoms of sleep apnea, such as snoring or daytime sleepiness  · You have questions or concerns about your condition or care  Treatment for obesity  focuses on helping you lose weight to improve your health  Even a small decrease in BMI can reduce the risk for many health problems  Your healthcare provider will help you set a weight-loss goal   · Lifestyle changes  are the first step in treating obesity  These include making healthy food choices and getting regular physical activity  Your healthcare provider may suggest a weight-loss program that involves coaching, education, and therapy  · Medicine  may help you lose weight when it is used with a healthy diet and physical activity  · Surgery  can help you lose weight if you are very obese and have other health problems  There are several types of weight-loss surgery  Ask your healthcare provider for more information    Be successful losing weight:   · Set small, realistic goals  An example of a small goal is to walk for 20 minutes 5 days a week  Anther goal is to lose 5% of your body weight  · Tell friends, family members, and coworkers about your goals  and ask for their support  Ask a friend to lose weight with you, or join a weight-loss support group  · Identify foods or triggers that may cause you to overeat , and find ways to avoid them  Remove tempting high-calorie foods from your home and workplace  Place a bowl of fresh fruit on your kitchen counter  If stress causes you to eat, then find other ways to cope with stress  · Keep a diary to track what you eat and drink  Also write down how many minutes of physical activity you do each day  Weigh yourself once a week and record it in your diary  Eating changes: You will need to eat 500 to 1,000 fewer calories each day than you currently eat to lose 1 to 2 pounds a week  The following changes will help you cut calories:  · Eat smaller portions  Use small plates, no larger than 9 inches in diameter  Fill your plate half full of fruits and vegetables  Measure your food using measuring cups until you know what a serving size looks like  · Eat 3 meals and 1 or 2 snacks each day  Plan your meals in advance  Shun Lambing and eat at home most of the time  Eat slowly  · Eat fruits and vegetables at every meal   They are low in calories and high in fiber, which makes you feel full  Do not add butter, margarine, or cream sauce to vegetables  Use herbs to season steamed vegetables  · Eat less fat and fewer fried foods  Eat more baked or grilled chicken and fish  These protein sources are lower in calories and fat than red meat  Limit fast food  Dress your salads with olive oil and vinegar instead of bottled dressing  · Limit the amount of sugar you eat  Do not drink sugary beverages  Limit alcohol  Activity changes:  Physical activity is good for your body in many ways   It helps you burn calories and build strong muscles  It decreases stress and depression, and improves your mood  It can also help you sleep better  Talk to your healthcare provider before you begin an exercise program   · Exercise for at least 30 minutes 5 days a week  Start slowly  Set aside time each day for physical activity that you enjoy and that is convenient for you  It is best to do both weight training and an activity that increases your heart rate, such as walking, bicycling, or swimming  · Find ways to be more active  Do yard work and housecleaning  Walk up the stairs instead of using elevators  Spend your leisure time going to events that require walking, such as outdoor festivals or fairs  This extra physical activity can help you lose weight and keep it off  Follow up with your healthcare provider as directed: You may need to meet with a dietitian  Write down your questions so you remember to ask them during your visits  © 2017 [address] [employer] Information is for End User's use only and may not be sold, redistributed or otherwise used for commercial purposes  All illustrations and images included in CareNotes® are the copyrighted property of [employer]  or Tino Gomez  The above information is an  only  It is not intended as medical advice for individual conditions or treatments  Talk to your doctor, nurse or pharmacist before following any medical regimen to see if it is safe and effective for you  Urinary Incontinence   WHAT YOU NEED TO KNOW:   What is urinary incontinence? Urinary incontinence (UI) is when you lose control of your bladder  What causes UI? UI occurs because your bladder cannot store or empty urine properly  The following are the most common types of UI:  · Stress incontinence  is when you leak urine due to increased bladder pressure  This may happen when you cough, sneeze, or exercise       · Urge incontinence  is when you feel the need to urinate right away and leak urine accidentally  · Mixed incontinence  is when you have both stress and urge UI  What are the signs and symptoms of UI?   · You feel like your bladder does not empty completely when you urinate  · You urinate often and need to urinate immediately  · You leak urine when you sleep, or you wake up with the urge to urinate  · You leak urine when you cough, sneeze, exercise, or laugh  How is UI diagnosed? Your healthcare provider will ask how often you leak urine and whether you have stress or urge symptoms  Tell him which medicines you take, how often you urinate, and how much liquid you drink each day  You may need any of the following tests:  · Urine tests  may show infection or kidney function  · A pelvic exam  may be done to check for blockages  A pelvic exam will also show if your bladder, uterus, or other organs have moved out of place  · An x-ray, ultrasound, or CT  may show problems with parts of your urinary system  You may be given contrast liquid to help your organs show up better in the pictures  Tell the healthcare provider if you have ever had an allergic reaction to contrast liquid  Do not enter the MRI room with anything metal  Metal can cause serious injury  Tell the healthcare provider if you have any metal in or on your body  · A bladder scan  will show how much urine is left in your bladder after you urinate  You will be asked to urinate and then healthcare providers will use a small ultrasound machine to check the urine left in your bladder  · Cystometry  is used to check the function of your urinary system  Your healthcare provider checks the pressure in your bladder while filling it with fluid  Your bladder pressure may also be tested when your bladder is full and while you urinate  How is UI treated? · Medicines  can help strengthen your bladder control      · Electrical stimulation  is used to send a small amount of electrical energy to your pelvic floor muscles  This helps control your bladder function  Electrodes may be placed outside your body or in your rectum  For women, the electrodes may be placed in the vagina  · A bulking agent  may be injected into the wall of your urethra to make it thicker  This helps keep your urethra closed and decreases urine leakage  · Devices  such as a clamp, pessary, or tampon may help stop urine leaks  Ask your healthcare provider for more information about these and other devices  · Surgery  may be needed if other treatments do not work  Several types of surgery can help improve your bladder control  Ask your healthcare provider for more information about the surgery you may need  How can I manage my symptoms? · Do pelvic muscle exercises often  Your pelvic muscles help you stop urinating  Squeeze these muscles tight for 5 seconds, then relax for 5 seconds  Gradually work up to squeezing for 10 seconds  Do 3 sets of 15 repetitions a day, or as directed  This will help strengthen your pelvic muscles and improve bladder control  · A catheter  may be used to help empty your bladder  A catheter is a tiny, plastic tube that is put into your bladder to drain your urine  Your healthcare provider may tell you to use a catheter to prevent your bladder from getting too full and leaking urine  · Keep a UI record  Write down how often you leak urine and how much you leak  Make a note of what you were doing when you leaked urine  · Train your bladder  Go to the bathroom at set times, such as every 2 hours, even if you do not feel the urge to go  You can also try to hold your urine when you feel the urge to go  For example, hold your urine for 5 minutes when you feel the urge to go  As that becomes easier, hold your urine for 10 minutes  · Drink liquids as directed  Ask your healthcare provider how much liquid to drink each day and which liquids are best for you   You may need to limit the amount of liquid you drink to help control your urine leakage  Limit or do not have drinks that contain caffeine or alcohol  Do not drink any liquid right before you go to bed  · Prevent constipation  Eat a variety of high-fiber foods  Good examples are high-fiber cereals, beans, vegetables, and whole-grain breads  Prune juice may help make your bowel movement softer  Walking is the best way to trigger your intestines to have a bowel movement  · Exercise regularly and maintain a healthy weight  Ask your healthcare provider how much you should weigh and about the best exercise plan for you  Weight loss and exercise will decrease pressure on your bladder and help you control your leakage  Ask him to help you create a weight loss plan if you are overweight  When should I seek immediate care? · You have severe pain  · You are confused or cannot think clearly  When should I contact my healthcare provider? · You have a fever  · You see blood in your urine  · You have pain when you urinate  · You have new or worse pain, even after treatment  · Your mouth feels dry or you have vision changes  · Your urine is cloudy or smells bad  · You have questions or concerns about your condition or care  CARE AGREEMENT:   You have the right to help plan your care  Learn about your health condition and how it may be treated  Discuss treatment options with your caregivers to decide what care you want to receive  You always have the right to refuse treatment  The above information is an  only  It is not intended as medical advice for individual conditions or treatments  Talk to your doctor, nurse or pharmacist before following any medical regimen to see if it is safe and effective for you  © 2017 [address] [employer] Information is for End User's use only and may not be sold, redistributed or otherwise used for commercial purposes   All illustrations and images included in CareNotes® are the copyrighted property of [employer] M [employer] Inc  or Tino Gomez  Cigarette Smoking and Your Health   AMBULATORY CARE:   Risks to your health if you smoke:  Nicotine and other chemicals found in tobacco damage every cell in your body  Even if you are a light smoker, you have an increased risk for cancer, heart disease, and lung disease  If you are pregnant or have diabetes, smoking increases your risk for complications  Benefits to your health if you stop smoking:   · You decrease respiratory symptoms such as coughing, wheezing, and shortness of breath  · You reduce your risk for cancers of the lung, mouth, throat, kidney, bladder, pancreas, stomach, and cervix  If you already have cancer, you increase the benefits of chemotherapy  You also reduce your risk for cancer returning or a second cancer from developing  · You reduce your risk for heart disease, blood clots, heart attack, and stroke  · You reduce your risk for lung infections, and diseases such as pneumonia, asthma, chronic bronchitis, and emphysema  · Your circulation improves  More oxygen can be delivered to your body  If you have diabetes, you lower your risk for complications, such as kidney, artery, and eye diseases  You also lower your risk for nerve damage  Nerve damage can lead to amputations, poor vision, and blindness  · You improve your body's ability to heal and to fight infections  Benefits to the health of others if you stop smoking:  Tobacco is harmful to nonsmokers who breathe in your secondhand smoke  The following are ways the health of others around you may improve when you stop smoking:  · You lower the risks for lung cancer and heart disease in nonsmoking adults  · If you are pregnant, you lower the risk for miscarriage, early delivery, low birth weight, and stillbirth  You also lower your baby's risk for SIDS, obesity, developmental delay, and neurobehavioral problems, such as ADHD  · If you have children, you lower their risk for ear infections, colds, pneumonia, bronchitis, and asthma  For more information and support to stop smoking:   · Smokefree  gov  Phone: 7- 579 - 541-1229  Web Address: www smokefrMedaxion  Follow up with your healthcare provider as directed:  Write down your questions so you remember to ask them during your visits  © 2017 [address] [employer] Information is for End User's use only and may not be sold, redistributed or otherwise used for commercial purposes  All illustrations and images included in CareNotes® are the copyrighted property of [employer] M [employer]  or Tino Gomez  The above information is an  only  It is not intended as medical advice for individual conditions or treatments  Talk to your doctor, nurse or pharmacist before following any medical regimen to see if it is safe and effective for you  Fall Prevention   WHAT YOU NEED TO KNOW:   What is fall prevention? Fall prevention includes ways to make your home and other areas safer  It also includes ways you can move more carefully to prevent a fall  What increases my risk for falls? · Lack of vitamin D    · Not getting enough sleep each night    · Trouble walking or keeping your balance, or foot problems    · Health conditions that cause changes in your blood pressure, vision, or muscle strength and coordination    · Medicines that make you dizzy, weak, or sleepy    · Problems seeing clearly    · Shoes that have high heels or are not supportive    · Tripping hazards, such as items left on the floor, no handrails on the stairs, or broken steps  How can I help protect myself from falls? · Stand or sit up slowly  This may help you keep your balance and prevent falls  If you need to get up during the night, sit up first  Be sure you are fully awake before you stand  Turn on the light before you start walking  Go slowly in case you are still sleepy   Make sure you will not trip over any pets sleeping in the bedroom  · Use assistive devices as directed  Your healthcare provider may suggest that you use a cane or walker to help you keep your balance  You may need to have grab bars put in your bathroom near the toilet or in the shower  · Wear shoes that fit well and have soles that   Wear shoes both inside and outside  Use slippers with good   Do not wear shoes with high heels  · Wear a personal alarm  This is a device that allows you to call 911 if you fall and need help  Ask your healthcare provider for more information  · Stay active  Exercise can help strengthen your muscles and improve your balance  Your healthcare provider may recommend water aerobics or walking  He or she may also recommend physical therapy to improve your coordination  Never start an exercise program without talking to your healthcare provider first      · Manage medical conditions  Keep all appointments with your healthcare providers  Visit your eye doctor as directed  How can I make my home safer? · Add items to prevent falls in the bathroom  Put nonslip strips on your bath or shower floor to prevent you from slipping  Use a bath mat if you do not have carpet in the bathroom  This will prevent you from falling when you step out of the bath or shower  Use a shower seat so you do not need to stand while you shower  Sit on the toilet or a chair in your bathroom to dry yourself and put on clothing  This will prevent you from losing your balance from drying or dressing yourself while you are standing  · Keep paths clear  Remove books, shoes, and other objects from walkways and stairs  Place cords for telephones and lamps out of the way so that you do not need to walk over them  Tape them down if you cannot move them  Remove small rugs  If you cannot remove a rug, secure it with double-sided tape  This will prevent you from tripping  · Install bright lights in your home  Use night lights to help light paths to the bathroom or kitchen  Always turn on the light before you start walking  · Keep items you use often on shelves within reach  Do not use a step stool to help you reach an item  · Paint or place reflective tape on the edges of your stairs  This will help you see the stairs better  Call 911 or have someone else call if:   · You have fallen and are unconscious  · You have fallen and cannot move part of your body  Contact your healthcare provider if:   · You have fallen and have pain or a headache  · You have questions or concerns about your condition or care  CARE AGREEMENT:   You have the right to help plan your care  Learn about your health condition and how it may be treated  Discuss treatment options with your caregivers to decide what care you want to receive  You always have the right to refuse treatment  The above information is an  only  It is not intended as medical advice for individual conditions or treatments  Talk to your doctor, nurse or pharmacist before following any medical regimen to see if it is safe and effective for you  © 2017 [address] [facility] Information is for End User's use only and may not be sold, redistributed or otherwise used for commercial purposes  All illustrations and images included in CareNotes® are the copyrighted property of [employer]  or Tino Gomez  Advance Directives   WHAT YOU NEED TO KNOW:   What are advance directives? Advance directives are legal documents that state your wishes and plans for medical care  These plans are made ahead of time in case you lose your ability to make decisions for yourself  Advance directives can apply to any medical decision, such as the treatments you want, and if you want to donate organs  What are the types of advance directives? There are many types of advance directives, and each state has rules about how to use them   You may choose a combination of any of the following:  · Living will: This is a written record of the treatment you want  You can also choose which treatments you do not want, which to limit, and which to stop at a certain time  This includes surgery, medicine, IV fluid, and tube feedings  · Durable power of  for healthcare [facility]): This is a written record that states who you want to make healthcare choices for you when you are unable to make them for yourself  This person, called a proxy, is usually a family member or a friend  You may choose more than 1 proxy  · Do not resuscitate (DNR) order:  A DNR order is used in case your heart stops beating or you stop breathing  It is a request not to have certain forms of treatment, such as CPR  A DNR order may be included in other types of advance directives  · Medical directive: This covers the care that you want if you are in a coma, near death, or unable to make decisions for yourself  You can list the treatments you want for each condition  Treatment may include pain medicine, surgery, blood transfusions, dialysis, IV or tube feedings, and a ventilator (breathing machine)  · Values history: This document has questions about your views, beliefs, and how you feel and think about life  This information can help others choose the care that you would choose  Why are advance directives important? An advance directive helps you control your care  Although spoken wishes may be used, it is better to have your wishes written down  Spoken wishes can be misunderstood, or not followed  Treatments may be given even if you do not want them  An advance directive may make it easier for your family to make difficult choices about your care  How do I decide what to put in my advance directives? · Make informed decisions:  Make sure you fully understand treatments or care you may receive   Think about the benefits and problems your decisions could cause for you or your family  Talk to healthcare providers if you have concerns or questions before you write down your wishes  You may also want to talk with your Buddhist or , or a   Check your state laws to make sure that what you put in your advance directive is legal      · Sign all forms:  Sign and date your advance directive when you have finished  You may also need 2 witnesses to sign the forms  Witnesses cannot be your doctor or his staff, your spouse, heirs or beneficiaries, people you owe money to, or your chosen proxy  Talk to your family, proxy, and healthcare providers about your advance directive  Give each person a copy, and keep one for yourself in a place you can get to easily  Do not keep it hidden or locked away  · Review and revise your plans: You can revise your advance directive at any time, as long as you are able to make decisions  Review your plan every year, and when there are changes in your life, or your health  When you make changes, let your family, proxy, and healthcare providers know  Give each a new copy  Where can I find more information? · [employer]  [address] , [address]   Phone: 2- 258 - 288-3243  Phone: 6- 872 - 654-3160  Web Address: http://www  aafp org  · [address] [facility])  [address] [facility], [address]  Phone: 3- 847 - [phone]  Phone: [phone]  Web Address: Vitaly potts  [facility] AGREEMENT:   You have the right to help plan your care  To help with this plan, you must learn about your health condition and treatment options  You must also learn about advance directives and how they are used  Work with your healthcare providers to decide what care will be used to treat you  You always have the right to refuse treatment  The above information is an  only   It is not intended as medical advice for individual conditions or treatments  Talk to your doctor, nurse or pharmacist before following any medical regimen to see if it is safe and effective for you  © 2017 [address] Information is for End User's use only and may not be sold, redistributed or otherwise used for commercial purposes  All illustrations and images included in CareNotes® are the copyrighted property of A D A M , [employer]  or Tino Gomez

## 2019-09-10 NOTE — PROGRESS NOTES
Patient to the unit for Xgeva injection  Tolerated well  Placed in left arm  Patient denies any dental concerns  Cr Cl 97 3 and Ca level 8 5  AVS given with next appointment    Voices no questions or concerns

## 2019-09-11 NOTE — PROGRESS NOTES
Assessment/Plan:      Annual Medicare wellness completed  Patient has living will and advanced directive  TSH was within normal range  Hold further testing on lipid profile secondary to metastatic prostate cancer  Most important patient needs good quality of life over quantity  Patient is having difficulty with right leg pain and possible sciatic issues  Recommend physical therapy for right leg pain and gait training and strengthening  Patient will continue with oncologist   Refer to Ear Nose and Throat regarding hearing evaluation  Recommend flu shot in the fall     Diagnoses and all orders for this visit:    Health care maintenance    Hyperlipidemia, acquired  -     levothyroxine 75 mcg tablet; Take 1 tablet (75 mcg total) by mouth daily    Hypothyroidism, unspecified type  -     TSH, 3rd generation; Future    Class 2 obesity due to excess calories without serious comorbidity with body mass index (BMI) of 38 0 to 38 9 in adult    Metastatic disease (HCC)    Decreased hearing, bilateral    Bone metastases ([address] )    Acquired hypothyroidism        1  Health care maintenance     2  Hyperlipidemia, acquired  levothyroxine 75 mcg tablet   3  Hypothyroidism, unspecified type  TSH, 3rd generation   4  Class 2 obesity due to excess calories without serious comorbidity with body mass index (BMI) of 38 0 to 38 9 in adult     5  Metastatic disease ([address] )     6  Decreased hearing, bilateral     7  Bone metastases ([address] )     8  Acquired hypothyroidism         Subjective:        Patient ID: Magnus Gao is a 81 y o  male  Chief Complaint   Patient presents with    Medicare Wellness Visit     discuss BW, pt's wife states that the pt is hard of hearing, so pt wife would like his ears looked at  pt would like to discuss to apply for handicap parking    Immunizations     discuss flu vaccine         Patient here for Medicare wellness  Unfortunately has metastatic prostate cancer    Under cancer care with [facility]  Has some edema in his legs  The following portions of the patient's history were reviewed and updated as appropriate: past medical history, past surgical history and problem list       Review of Systems   Constitutional: Positive for appetite change  Negative for fatigue, fever and unexpected weight change  HENT: Negative for congestion, ear pain, postnasal drip, rhinorrhea, sinus pressure, sinus pain and sore throat  Eyes: Negative for redness and visual disturbance  Respiratory: Negative for chest tightness and shortness of breath  Cardiovascular: Positive for leg swelling  Negative for chest pain and palpitations  Gastrointestinal: Negative for abdominal distention, abdominal pain, diarrhea and nausea  Endocrine: Negative for cold intolerance and heat intolerance  Genitourinary: Negative for dysuria and hematuria  Musculoskeletal: Positive for gait problem  Negative for arthralgias and myalgias  Skin: Negative for pallor and rash  Neurological: Positive for weakness  Negative for dizziness and headaches  Psychiatric/Behavioral: Negative for behavioral problems  The patient is not nervous/anxious  Objective:  /50 (BP Location: Left arm, Patient Position: Sitting, Cuff Size: Standard)   Pulse (!) 111   Temp 97 5 °F (36 4 °C)   Ht 5' 8 5" (1 74 m)   Wt 94 1 kg (207 lb 6 4 oz)   SpO2 97%   BMI 31 08 kg/m²        Physical Exam   Constitutional: He is oriented to person, place, and time  He appears well-nourished  No distress  Obese   HENT:   Head: Normocephalic and atraumatic  Right Ear: Tympanic membrane normal    Left Ear: Tympanic membrane normal    Eyes: Pupils are equal, round, and reactive to light  Conjunctivae and EOM are normal  No scleral icterus  Neck: Normal range of motion  Neck supple  No thyromegaly present  Cardiovascular: Normal rate, regular rhythm and intact distal pulses  No murmur heard    Pulses:       Carotid pulses are 0 on the right side, and 0 on the left side  Pulmonary/Chest: Effort normal and breath sounds normal  He has no wheezes  Abdominal: Soft  He exhibits no distension  There is no tenderness  Musculoskeletal:    Heavy thick legs with trace edema  Needs walker   Lymphadenopathy:     He has no cervical adenopathy  Neurological: He is alert and oriented to person, place, and time  He has normal reflexes  No cranial nerve deficit  Coordination normal    Skin: Skin is warm  No pallor  Psychiatric: He has a normal mood and affect  His behavior is normal  Thought content normal    Nursing note and vitals reviewed

## 2019-09-19 NOTE — PROGRESS NOTES
Per Dr Tony Daily schedule patient for 1 unit PRBCs (leukoreduced) at [facility]  Called and scheduled patient with Dawson Alexandra @ [facility] for 9/20 at 8am (same day type and screen in addition to 1 unit PRBCs)  Called patient and reviewed hgb of 7 9 and patient reports overall he is not symptomatic  Reviewed Dr Tony Daily would like patient to get 1 unit PRBCs tomorrow [facility] 8am  Patient agreeable and will go to infusion tomorrow for type and screen and unit of blood

## 2019-09-20 NOTE — PLAN OF CARE
Problem: PAIN - ADULT  Goal: Verbalizes/displays adequate comfort level or baseline comfort level  Description  Interventions:  - Encourage patient to monitor pain and request assistance  - Assess pain using appropriate pain scale  - Administer analgesics based on type and severity of pain and evaluate response  - Implement non-pharmacological measures as appropriate and evaluate response  - Consider cultural and social influences on pain and pain management  - Notify physician/advanced practitioner if interventions unsuccessful or patient reports new pain  Outcome: Progressing     Problem: INFECTION - ADULT  Goal: Absence or prevention of progression during hospitalization  Description  INTERVENTIONS:  - Assess and monitor for signs and symptoms of infection  - Monitor lab/diagnostic results  - Monitor all insertion sites, i e  indwelling lines, tubes, and drains  - Monitor endotracheal if appropriate and nasal secretions for changes in amount and color  - [place] appropriate cooling/warming therapies per order  - Administer medications as ordered  - Instruct and encourage patient and family to use good hand hygiene technique  - Identify and instruct in appropriate isolation precautions for identified infection/condition  Outcome: Progressing  Goal: Absence of fever/infection during neutropenic period  Description  INTERVENTIONS:  - Monitor WBC    Outcome: Progressing     Problem: Knowledge Deficit  Goal: Patient/family/caregiver demonstrates understanding of disease process, treatment plan, medications, and discharge instructions  Description  Complete learning assessment and assess knowledge base    Interventions:  - Provide teaching at level of understanding  - Provide teaching via preferred learning methods  Outcome: Progressing

## 2019-09-20 NOTE — PROGRESS NOTES
Pt  Denied symptoms related to decreased Hgb  Type and Cross obtained on arrival as ordered  Tylenol provided as ordered prior to transfusion  Pt  Tolerated 1 unit of PRBCs without adverse event  Future appointments reviewed    Declined AVS

## 2019-10-04 NOTE — TELEPHONE ENCOUNTER
Generic Lexapro was sent to pharmacy  The prescription will say 1 tablet daily but I would like him to take half a tablet daily for the 1st week and then give us a phone call before going to the full dose  I would then need to see him in about a month to see how he is doing    If he is having any unusual side effects he should call

## 2019-10-04 NOTE — TELEPHONE ENCOUNTER
Patient would like to go on a mild antidepressant    Do you need to see him again or can you just call something in for him?    [address] [place] [address] in [place]

## 2019-10-09 NOTE — PLAN OF CARE
Problem: Potential for Falls  Goal: Patient will remain free of falls  Description  INTERVENTIONS:  - Assess patient frequently for physical needs  -  Identify cognitive and physical deficits and behaviors that affect risk of falls    -  [place] fall precautions as indicated by assessment   - Educate patient/family on patient safety including physical limitations  - Instruct patient to call for assistance with activity based on assessment  - Modify environment to reduce risk of injury  - Consider OT/PT consult to assist with strengthening/mobility  10/9/2019 1325 by Sandrine Aiken RN  Outcome: Progressing  10/9/2019 1325 by Sandrine Aiken RN  Outcome: Progressing

## 2019-10-09 NOTE — PROGRESS NOTES
Calcium 8 5, creatinine   55, pt within parameters for xgeva  Pt has labs drawn Q4 weeks  Pt given xgeva sq as ordered in left upper arm    Pt and wife provided with AVS

## 2019-10-09 NOTE — PLAN OF CARE
Problem: Potential for Falls  Goal: Patient will remain free of falls  Description  INTERVENTIONS:  - Assess patient frequently for physical needs  -  Identify cognitive and physical deficits and behaviors that affect risk of falls    -  [place] fall precautions as indicated by assessment   - Educate patient/family on patient safety including physical limitations  - Instruct patient to call for assistance with activity based on assessment  - Modify environment to reduce risk of injury  - Consider OT/PT consult to assist with strengthening/mobility  Outcome: Progressing

## 2019-10-14 NOTE — TELEPHONE ENCOUNTER
[facility] YON and call returned to patient  No answer left contact info to call back

## 2019-10-14 NOTE — TELEPHONE ENCOUNTER
Patient's wife called in stated that she has questions in regards to upcoming labs   A good call back number for Alethea Shipman is [phone]  o

## 2019-10-14 NOTE — TELEPHONE ENCOUNTER
Call back received from wife stating patient is due labs tomorrow and patient usually gets a type and screen done in addition to his labs  TC to office spoke to Christopher Gutierrez MA and discussed concern  Okay to put in order for tomorrow  MD will reassess the need for type and screen with next visit  TC to wife to advise of same  She is in agreement with plan at this time

## 2019-10-23 NOTE — PROGRESS NOTES
Assessment/Plan:    Patient not necessarily better on Lexapro since being started  It is only been 2 weeks  Recommend at least another week or 2 before determining change  Gene sight testing reviewed at length and completed today  Will await findings  Patient continues to follow up with regards to prostate cancer and metastatic disease  Time spent approximately 25 minutes with greater than 80% counseling     Diagnoses and all orders for this visit:    Reactive depression    Ambulatory dysfunction  -     Walker    Prostate cancer ([facility] )  -     [place]    Metastatic disease ([facility] )    Encounter for immunization  -     influenza vaccine, 0907-3307, high-dose, PF 0 5 mL (FLUZONE HIGH-DOSE)        1  Reactive depression     2  Ambulatory dysfunction  Walker   3  Prostate cancer ([facility] )  Walker   4  Metastatic disease ([facility] )     5  Encounter for immunization  influenza vaccine, [id number], high-dose, PF 0 5 mL (FLUZONE HIGH-DOSE)       Subjective:        Patient ID: Yen Valentino is a 81 y o  male  Chief Complaint   Patient presents with    Follow-up     2 week check; discuss medication, pt is taking lexapro and does not feel any different     Immunizations     flu vaccine       Patient here for recheck since starting Lexapro for mood related to reactive depression secondary to metastatic prostate cancer  Cannot really tell if anything has really changed since starting medication  The following portions of the patient's history were reviewed and updated as appropriate: past medical history, past surgical history and problem list       Review of Systems   Constitutional: Negative for fatigue  Eyes: Negative for visual disturbance  Respiratory: Negative for cough and shortness of breath  Cardiovascular: Negative for chest pain, palpitations and leg swelling  Gastrointestinal: Negative for abdominal pain  Musculoskeletal: Positive for back pain and gait problem     Neurological: Negative for dizziness and headaches  Psychiatric/Behavioral: Positive for dysphoric mood  The patient is not nervous/anxious  Objective:  /50 (BP Location: Left arm, Patient Position: Sitting, Cuff Size: Standard)   Pulse 92   Temp 97 7 °F (36 5 °C)   Ht 5' 8" (1 727 m)   Wt 89 9 kg (198 lb 3 2 oz)   SpO2 98%   BMI 30 14 kg/m²        Physical Exam   Constitutional: He is oriented to person, place, and time  He appears well-nourished  No distress  HENT:   Head: Normocephalic and atraumatic  Right Ear: Tympanic membrane normal    Left Ear: Tympanic membrane normal    Mouth/Throat: Oropharynx is clear and moist    Eyes: Pupils are equal, round, and reactive to light  Conjunctivae and EOM are normal  No scleral icterus  Neck: Normal range of motion  Neck supple  No thyromegaly present  Cardiovascular: Normal rate, regular rhythm and normal heart sounds  No murmur heard  Pulses:       Carotid pulses are 0 on the right side, and 0 on the left side  Pulmonary/Chest: Effort normal and breath sounds normal  No respiratory distress  He has no wheezes  Abdominal: Soft  He exhibits no distension  Musculoskeletal: He exhibits no edema  Lymphadenopathy:     He has no cervical adenopathy  Neurological: He is alert and oriented to person, place, and time  He has normal reflexes  No cranial nerve deficit  Skin: Skin is warm  No pallor  Psychiatric: He has a normal mood and affect  His behavior is normal  Judgment and thought content normal    Nursing note and vitals reviewed

## 2019-10-31 NOTE — TELEPHONE ENCOUNTER
Called and spoke to patient wife and informed her that Panchito Montano appt needed to be R/S that was on 11/6/19  His new appt is 11/13/19 at 10:30 am with KATHY Richey at the [facility] office patient was fine with appt change

## 2019-11-04 NOTE — TELEPHONE ENCOUNTER
Spoke to pt's wife and is aware, she states she will give us a call in 4 weeks with an update   Please advise and authorize, thanks

## 2019-11-04 NOTE — TELEPHONE ENCOUNTER
Terri Morales called to give you an update on the Lexapro  Joyce Linn does not feel any difference  Please advise        [phone]

## 2019-11-04 NOTE — TELEPHONE ENCOUNTER
I would let the wife know that the Lexapro came back in the yellow zone on the Gene sight testing and if willing I would like to switch him to Zoloft which was on the Green column  If interested he can stop Lexapro and start Zoloft the next day at 50 mg  Number 30 with 2 refills    Would like a call back in 4 weeks or office visit to see how he is doing on that med

## 2019-11-04 NOTE — TELEPHONE ENCOUNTER
Can be double check how long he has been on the 10 mg dose  We started him at 5 mg and then went up to 10 mg  It will take about 3-4 weeks to kick in at the 10 mg dose    Please find out how long

## 2019-11-05 PROBLEM — H05.89 MASS OF ORBIT: Status: ACTIVE | Noted: 2019-01-01

## 2019-11-05 PROBLEM — F41.9 ANXIETY AND DEPRESSION: Status: ACTIVE | Noted: 2019-01-01

## 2019-11-05 PROBLEM — F32.A ANXIETY AND DEPRESSION: Status: ACTIVE | Noted: 2019-01-01

## 2019-11-05 PROBLEM — E87.1 HYPONATREMIA: Status: ACTIVE | Noted: 2019-01-01

## 2019-11-05 PROBLEM — C61 PROSTATE CANCER METASTATIC TO BONE (HCC): Status: ACTIVE | Noted: 2017-08-09

## 2019-11-05 PROBLEM — R26.2 AMBULATORY DYSFUNCTION: Status: ACTIVE | Noted: 2019-01-01

## 2019-11-05 NOTE — ASSESSMENT & PLAN NOTE
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

## 2019-11-05 NOTE — ASSESSMENT & PLAN NOTE
Presenting with a sodium of 129  Likely secondary to dehydration  Will provide gentle IV fluids at 50 mL/hour  Recheck in a m

## 2019-11-05 NOTE — ASSESSMENT & PLAN NOTE
Incidental finding on CT of head  Concerning given history of metastatic prostate cancer  Soft tissue in the superior extraocular space of the right orbit suggestive of mass  MRI of orbits with contrast recommended for further evaluation  Will need oupt followup for this

## 2019-11-05 NOTE — ASSESSMENT & PLAN NOTE
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

## 2019-11-05 NOTE — PLAN OF CARE
Problem: Potential for Falls  Goal: Patient will remain free of falls  Description  INTERVENTIONS:  - Assess patient frequently for physical needs  -  Identify cognitive and physical deficits and behaviors that affect risk of falls    -  [place] fall precautions as indicated by assessment   - Educate patient/family on patient safety including physical limitations  - Instruct patient to call for assistance with activity based on assessment  - Modify environment to reduce risk of injury  - Consider OT/PT consult to assist with strengthening/mobility  Outcome: Progressing

## 2019-11-05 NOTE — ASSESSMENT & PLAN NOTE
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

## 2019-11-05 NOTE — H&P
H&P- Yen Yi 1935, 81 y o  male MRN: [id number]    Unit/Bed#: E5 -01 Encounter: [id number]    Primary Care Provider: Gualberto Barry DO   Date and time admitted to hospital: 11/5/2019  1:46 PM    * Ambulatory dysfunction  Assessment & Plan  Fall last night and through earlier today  Per wife patient hit his head  Likely secondary to deconditioning in setting of metastatic cancer  Complains of generalized weakness and lack of strength  CT head with no acute intracranial abnormality  CT cervical spine with no fracture or traumatic malalignment  Diffuse sclerotic changes throughout the vertebral bodies and posterior elements keeping with metastatic prostate cancer  Have PT/OT evaluate    Mass of orbit  Assessment & Plan  Incidental finding on CT of head  Concerning given history of metastatic prostate cancer  Soft tissue in the superior extraocular space of the right orbit suggestive of mass  MRI of orbits with contrast recommended for further evaluation  Will need oupt followup for this  Anemia, unspecified  Assessment & Plan  Follows with Dr Inés Abbott of Oncology  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  Last transfusion was 9/2019  Hemoglobin on admission 7 9  Anemia likely contributing to patients weakness  Will transfuse with one unit of PRBCs    Prostate cancer metastatic to bone [facility])  Assessment & Plan  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  Initially diagnosised in 2018  Follows with Dr Inés Abbott of Oncology  Currently on Lupron q 6 months, Xgeva every 3 months,  Xtandi 160 mg daily  Anxiety and depression  Assessment & Plan  Recently started on Zoloft    Hyponatremia  Assessment & Plan  Presenting with a sodium of 129  Likely secondary to dehydration  Will provide gentle IV fluids at 50 mL/hour  Recheck in a m      Acquired hypothyroidism  Assessment & Plan  Continue levothyroxine    Macular degeneration  Assessment & Plan  Poor eyesight at baseline  Continue home eye drops  Follows with Dr Tammi Nicole as an outpt and recieves left sided eye injections  Per family he has developed a chronic left eye droop as a result of these  VTE Prophylaxis: Enoxaparin (Lovenox)  / sequential compression device   Code Status: full code  Anticipated Length of Stay:  Patient will be admitted on an Inpatient basis with an anticipated length of stay of  Greater than 2 midnights  Justification for Hospital Stay: ambulatory dysfunction/deconditioning with need for further evaluation by PT/OT    Chief Complaint:   Falls at home    History of Present Illness:    Aarti Delatorre is a 81 y o  male with a PMH of confusion dependent anemia, macular degeneration, glaucoma, hypothyroidism, prostate cancer with mets to bone currently undergoing treatment by Dr Su Cox  He presents with a complaint of increasing generalized weakness at home  Yesterday he was getting up from the commode and lost his balance  He fell to the floor  He was unable to get up on his own and needed family to assist him to a chair  Today he was going to sit down in the chair when he lost his balance and fell backwards  He hit the back of his head  Family brought him to the ED for further workup and evaluation given concern for frequent falls at home and concern for safety  He is currently undergoing acute oral chemotherapy by Dr Su Cox as well as Lupron shots  He is transfusion dependent and last received a transfusion 9/20/19  Overall decreased appetite and minimal water intake the past few days  Denies any chest pain, chest pressure, palpitations, nausea or vomiting  No fever, chills, or diarrhea  Lives at home with wife  Uses a rollator to ambulate as baseline  Review of Systems:    General:   No Fever or chills; + generalized weakness   EENT:   No ear pain, facial swelling; No sneezing, sore throat     Skin:   No rashes, color changes  Respiratory:     No shortness of breath, cough, wheezing, stridor  Cardiovascular:     No chest pain, palpitations  Gastrointestinal:    No nausea, vomiting, diarrhea; No abdominal pain  Musculoskeletal:     No arthralgias, myalgias, swelling  Neurologic:   No dizziness, numbness, weakness  No speech difficulties  Psych:   No agitation,     Otherwise, All other twelve-point review of systems normal      Past Medical and Surgical History:     Past Medical History:   Diagnosis Date    Acute bronchitis     Hypertension     Prostate CA ([address] )     Urinary frequency     UTI (urinary tract infection)        Past Surgical History:   Procedure Laterality Date    CHOLECYSTECTOMY      COLONOSCOPY      [id number] ([place])       Meds/Allergies:    No current facility-administered medications for this encounter  No Known Allergies    Allergies: No Known Allergies    Social History:     Marital Status: /Civil Union     Substance Use History:   Social History     Substance and Sexual Activity   Alcohol Use Yes    Comment: 1/day  Per [employer]: Social     Social History     Tobacco Use   Smoking Status Former Smoker    Last attempt to quit:     Years since quittin 8   Smokeless Tobacco Never Used     Social History     Substance and Sexual Activity   Drug Use No       Family History:    non-contributory    Physical Exam:     Vitals:   Blood Pressure: 129/86 (19 174)  Pulse: 100 (19 174)  Temperature: 98 2 °F (36 8 °C) (19)  Temp Source: Temporal (19)  Respirations: 17 (19)  Weight - Scale: 95 4 kg (210 lb 5 1 oz) (19 1351)  SpO2: 96 % (19 174)    Physical Exam   Constitutional: He is oriented to person, place, and time  He appears well-developed and well-nourished  No distress  HENT:   Head: Normocephalic and atraumatic  Cardiovascular: Normal rate, regular rhythm and normal heart sounds     Pulmonary/Chest: Effort normal and breath sounds normal  No stridor  No respiratory distress  He has no wheezes  He has no rales  He exhibits no tenderness  Abdominal: Soft  Bowel sounds are normal  He exhibits no distension and no mass  There is no tenderness  There is no rebound and no guarding  No hernia  Neurological: He is alert and oriented to person, place, and time  No cranial nerve deficit or sensory deficit  Coordination normal    Chronic left sided eye lid droop- normal per family at bedside   Skin: Skin is dry  He is not diaphoretic  Nursing note and vitals reviewed  Additional Data:     Lab Results: I have personally reviewed pertinent reports  Results from last 7 days   Lab Units 11/05/19  1425   WBC Thousand/uL 3 71*   HEMOGLOBIN g/dL 7 9*   HEMATOCRIT % 26 1*   PLATELETS Thousands/uL 106*   NEUTROS PCT % 71   LYMPHS PCT % 17   MONOS PCT % 9   EOS PCT % 1     Results from last 7 days   Lab Units 11/05/19  1425   POTASSIUM mmol/L 4 0   CHLORIDE mmol/L 95*   CO2 mmol/L 29   BUN mg/dL 14   CREATININE mg/dL 0 50*   CALCIUM mg/dL 8 7   ALK PHOS U/L 296*   ALT U/L 13   AST U/L 35     Results from last 7 days   Lab Units 11/05/19  1425   INR  1 11       Imaging: I have personally reviewed pertinent reports  Xr Chest 2 Views    Result Date: 11/5/2019  Narrative: CHEST INDICATION:   weakness  COMPARISON:  5/22/2018  EXAM PERFORMED/VIEWS:  XR CHEST PA & LATERAL FINDINGS: Cardiomediastinal silhouette appears unremarkable  The lungs are clear  No pneumothorax or pleural effusion  Stable elevated right hemidiaphragm  Multiple old right rib fractures again seen  Impression: No acute cardiopulmonary disease  Workstation performed: [id number]     Ct Head Without Contrast    Result Date: 11/5/2019  Narrative: CT BRAIN - WITHOUT CONTRAST INDICATION:   multiple falls  COMPARISON:  None  TECHNIQUE:  CT examination of the brain was performed    In addition to axial images, coronal 2D reformatted images were created and submitted for interpretation  Radiation dose length product (DLP) for this visit:  877 mGy-cm   This examination, like all CT scans performed in the [facility], was performed utilizing techniques to minimize radiation dose exposure, including the use of iterative reconstruction and automated exposure control  IMAGE QUALITY:  Diagnostic  FINDINGS: PARENCHYMA:  There are no coronal images in the coronal image 14 and 15, symmetric and normal in the 50s extraocular mass in the superio VENTRICLES AND EXTRA-AXIAL SPACES:  Normal for the patient's age  VISUALIZED ORBITS AND PARANASAL SINUSES:  Left maxillary sinus nearly completely impacted with mucus  Mucosal thickening involving the right maxillary, sphenoid, frontal and ethmoid sinuses  Soft tissue in the superior extraocular space of the right orbit suggestive of a mass  CALVARIUM AND EXTRACRANIAL SOFT TISSUES:  Normal      Impression: No acute intracranial abnormality  Left maxillary sinus nearly completely impacted with mucus  Mucosal thickening involving the right maxillary, sphenoid, frontal and ethmoid sinuses  Soft tissue in the superior extraocular space of the right orbit suggestive of a mass  MRI of the orbits with contrast recommended for further evaluation  The study was marked in [facility] for significant notification and follow-up  Workstation performed: [id number]     Ct Cervical Spine Without Contrast    Result Date: 11/5/2019  Narrative: CT CERVICAL SPINE - WITHOUT CONTRAST INDICATION:   fall  History of prostate cancer  COMPARISON:  None  TECHNIQUE:  CT examination of the cervical spine was performed without intravenous contrast   Contiguous axial images were obtained  Sagittal and coronal reconstructions were performed  Radiation dose length product (DLP) for this visit:  522 mGy-cm     This examination, like all CT scans performed in the [facility], was performed utilizing techniques to minimize radiation dose exposure, including the use of iterative reconstruction and automated exposure control  IMAGE QUALITY:  Diagnostic  FINDINGS: ALIGNMENT:  Normal alignment of the cervical spine  No subluxation  VERTEBRAL BODIES:  No fracture  Diffuse sclerotic changes throughout the vertebral bodies and posterior elements in keeping with metastatic prostate cancer  DEGENERATIVE CHANGES:  Moderate multilevel cervical degenerative changes are noted  No critical central canal stenosis  PREVERTEBRAL AND PARASPINAL SOFT TISSUES:  Unremarkable  THORACIC INLET:  Partially imaged density in the right apex suggestive of pleural effusion  Impression: No cervical spine fracture or traumatic malalignment  Diffuse sclerotic changes throughout the vertebral bodies and posterior elements in keeping with metastatic prostate cancer  Partially imaged density in the right apex suggestive of pleural effusion  Chest x-ray pending  Workstation performed: [id number]       EKG, Pathology, and Other Studies Reviewed on Admission:   · EK sinus    [employer] Records Reviewed: Yes     Total Time for Visit, including Counseling / Coordination of Care: 45 minutes  Greater than 50% of this total time spent on direct patient counseling and coordination of care  ** Please Note: This note has been constructed using a voice recognition system   **

## 2019-11-05 NOTE — ASSESSMENT & PLAN NOTE
Fall last night and through earlier today  Per wife patient hit his head  Likely secondary to deconditioning in setting of metastatic cancer  Complains of generalized weakness and lack of strength  CT head with no acute intracranial abnormality  CT cervical spine with no fracture or traumatic malalignment  Diffuse sclerotic changes throughout the vertebral bodies and posterior elements keeping with metastatic prostate cancer    Have PT/OT evaluate

## 2019-11-05 NOTE — ED PROVIDER NOTES
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 currently on xtandi, anemia and thrombocytopenia, hypertension, chronic kidney disease who presents with multiple falls and weakness  We states that over the past month, the patient has experienced a drastic decrease in strength  The patient lives at home with his wife  The wife states that last night, the patient fell in the bathroom in the family had a difficult time picking him up off of the floor  Today, the patient was walking with his walker to his chair  He turned around, became weak, and fell  He struck the back of his head on the floor  Denies loss of conscious or blood thinners  Family states that his physical conditioning has decreased drastically over the past month  He used to walk independently, graduated to a cane, now he is forced to walk with a walker  He does have stairs in the home but recently had a chair lift installed  The patient currently has no complaints except fatigue  Family is concerned about his safety at home  They do not believe that he can safely go home due to weakness and difficulty ambulating  Denies fever/chills, nausea/vomiting, lightheadedness/dizziness, numbness, headache, change in vision, URI symptoms, neck pain, chest pain, palpitations, shortness of breath, cough, back pain, flank pain, abdominal pain, diarrhea, hematochezia, melena, dysuria, hematuria  Prior to Admission Medications   Prescriptions Last Dose Informant Patient Reported? Taking?    Multiple Vitamins-Minerals (PRESERVISION/LUTEIN) CAPS  Self Yes Yes   Sig: Take by mouth daily   brimonidine-timolol (COMBIGAN) 0 2-0 5 %  Self Yes Yes   Sig: Administer 1 drop to both eyes every 12 (twelve) hours   calcium gluconate 500 mg tablet  Self Yes Yes   Sig: Take 500 mg by mouth daily   cholecalciferol (VITAMIN D3) 1,000 units tablet  Self Yes Yes   Sig: Take 1,000 Units by mouth daily   dorzolamide (TRUSOPT) 2 % ophthalmic solution  Self Yes Yes   Si drop 3 (three) times a day   enzalutamide (XTANDI) 40 mg capsule  Self No Yes   Sig: Take 4 capsules (160 mg total) by mouth daily   levothyroxine 75 mcg tablet   No Yes   Sig: Take 1 tablet (75 mcg total) by mouth daily   multivitamin (THERAGRAN) TABS  Self Yes Yes   Sig: Take 1 tablet by mouth daily   sertraline (ZOLOFT) 50 mg tablet   No Yes   Sig: Take 1 tablet (50 mg total) by mouth daily   travoprost (TRAVATAN Z) 0 004 % ophthalmic solution  Self Yes Yes   Si drop daily at bedtime      Facility-Administered Medications: None       Past Medical History:   Diagnosis Date    Acute bronchitis     Hypertension     Prostate CA (HCC)     Urinary frequency     UTI (urinary tract infection)        Past Surgical History:   Procedure Laterality Date    CHOLECYSTECTOMY      COLONOSCOPY      [id number] ([place])       Family History   Problem Relation Age of Onset    Breast cancer Mother     Prostate cancer Father     Prostate cancer Brother     Stroke Family         CVA    Hypertension Family      I have reviewed and agree with the history as documented  Social History     Tobacco Use    Smoking status: Former Smoker     Last attempt to quit:      Years since quittin 8    Smokeless tobacco: Never Used   Substance Use Topics    Alcohol use: Yes     Comment: 1/day  Per [employer]: Social    Drug use: No        Review of Systems   Constitutional: Positive for fatigue  Negative for chills and fever  HENT: Negative for rhinorrhea, sore throat and trouble swallowing  Eyes: Negative for photophobia and visual disturbance     Respiratory: Negative for cough, chest tightness and shortness of breath  Cardiovascular: Negative for chest pain, palpitations and leg swelling  Gastrointestinal: Negative for abdominal pain, blood in stool, diarrhea, nausea and vomiting  Endocrine: Negative for polyuria  Genitourinary: Negative for dysuria, flank pain and hematuria  Musculoskeletal: Negative for back pain and neck pain  Skin: Negative for color change and rash  Allergic/Immunologic: Negative for immunocompromised state  Neurological: Positive for weakness  Negative for dizziness, light-headedness, numbness and headaches  All other systems reviewed and are negative  Physical Exam  Physical Exam   Constitutional: He is oriented to person, place, and time  Vital signs are normal  He is cooperative  Non-toxic appearance  He does not appear ill  Chronically ill-appearing male   HENT:   Head: Normocephalic and atraumatic  Right Ear: Hearing, tympanic membrane, external ear and ear canal normal  No hemotympanum  Left Ear: Hearing, tympanic membrane, external ear and ear canal normal  No hemotympanum  Nose: Nose normal    Mouth/Throat: Uvula is midline, oropharynx is clear and moist and mucous membranes are normal  No tonsillar exudate  Eyes: Pupils are equal, round, and reactive to light  Conjunctivae, EOM and lids are normal    Neck: Trachea normal, normal range of motion and full passive range of motion without pain  Neck supple  No spinous process tenderness present  Cardiovascular: Normal rate, regular rhythm and normal pulses  Pulses:       Radial pulses are 2+ on the right side, and 2+ on the left side  Dorsalis pedis pulses are 2+ on the right side, and 2+ on the left side  Pulmonary/Chest: Effort normal and breath sounds normal  He exhibits no tenderness, no bony tenderness and no crepitus  Abdominal: Soft  Normal appearance and bowel sounds are normal  There is no tenderness   There is no rigidity, no rebound, no guarding, no CVA tenderness, no tenderness at McBurney's point and negative Resendiz's sign  Musculoskeletal:   No C-spine, T-spine, L-spine tenderness  No bony tenderness throughout but otherwise noted  No other evidence of trauma  Patient able to range all joints without pain  Pelvis is stable and nontender  Negative DARIENNE/FADIR  Neurological: He is alert and oriented to person, place, and time  He has normal strength  No cranial nerve deficit or sensory deficit  GCS eye subscore is 4  GCS verbal subscore is 5  GCS motor subscore is 6  Cranial nerves 2-12 intact, strength 5/5 throughout, sensation intact throughout  Visual fields normal    Psychiatric: He has a normal mood and affect   His speech is normal and behavior is normal  Thought content normal        Vital Signs  ED Triage Vitals   Temperature Pulse Respirations Blood Pressure SpO2   11/05/19 1351 11/05/19 1351 11/05/19 1351 11/05/19 1351 11/05/19 1351   97 8 °F (36 6 °C) 89 17 140/65 95 %      Temp Source Heart Rate Source Patient Position - Orthostatic VS BP Location FiO2 (%)   11/05/19 1351 11/05/19 1351 11/05/19 1351 11/05/19 1351 --   Oral Monitor Lying Right arm       Pain Score       11/05/19 1611       4           Vitals:    11/05/19 1351 11/05/19 1611   BP: 140/65 133/72   Pulse: 89 91   Patient Position - Orthostatic VS: Lying Lying         Visual Acuity  Visual Acuity      Most Recent Value   L Pupil Size (mm)  3   R Pupil Size (mm)  3          ED Medications  Medications - No data to display    Diagnostic Studies  Results Reviewed     Procedure Component Value Units Date/Time    Protime-INR [[id number]]  (Normal) Collected:  11/05/19 1425    Lab Status:  Final result Specimen:  Blood from Arm, Right Updated:  11/05/19 1453     Protime 14 4 seconds      INR 1 11    APTT [[id number]]  (Abnormal) Collected:  11/05/19 1425    Lab Status:  Final result Specimen:  Blood from Arm, Right Updated:  11/05/19 1453     PTT 41 seconds     Comprehensive metabolic panel [[id number]] (Abnormal) Collected:  11/05/19 1425    Lab Status:  Final result Specimen:  Blood from Arm, Right Updated:  11/05/19 1453     Sodium 129 mmol/L      Potassium 4 0 mmol/L      Chloride 95 mmol/L      CO2 29 mmol/L      ANION GAP 5 mmol/L      BUN 14 mg/dL      Creatinine 0 50 mg/dL      Glucose 128 mg/dL      Calcium 8 7 mg/dL      AST 35 U/L      ALT 13 U/L      Alkaline Phosphatase 296 U/L      Total Protein 7 1 g/dL      Albumin 2 6 g/dL      Total Bilirubin 0 34 mg/dL      eGFR 100 ml/min/1 73sq m     Narrative:       National Kid[employer] Disease Foundation guidelines for Chronic Kidney Disease (CKD):     Stage 1 with normal or high GFR (GFR > 90 mL/min/1 73 square meters)    Stage 2 Mild CKD (GFR = 60-89 mL/min/1 73 square meters)    Stage 3A Moderate CKD (GFR = 45-59 mL/min/1 73 square meters)    Stage 3B Moderate CKD (GFR = 30-44 mL/min/1 73 square meters)    Stage 4 Severe CKD (GFR = 15-29 mL/min/1 73 square meters)    Stage 5 End Stage CKD (GFR <15 mL/min/1 73 square meters)  Note: GFR calculation is accurate only with a steady state creatinine    Magnesium [[id number]]  (Normal) Collected:  11/05/19 1425    Lab Status:  Final result Specimen:  Blood from Arm, Right Updated:  11/05/19 1453     Magnesium 2 3 mg/dL     Troponin I [[id number]]  (Normal) Collected:  11/05/19 1425    Lab Status:  Final result Specimen:  Blood from Arm, Right Updated:  11/05/19 1453     Troponin I <0 02 ng/mL     CBC and differential [[id number]]  (Abnormal) Collected:  11/05/19 1425    Lab Status:  Final result Specimen:  Blood from Arm, Right Updated:  11/05/19 1433     WBC 3 71 Thousand/uL      RBC 2 50 Million/uL      Hemoglobin 7 9 g/dL      Hematocrit 26 1 %       fL      MCH 31 6 pg      MCHC 30 3 g/dL      RDW 17 8 %      MPV 8 7 fL      Platelets 226 Thousands/uL      nRBC 0 /100 WBCs      Neutrophils Relative 71 %      Immat GRANS % 1 %      Lymphocytes Relative 17 %      Monocytes Relative 9 %      Eosinophils Relative 1 %      Basophils Relative 1 %      Neutrophils Absolute 2 65 Thousands/µL      Immature Grans Absolute 0 03 Thousand/uL      Lymphocytes Absolute 0 63 Thousands/µL      Monocytes Absolute 0 33 Thousand/µL      Eosinophils Absolute 0 05 Thousand/µL      Basophils Absolute 0 02 Thousands/µL     POCT urinalysis dipstick [[id number]]     Lab Status:  No result Specimen:  Urine                  XR chest 2 views   Final Result by Pauly Velazquez MD (11/05 1543)      No acute cardiopulmonary disease  Workstation performed: [id number]         CT head without contrast   Final Result by Stefania Taveras MD (11/05 1524)      No acute intracranial abnormality  Left maxillary sinus nearly completely impacted with mucus  Mucosal thickening involving the right maxillary, sphenoid, frontal and ethmoid sinuses  Soft tissue in the superior extraocular space of the right orbit suggestive of a mass  MRI of the orbits with contrast recommended for further evaluation  The study was marked in [facility] for significant notification and follow-up  Workstation performed: [id number]         CT cervical spine without contrast   Final Result by Stefania Taveras MD (11/05 1528)      No cervical spine fracture or traumatic malalignment  Diffuse sclerotic changes throughout the vertebral bodies and posterior elements in keeping with metastatic prostate cancer  Partially imaged density in the right apex suggestive of pleural effusion  Chest x-ray pending           Workstation performed: [id number]                    Procedures  ECG 12 Lead Documentation Only  Date/Time: 11/5/2019 2:40 PM  Performed by: Devi Linton MD  Authorized by: Devi Linton MD     ECG reviewed by me, the ED Provider: yes    Patient location:  ED  Previous ECG:     Previous ECG:  Unavailable    Comparison to cardiac monitor: Yes    Interpretation:     Interpretation: non-specific    Rate:     ECG rate:  85    ECG rate assessment: normal    Rhythm:     Rhythm: sinus rhythm    Ectopy:     Ectopy: none    QRS:     QRS axis:  Normal    QRS intervals:  Normal  Conduction:     Conduction: normal    ST segments:     ST segments:  Normal  T waves:     T waves: non-specific             ED Course  ED Course as of Nov 05 1615 Tue Nov 05, 2019   1434 Stable anemia   Hemoglobin(!): 7 9   1459 Mild hyponatremia   Sodium(!): 129   1546 Stable thrombocytopenia   Platelet Count(!): 834                               MDM  Number of Diagnoses or Management Options  Diagnosis management comments: Plan to check labs, EKG, chest x-ray, CT head  Check urinalysis  The patient will ultimately need to be admitted to the hospital for ambulatory dysfunction and physical therapy evaluation  Disposition  Final diagnoses:   Multiple falls   Hyponatremia   Weakness   Ambulatory dysfunction     Time reflects when diagnosis was documented in both MDM as applicable and the Disposition within this note     Time User Action Codes Description Comment    11/5/2019  4:11 PM Robbie Carlos Add [R29 6] Multiple falls     11/5/2019  4:11 PM Robbie Carlos Add [E87 1] Hyponatremia     11/5/2019  4:11 PM Rosemary MORIN Add [R53 1] Weakness     11/5/2019  4:12 PM Robbie Carlos Add [R26 2] Ambulatory dysfunction       ED Disposition     ED Disposition Condition Date/Time Comment    Admit Stable Tue Nov 5, 2019  4:14 PM         Follow-up Information    None         Patient's Medications   Discharge Prescriptions    No medications on file     No discharge procedures on file      ED Provider  Electronically Signed by           Beata Waggoner MD  11/05/19 8916

## 2019-11-06 PROBLEM — D61.818 PANCYTOPENIA (HCC): Status: ACTIVE | Noted: 2019-01-01

## 2019-11-06 NOTE — PROGRESS NOTES
Progress Note - Mahi Whitman 1935, 81 y o  male MRN: [id number]    Unit/Bed#: E5 -01 Encounter: [id number]    Primary Care Provider: Mariel Romeo DO   Date and time admitted to hospital: 11/5/2019  1:46 PM        * Ambulatory dysfunction  Assessment & Plan  Fall last night and through earlier today  Per wife patient hit his head  Likely secondary to deconditioning in setting of metastatic cancer and hyponatremia  Complains of generalized weakness and lack of strength  CT head with no acute intracranial abnormality  CT cervical spine with no fracture or traumatic malalignment  Diffuse sclerotic changes throughout the vertebral bodies and posterior elements keeping with metastatic prostate cancer  Pt/ot eval recommend str  Pt requesting home w/home vna   dme ordered for commode, walker, shower chair  Pt will need further ip stay for evaluation of suspected mass of right orbit    Mass of orbit  Assessment & Plan  Incidental finding on CT of head  Concerning given history of metastatic stage IV prostate cancer  Soft tissue in the superior extraocular space of the right orbit suggestive of mass    -order ip mri w/contrast low dose ativan prior to mri  Pending results will d/w pt and confer w/oncology regarding goals of care w/hem/onc    Hyponatremia  Assessment & Plan  Initially w/mild hyperglycemia however no significant improvement w/mild ivf hydration despite correction of BS  Pt looks borderline euvolemic, but does not have good oral intake  Increase ivf to 75cc/hr and repeat bmp in am, if no improvement will need urine sodium chloride urine osm and urinalysis  Recheck in a m  Anxiety and depression  Assessment & Plan  Recently started on Zoloft    Acquired hypothyroidism  Assessment & Plan  Continue levothyroxine    Pancytopenia ([facility] [address] )  Assessment & Plan  Follows with Dr Purvis Estimable of Oncology  Likely 2* prostate cancer w/mets to bone  Leucopenia -w/o neutropenia    No s/sx of acute infection, monitor off abx  Anemia-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  These are stable in mid 7s to 8s  If worsens to less than 7 will need to transfuse 1iu prbc  Thrombocytopenia-mild stable  No s/sx of bleed  Monitor on heparin       Macular degeneration  Assessment & Plan  Poor eyesight at baseline  Continue home eye drops  Follows with Dr Tammi Nicole as an outpt and recieves left sided eye injections  Per family he has developed a chronic left eye droop as a result of these  Prostate cancer metastatic to bone [facility])  Assessment & Plan  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  Initially diagnosised in 2018  Follows with Dr Su Cox of Oncology  Currently on Lupron q 6 months, Xgeva every 3 months,  Xtandi 160 mg daily  VTE Pharmacologic Prophylaxis:   Pharmacologic: Heparin  Mechanical VTE Prophylaxis in Place: Yes    Patient Centered Rounds: I have performed bedside rounds with nursing staff today  Discussions with Specialists or Other Care Team Provider:      Education and Discussions with Family / Patient: dispo workup w/pt and wife and pt's daughters at bedside    Time Spent for Care: 20 minutes  More than 50% of total time spent on counseling and coordination of care as described above  Current Length of Stay: 1 day(s)    Current Patient Status: Inpatient   Certification Statement: The patient will continue to require additional inpatient hospital stay due to ambulatory dysfunction and mass of orbit    Discharge Plan:     Code Status: Level 1 - Full Code      Subjective:   Pt seen/examined  No events overnight  No cp/sob/lightheadedness/head/neck pain  No new blurry vision  No abd pain n/v/d      Objective:     Vitals:   Temp (24hrs), Av 2 °F (36 8 °C), Min:98 1 °F (36 7 °C), Max:98 2 °F (36 8 °C)    Temp:  [98 1 °F (36 7 °C)-98 2 °F (36 8 °C)] 98 1 °F (36 7 °C)  HR:  [] 82  Resp:  [16-18] 18  BP: (113-133)/(58-86) 129/68  SpO2:  [95 %-98 %] 95 %  Body mass index is 31 98 kg/m²  Input and Output Summary (last 24 hours): Intake/Output Summary (Last 24 hours) at 11/6/2019 1447  Last data filed at 11/6/2019 0910  Gross per 24 hour   Intake 480 ml   Output    Net 480 ml       Physical Exam:     Physical Exam   Constitutional: He appears well-developed and well-nourished  No distress  HENT:   Head: Normocephalic and atraumatic  Right Ear: External ear normal    Left Ear: External ear normal    Left eye droop noted   Eyes: Conjunctivae are normal    Neck: Normal range of motion  Cardiovascular: Normal rate, regular rhythm and normal heart sounds  Exam reveals no friction rub  No murmur heard  Pulmonary/Chest: Effort normal and breath sounds normal  No respiratory distress  He has no wheezes  He has no rales  Abdominal: Soft  He exhibits no distension and no mass  There is no tenderness  There is no guarding  Musculoskeletal: He exhibits no edema  Neurological: He is alert  Skin: Skin is warm and dry  He is not diaphoretic  Psychiatric: He has a normal mood and affect  Vitals reviewed      (  Be Sure to Include Physical Exam: Delete this entire line when you have entered your exam)    Additional Data:     Labs:    Results from last 7 days   Lab Units 11/06/19  0529 11/05/19  1425   WBC Thousand/uL 3 69* 3 71*   HEMOGLOBIN g/dL 7 2* 7 9*   HEMATOCRIT % 23 0* 26 1*   PLATELETS Thousands/uL 103* 106*   NEUTROS PCT %  --  71   LYMPHS PCT %  --  17   MONOS PCT %  --  9   EOS PCT %  --  1     Results from last 7 days   Lab Units 11/06/19  0529 11/05/19  1425   SODIUM mmol/L 129* 129*   POTASSIUM mmol/L 3 9 4 0   CHLORIDE mmol/L 96* 95*   CO2 mmol/L 26 29   BUN mg/dL 14 14   CREATININE mg/dL 0 51* 0 50*   ANION GAP mmol/L 7 5   CALCIUM mg/dL 8 3 8 7   ALBUMIN g/dL  --  2 6*   TOTAL BILIRUBIN mg/dL  --  0 34   ALK PHOS U/L  --  296*   ALT U/L  --  13   AST U/L  --  35   GLUCOSE RANDOM mg/dL 121 128     Results from last 7 days   Lab Units 11/05/19  1425   INR  1 11                       * I Have Reviewed All Lab Data Listed Above  * Additional Pertinent Lab Tests Reviewed: All Labs Within Last 24 Hours Reviewed    Imaging:    Imaging Reports Reviewed Today Include: ct head and c spine reviewed, cxr reviewed  Imaging Personally Reviewed by Myself Includes:      Recent Cultures (last 7 days):           Last 24 Hours Medication List:     Current Facility-Administered Medications:  acetaminophen 650 mg Oral Q6H PRN Sadia Hernandez PA-C    dorzolamide 1 drop Both Eyes TID Betty Lara PA-C    enzalutamide 160 mg Oral Daily Dariel Jean-Baptiste MD    heparin (porcine) 5,000 Units Subcutaneous Q8H [facility] Sadia Hernandez PA-C    levothyroxine 75 mcg Oral Daily Sadia Hernandez PA-C    LORazepam 0 5 mg Oral Once PRN Nanci Stuart PA-C    melatonin 6 mg Oral HS Sadia Hernandez PA-C    ondansetron 4 mg Intravenous Q6H PRN Sadia Hernandez PA-C    sertraline 50 mg Oral Daily Sadia Hernandez PA-C    sodium chloride 50 mL/hr Intravenous Continuous Sadia Hernandez PA-C Last Rate: 50 mL/hr (11/05/19 2039)   travoprost 1 drop Both Eyes HS Sadia Hernandez PA-C         Today, Patient Was Seen By: Antonio Hale PA-C    ** Please Note: Dictation voice to text software may have been used in the creation of this document   **

## 2019-11-06 NOTE — ASSESSMENT & PLAN NOTE
Incidental finding on CT of head  Concerning given history of metastatic stage IV prostate cancer  Soft tissue in the superior extraocular space of the right orbit suggestive of mass    -order ip mri w/contrast low dose ativan prior to mri    Pending results will d/w pt and confer w/oncology regarding goals of care w/hem/onc

## 2019-11-06 NOTE — ASSESSMENT & PLAN NOTE
Initially w/mild hyperglycemia however no significant improvement w/mild ivf hydration despite correction of BS  Pt looks borderline euvolemic, but does not have good oral intake  Increase ivf to 75cc/hr and repeat bmp in am, if no improvement will need urine sodium chloride urine osm and urinalysis  Recheck in a m

## 2019-11-06 NOTE — PLAN OF CARE
Problem: PHYSICAL THERAPY ADULT  Goal: Performs mobility at highest level of function for planned discharge setting  See evaluation for individualized goals  Description  Treatment/Interventions: Functional transfer training, LE strengthening/ROM, Elevations, Therapeutic exercise, Endurance training, Patient/family training, Equipment eval/education, Bed mobility, Gait training, Spoke to nursing, Spoke to case management, OT  Equipment Recommended: Walker(RW)       See flowsheet documentation for full assessment, interventions and recommendations  Note:   Prognosis: Fair  Problem List: Decreased strength, Decreased endurance, Impaired balance, Decreased mobility, Decreased coordination, Impaired judgement, Decreased safety awareness, Impaired sensation  Assessment: Pt is a 81 y o  male seen for PT evaluation s/p admit to [facility] on 11/5/19  Two pt identifiers were used to confirm  Pt presented s/p fall at home which occurred on 11/5/19  Pt was admitted with a primary dx of: hyponatremia  PT now consulted for assessment of mobility and d/c needs  Pts current co morbidities effecting treatment include: prostate cancer metastatic to bone, anemia, mass of orbit, CKD, HLD, HTN and personal factors including 2 VI home environment  Pt currently lives in a two story home with his wife  Pts current clinical presentation is Unstable/ Unpredictable (high complexity) due to Ongoing medical management for primary dx, Increased reliance on more restrictive AD compared to baseline, decreased activity tolerance compared to baseline, fall risk, increased assistance needed from caregiver at current time, multiple lines, decline in overall functional mobility status  Prior to admission, pt was mod I with ambulation with the use of a rollator as per pt  Upon evaluation, pt currently is requiring min A for transfers and min A for ambulation w/ RW   Pt displays inconsistent cynthia, ataxia, decreased step and stride length, decreased gait speed  Pt presents at PT eval functioning below baseline and currently w/ overall mobility deficits secondary to: decreased strength, decreased endurance, impaired balance, impaired coordination, decreased insight into deficits  Pt currently at a fall risk secondary to impairments listed above  Based on PT evaluation, pt will continue to benefit from skilled acute PT interventions to address stated impairments; to maximize functional mobility; for ongoing pt/ family training; and DME needs  At conclusion of PT session pt was left seated in bedside chair, all needs within reach  Provided pt education regarding PT plan to improve functional mobility status  PT is currently recommending post acute inpatient rehab  Pt agreeable to plan and goals as stated on evaluation  PT will continue to follow during hospital stay  Recommendation: Post acute IP rehab     PT - OK to Discharge: Yes    See flowsheet documentation for full assessment

## 2019-11-06 NOTE — CASE MANAGEMENT
CM met with patient, wife and 2 daughters to assess and address discharge needs  Primary caregiver-wife  Support System-wife and three daughters  Advance Directive-yes, copy requested   Power of Delmi [place]- unsure if have POA but healthcare representative is wife, Kavita Pleitez     Pharmacy-[facility] [address]  PCP- Bryn Butt, DO  Financial Concerns-none voiced  Mental Health/Drug/ETOH- denies MH or D&A abuse history     Prior Living Arrangements/ADL's/Mobility/Home Set-Up- lives in a condo with his wife; 2-3STE with chair lift to bedroom and bathroom on second floor  Wife assists with ADLs and patient ambulates with a rollator  Prior Services for HHC/DME/Infusion/Private-Denies previous [address] or STR  DME: rollator     Patient for discharge to STR vs home with [address] when medically cleared  CM discussed PT/OT recommendation for STR with patient and family and he declined due to his daughter and grandson coming from [place] on Friday  CM left STR choices for patient if decision changes  CM provided choices for PT/OT for patient and was given the choices of [facility] and [facility]  CM placed referral to [facility], patient was accepted and informed patient and wife  Patient and family requested DME for home: BSC, RW, and shower chair  CM reviewed choices for DME and patient and family agreeable to [facility]  CM requested DME scripts from [facility] and will submit referral to [facility] when received  CM department will continue to follow through discharge  CM reviewed d/c planning process including the following: identifying help at home, patient preference for d/c planning needs, availability of treatment team to discuss questions or concerns patient and/or family may have regarding understanding medications and recognizing signs and symptoms once discharged  CM also encouraged patient to follow up with all recommended appointments after discharge   Patient advised of importance for patient and family to participate in managing patient's medical well being

## 2019-11-06 NOTE — ASSESSMENT & PLAN NOTE
Poor eyesight at baseline  Continue home eye drops  Follows with Dr Isra Rincon as an outpt and recieves left sided eye injections  Per family he has developed a chronic left eye droop as a result of these

## 2019-11-06 NOTE — PHYSICAL THERAPY NOTE
Physical Therapy Evaluation    Patient Name: Ivonne Alfred    LVTUU'V Date: 11/6/2019     Problem List  Principal Problem:    Ambulatory dysfunction  Active Problems:    Prostate cancer metastatic to bone ([facility] [address] )    Macular degeneration    Anemia, unspecified    Acquired hypothyroidism    Mass of orbit    Hyponatremia    Anxiety and depression       Past Medical History  Past Medical History:   Diagnosis Date    Acute bronchitis     Hypertension     Prostate CA ([address] )     Urinary frequency     UTI (urinary tract infection)         Past Surgical History  Past Surgical History:   Procedure Laterality Date    CHOLECYSTECTOMY      COLONOSCOPY      [id number] ([place])           11/06/19 1030   Note Type   Note type Eval only   Pain Assessment   Pain Assessment No/denies pain   Pain Score No Pain   Home Living   Type of Home House   Home Layout Two level;Stairs to enter with rails;1/2 bath on main level;Bed/bath upstairs  (2 VI)   Bathroom Shower/Tub Walk-in shower   Bathroom Toilet Standard   Bathroom Equipment Grab bars in [place] glide; Other (Comment)  (rollator)   Additional Comments pt reports he was using rollator PTA   Prior Function   Level of [place] Independent with ADLs and functional mobility; Needs assistance with IADLs   Lives With Spouse   Receives Help From Family   ADL Assistance Independent   IADLs Needs assistance   Falls in the last 6 months 1 to 4  (1 leading to this admission)   Vocational Retired   Restrictions/Precautions   [place] Bearing Precautions Per Order No   Other Precautions Chair Alarm;Multiple lines; Fall Risk   General   Family/Caregiver Present No   Cognition   Overall Cognitive Status WFL   Arousal/Participation Cooperative   Orientation Level Oriented X4   Following Commands Follows one step commands without difficulty   RUE Assessment   RUE Assessment WFL   LUE Assessment   LUE Assessment WFL   RLE Assessment   RLE Assessment X   Strength RLE   RLE Overall Strength 3+/5  (grossly)   LLE Assessment   LLE Assessment X   Strength LLE   LLE Overall Strength 3+/5  (grossly)   Coordination   Movements are Fluid and Coordinated 0   Coordination and Movement Description slow movements, ataxia   Bed Mobility   Additional Comments Not assessed, pt received sitting in bedside chair, agreeable to therapy session   Transfers   Sit to Stand 4  Minimal assistance   Additional items Increased time required;Verbal cues   Stand to Sit 4  Minimal assistance   Additional items Increased time required;Verbal cues   Additional Comments RW used for transfers, verbal cues for hand placement   Ambulation/Elevation   Gait pattern Improper Weight shift; Forward Flexion;Decreased foot clearance; Inconsistent cynthia; Short stride; Ataxia; Excessively slow   Gait Assistance 4  Minimal assist  (2 instances of mod A x 1)   Additional items Verbal cues; Tactile cues   Assistive Device Rolling walker   Distance 40'   Balance   Static Sitting Good   Dynamic Sitting Fair +   Static Standing Fair -  (RW)   Dynamic Standing Poor +  (RW)   Ambulatory Poor +  (RW)   Endurance Deficit   Endurance Deficit Yes   Endurance Deficit Description weakness, fatigue   Activity Tolerance   Activity Tolerance Patient tolerated treatment well   Medical Staff Made Aware spoke with RIRI DONOHUE   Nurse Made Aware RN cleared patient for PT evaluation   Assessment   Prognosis Fair   Problem List Decreased strength;Decreased endurance; Impaired balance;Decreased mobility; Decreased coordination; Impaired judgement;Decreased safety awareness; Impaired sensation   Assessment Pt is a 81 y o  male seen for PT evaluation s/p admit to [facility] on 11/5/19  Two pt identifiers were used to confirm  Pt presented s/p fall at home which occurred on 11/5/19  Pt was admitted with a primary dx of: hyponatremia  PT now consulted for assessment of mobility and d/c needs   Pts current co morbidities effecting treatment include: prostate cancer metastatic to bone, anemia, mass of orbit, CKD, HLD, HTN and personal factors including 2 VI home environment  Pt currently lives in a two story home with his wife  Pts current clinical presentation is Unstable/ Unpredictable (high complexity) due to Ongoing medical management for primary dx, Increased reliance on more restrictive AD compared to baseline, decreased activity tolerance compared to baseline, fall risk, increased assistance needed from caregiver at current time, multiple lines, decline in overall functional mobility status  Prior to admission, pt was mod I with ambulation with the use of a rollator as per pt  Upon evaluation, pt currently is requiring min A for transfers and min A for ambulation w/ RW  Pt displays inconsistent cynthia, ataxia, decreased step and stride length, decreased gait speed  Pt presents at PT eval functioning below baseline and currently w/ overall mobility deficits secondary to: decreased strength, decreased endurance, impaired balance, impaired coordination, decreased insight into deficits  Pt currently at a fall risk secondary to impairments listed above  Based on PT evaluation, pt will continue to benefit from skilled acute PT interventions to address stated impairments; to maximize functional mobility; for ongoing pt/ family training; and DME needs  At conclusion of PT session pt was left seated in bedside chair, all needs within reach  Provided pt education regarding PT plan to improve functional mobility status  PT is currently recommending post acute inpatient rehab  Pt agreeable to plan and goals as stated on evaluation  PT will continue to follow during hospital stay  Goals   Patient Goals go home   STG Expiration Date 11/20/19   Short Term Goal #1 1  Pt will increased strength by 1 grade in order to increase functional independence with transfers   2  Pt will increase balance grade by 1 in order to improve safety  3  Pt will improve transfer ability to mod I to increase functional independence and decrease caregiver burden  4  Pt will perform bed mobility independently to decrease caregiver burden  5  Pt will be able to amb 150 ft with RW and mod I to increase functional independence in home and community environments  6  Pt will be able to ascend and descend 2 stairs mod I to increase functional independence within the home environment  PT Treatment Day 0   Plan   Treatment/Interventions Functional transfer training;LE strengthening/ROM; Elevations; Therapeutic exercise; Endurance training;Patient/family training;Equipment eval/education; Bed mobility;Gait training;Spoke to nursing;Spoke to case management;OT   PT Frequency Other (Comment)  (3-5x/wk)   Recommendation   Recommendation Post acute IP rehab   Equipment Recommended Walker  (RW)   PT - OK to Discharge Yes   Additional Comments to rehab when medically stable   Modified Giles Scale   Modified [place] Scale 4   Barthel Index   Feeding 10   Bathing 0   Grooming Score 5   Dressing Score 5   Bladder Score 10   Bowels Score 10   Toilet Use Score 5   Transfers (Bed/Chair) Score 10   Mobility (Level Surface) Score 0   Stairs Score 0   Barthel Index Score 55       Alvie Point PT, DPT

## 2019-11-06 NOTE — ASSESSMENT & PLAN NOTE
Follows with Dr Nadya Jacobs of Oncology  Likely 2* prostate cancer w/mets to bone  Leucopenia -w/o neutropenia  No s/sx of acute infection, monitor off abx  Anemia-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  These are stable in mid 7s to 8s  If worsens to less than 7 will need to transfuse 1iu prbc  Thrombocytopenia-mild stable  No s/sx of bleed    Monitor on heparin

## 2019-11-06 NOTE — OCCUPATIONAL THERAPY NOTE
[address] Evaluation     Patient Name: Aileen White  AJDAX'H Date: 11/6/2019  Problem List  Principal Problem:    Ambulatory dysfunction  Active Problems:    Prostate cancer metastatic to bone ([facility] [address] )    Macular degeneration    Anemia, unspecified    Acquired hypothyroidism    Mass of orbit    Hyponatremia    Anxiety and depression    Past Medical History  Past Medical History:   Diagnosis Date    Acute bronchitis     Hypertension     Prostate CA ([facility] [address] )     Urinary frequency     UTI (urinary tract infection)      Past Surgical History  Past Surgical History:   Procedure Laterality Date    CHOLECYSTECTOMY      COLONOSCOPY      [id number] ([place])             11/06/19 1102   Note Type   Note type Eval/Treat   Restrictions/Precautions   Weight Bearing Precautions Per Order No   Other Precautions Chair Alarm; Bed Alarm;Multiple lines; Fall Risk   Pain Assessment   Pain Assessment No/denies pain   Pain Score No Pain   Home Living   Type of Home House   Home Layout Two level;Stairs to enter with rails;1/2 bath on main level;Bed/bath upstairs  (2 VI)   Bathroom Shower/Tub Walk-in shower   Bathroom Toilet Standard   Bathroom Equipment Grab bars in shower   [address] [place];Cane  (rollator)   Additional Comments pt using rollator PTA reports did not back rollator up to recliner w/ him when he had the fall at home   Prior Function   Level of [place] Independent with ADLs and functional mobility   Lives With [place] Help From Family   ADL Assistance Independent   IADLs Needs assistance   Falls in the last 6 months 1 to 4  (2)   Vocational Retired   Comments pt reports spouse drives, supportive family nearby who assists   Lifestyle   Autonomy per pt independent w/ ADLs, independent w/ functional transfers and mobility w/ rollator, assist w/ IADLs   Reciprocal Relationships spouse   Service to Others retired   Intrinsic Gratification watching tv, using herminia which is more difficult now w/ vision   Subjective   Subjective "I want to go home because my daughter and grandson are coming to visit on Friday"   ADL   Where Assessed Chair   Eating Assistance 5  Supervision/Setup   Grooming Assistance 5  Supervision/Setup   [facility] [address] 5  Supervision/Setup   LB [address] 4  Minimal Assistance   [address] 5  Supervision/Setup    [facility] 4  [facility] [address]  4  Minimal Assistance   Bed Mobility   Supine to Sit Unable to assess   Additional Comments pt seated in recliner pre/post session   Transfers   Sit to Stand 4  Minimal assistance   Additional items Assist x 1; Increased time required;Verbal cues;Armrests   Stand to Sit 4  Minimal assistance   Additional items Assist x 1; Increased time required;Verbal cues;Armrests   Additional Comments VCs for hand placement and positioning   Functional Mobility   Functional Mobility 4  Minimal assistance   Additional Comments assist x1 w/ increased time to complete   Additional items Rolling walker   Balance   Static Sitting Good   Dynamic Sitting Fair +   Static Standing Fair   Dynamic Standing Fair -   Ambulatory Poor +   Activity Tolerance   Activity Tolerance Patient limited by fatigue;Patient tolerated treatment well   Medical Staff Made Aware JAYDE Richardson and Papa CORONADO   Nurse Made Aware appropriate to see per RN   RUE Assessment   RUE Assessment   (limited elevation 2* RTC injury, distal 3+/5)   LUE Assessment   LUE Assessment WFL  (3+/5)   Hand Function   Gross Motor Coordination Functional   Fine Motor Coordination Functional   Sensation   Light Touch No apparent deficits   Vision-Basic Assessment   Current Vision Wears glasses all the time   Visual History Glaucoma; Macular degeneration   Patient Visual Report   (reports blurred vision w/ print)   Vision - Complex Assessment   Ocular Range of Motion [facility] Able to read clock/calendar on wall without difficulty   Cognition   Overall Cognitive Status WFL   Arousal/Participation Alert; Cooperative   Attention Within functional limits   Orientation Level Oriented X4   Memory Decreased recall of precautions   Following Commands Follows one step commands without difficulty   Comments pt engages in appropriate conversations, requires increased assist w/ safety for RW and home safety education   Assessment   Limitation Decreased ADL status; Decreased UE strength;Decreased Safe judgement during ADL;Decreased endurance;Decreased cognition;Decreased self-care trans;Decreased high-level ADLs   Prognosis Good   Assessment Pt is a 81 y o  male seen for OT evaluation s/p admit to [facility] on 11/5/2019 w/ fall and generalized weakness; Ambulatory dysfunction  CT spine: No cervical spine fracture or traumatic malalignment  Diffuse sclerotic changes throughout the vertebral bodies and posterior elements in keeping with metastatic prostate cancer  CT head (-) acute  Comorbidities affecting pt's functional performance at time of assessment include: anemia, prostate CA w/ mets to the bone, anxiety and depression, hyponatremia, macular degeneration,  Personal factors affecting pt at time of IE include: fall risk  Prior to admission, pt was living w/ spouse and reports independent w/ ADLs, independent w/ functional transfers and mobility w/ rollator, assist w/ IADLs from spouse  Upon evaluation: Pt requires MIN assist sit<>stand w/ VCs for safety, MIN assist functional mobility w/ RW and assist for line management, MIN assist LB ADLs, setup UB ADLs, MIN assist toileting 2* the following deficits impacting occupational performance: decreased strength and endurance, impaired balance, fair - activity tolerance, impaired AROM R UE from RTC injury, impaired functional reach, generalized weakness, decreased safety awareness, fall risk   Pt to benefit from continued skilled OT tx while in the hospital to address deficits as defined above and maximize level of functional independence w ADL's and functional mobility  Occupational Performance areas to address include: grooming, bathing/shower, toilet hygiene, dressing, health maintenance, functional mobility and clothing management, fall prevention education and home safety education  Pt family educated on benefit of BSC over toilet and educated how it can also be utilized as a shower chair, family verbalized understanding and requesting a rolling walker as well  From OT standpoint, recommendation at time of d/c would be short term rehab; however pt declining rehab and wishes to return home w/ HOME OT/PT and family support  Goals   Patient Goals "go home"   LTG Time Frame 10-14   Long Term Goal please see below goals   Plan   Treatment Interventions ADL retraining; Endurance training;Functional transfer training;UE strengthening/ROM; Cognitive reorientation;Patient/family training;Equipment evaluation/education; Compensatory technique education; Energy conservation; Activityengagement   Goal Expiration Date 11/20/19   OT Frequency 3-5x/wk   Recommendation   OT Discharge Recommendation Short Term Rehab  (pt declining rehab; if HOME HOME OT and family support)   Equipment Recommended Bedside commode  (rolling walker)   Barthel Index   Feeding 10   Bathing 0   Grooming Score 5   Dressing Score 5   Bladder Score 10   Bowels Score 10   Toilet Use Score 5   Transfers (Bed/Chair) Score 10   Mobility (Level Surface) Score 0   Stairs Score 0   Barthel Index Score 55   Modified [place] Scale   Modified [place] Scale 4     Occupational Therapy Goals to be met in 10-14 days:  1) Pt will improve activity tolerance to G for min 30 min txment sessions to enhance ADLs  2) Pt will complete ADLs/self care w/ mod I   3) Pt will complete toileting w/ mod I w/ G hygiene/thoroughness using DME PRN  4) Pt will improve functional transfers on/off all surfaces using DME PRN w/ G balance/safety including toileting w/ mod I  5) Pt will improve fx'l mobility during I/ADl/leisure tasks using DME PRN w/ g balance/safety w/ mod I  6) Pt will engage in ongoing cognitive assessment w/ G participation to A w/ safe d/c planning/recommendations  7) Pt will demonstrate G carryover of pt/caregiver education and training as appropriate w/ mod I  w/ G tolerance  8) Pt will engage in depression screen/leisure interest checklist w/ G participation to monitor s/s depression and ID 3 positive coping strategies to A w/ emotional regulation and management  9) Pt will demonstrate 100% carryover of E C  techniques w/ mod I t/o fx'l I/ADL/leisure tasks w/o cues s/p skilled education  10) Pt will demonstrate improved bed mobility to MOD I to enhance ADLs  11) Pt will demonstrate 100% carryover of LHAE for LB ADLs/self care and leisure s/p skilled education w/ mod I and G participation  12) Pt will demonstrate improved standing tolerance to 3-5 minutes during functional tasks w/ Fair + dynamic standing balance to enhance ADL performance  13) Pt will demonstrate improved b/l UE strength by 1 MMT grade to enhance ADLS and functional transfers   14) Pt will recall 3 fall prevention education techniques to enhance safety in the home and decrease risk for falls      Documentation completed by: Jacques Gregorio MS, OTR/L

## 2019-11-06 NOTE — TELEPHONE ENCOUNTER
Called patient's wife, she stated that her daughter had spoken to a nurse at the hospital about getting a consult from us for her dad  I instructed patient's wife that is the proper protocol for this  I instructed her to give us a call if anything changes

## 2019-11-06 NOTE — MALNUTRITION/BMI
This medical record reflects one or more clinical indicators suggestive of malnutrition and/or morbid obesity  Malnutrition Findings:   Malnutrition type: Chronic illness  Degree of Malnutrition: Malnutrition of moderate degree(related to metastatic cancer as evidenced by mild fat depletion around orbitals- slightly dark circles, mild muscle loss around temples- slight depression)  Malnutrition Characteristics: Fat loss, Muscle loss(Added Ensure nutrition shakes to assist with adding protein/calories daily)        See Nutrition note dated 11/6/19 for additional details  Completed nutrition assessment is viewable in flowsheets

## 2019-11-06 NOTE — TELEPHONE ENCOUNTER
Patient's wife Maday Krishnamurthy called stating that the patient is currently admitted tl [facility] and would like Dr Demetria Bliss or the rounding oncology doctor to follow up with him  She stated that she believes that their where new findings in recent testing  Best call back [phone]

## 2019-11-06 NOTE — ASSESSMENT & PLAN NOTE
Fall last night and through earlier today  Per wife patient hit his head  Likely secondary to deconditioning in setting of metastatic cancer and hyponatremia  Complains of generalized weakness and lack of strength  CT head with no acute intracranial abnormality  CT cervical spine with no fracture or traumatic malalignment  Diffuse sclerotic changes throughout the vertebral bodies and posterior elements keeping with metastatic prostate cancer  Pt/ot eval recommend str  Pt requesting home w/home vna   dme ordered for commode, walker, shower chair    Pt will need further ip stay for evaluation of suspected mass of right orbit

## 2019-11-06 NOTE — ASSESSMENT & PLAN NOTE
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

## 2019-11-07 NOTE — CASE MANAGEMENT
CM received DME prescriptions from [facility] and submitted with referral via [employer]  CM informed of cost: Copay RW $1 85 COMMODE $1 90 SHOWER CHAIR $45 00  CM discussed with patient's wife and daughter and agreeable to payment on delivery with credit card  CM informed [employer] medical rep of same and DME will be delivered to patient's room prior to discharge tonight  CM requested to refer patient for SN as well as PT/OT for CBC and BMP blood draw in one week by LIAN CARRINGTON   CM submitted request via [employer] to add SN with [facility] on 11/8/19 and received confirmation of acceptance for additional services with [facility] tomorrow  CM placed [facility] and DME contact information in Follow-up Provider section and requested RN to fax DCI at time of discharge to [address] [place] [address] Drive  CM department will continue to follow through discharge

## 2019-11-07 NOTE — NURSING NOTE
Pt had been premedicated with ativan prior to MRI  MRI tech called, reports unable to continue w/ orbital portion of study due to pt's inability to cont with exam, pt reports upon return, "too much pain from being on my back for so long"  Pt refused any intervention at this time

## 2019-11-07 NOTE — ASSESSMENT & PLAN NOTE
Poor eyesight at baseline  Continue home eye drops  Follows with Dr Malorie Garcia as an outpt and recieves left sided eye injections  Per family he has developed a chronic left eye droop as a result of these

## 2019-11-07 NOTE — ASSESSMENT & PLAN NOTE
Follows with Dr Natacha Montalvo of Oncology  Likely 2* prostate cancer w/mets to bone  Leucopenia -w/o neutropenia  No s/sx of acute infection, monitor off abx  Anemia-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  Pt down to 6 8 today w/o s/sx of acute bleeding  D/w attending who consented pt for 2 iu prbc which pt tolerated w/o difficulty  Thrombocytopenia-mild stable    No s/sx of bleed

## 2019-11-07 NOTE — DISCHARGE SUMMARY
Discharge- Steve Alfaro 1935, 81 y o  male MRN: [id number]    Unit/Bed#: E5 -01 Encounter: [id number]    Primary Care Provider: Chris Mendes DO   Date and time admitted to hospital: 11/5/2019  1:46 PM      Discharging Physician / Practitioner: Nikki Valdez PA-C  PCP: Chris Mendes DO  Admission Date:   Admission Orders (From admission, onward)     Ordered        11/05/19 1615  Inpatient Admission  Once                   Discharge Date: 11/07/19    Resolved Problems  Date Reviewed: 11/6/2019    None          Consultations During Hospital Stay:  ·     Procedures Performed:   · Transfusion 2 iu prbc    Significant Findings / Test Results:   · MRI no contrast orbits  Limited due to inability to te    Lesion in the superior aspect of the right orbit, mild well-visualized due to technique  Recommend dedicated MRI of the orbits with gadolinium for further evaluation  2   No acute intracranial process  CXR chest pa/lateral   No acute cardiopulmonary disease    CT c spine   No cervical spine fracture or traumatic malalignment      Diffuse sclerotic changes throughout the vertebral bodies and posterior elements in keeping with metastatic prostate cancer      Partially imaged density in the right apex suggestive of pleural effusion  Chest x-ray pending  '    CT head w/o contrast    No acute intracranial abnormality      Left maxillary sinus nearly completely impacted with mucus  Mucosal thickening involving the right maxillary, sphenoid, frontal and ethmoid sinuses      Soft tissue in the superior extraocular space of the right orbit suggestive of a mass    MRI of the orbits with contrast recommended for further evaluation      The study was marked in EPIC for significant notification and follow-up              Incidental Findings:   ·      Test Results Pending at Discharge (will require follow up):   ·      Outpatient Tests Requested:  · Bmp/cbc in one week    Complications:      Reason for Admission: falls at home    Hospital Course: Kerri Edwards is a 81 y o  male patient who originally presented to the hospital on 11/5/2019 with a PMH of transfusion dependent anemia, macular degeneration, glaucoma, hypothyroidism, prostate cancer with mets to bone currently undergoing treatment by Dr Liane Gonzalez      He presents with a complaint of increasing generalized weakness at home  Yesterday he was getting up from the commode and lost his balance  He fell to the floor  He was unable to get up on his own and needed family to assist him to a chair  Today he was going to sit down in the chair when he lost his balance and fell backwards  He hit the back of his head  Family brought him to the ED for further workup and evaluation given concern for frequent falls at home and concern for safety  He is currently undergoing acute oral chemotherapy by Dr Liane Gonzalez as well as Lupron shots  He is transfusion dependent and last received a transfusion 9/20/19  Overall decreased appetite and minimal water intake the past few days  Denies any chest pain, chest pressure, palpitations, nausea or vomiting  No fever, chills, or diarrhea      Lives at home with wife  Hospital stay by problem list below      * Ambulatory dysfunction  Assessment & Plan  Fall last night and through earlier today  Per wife patient hit his head  Likely secondary to deconditioning in setting of metastatic cancer and hyponatremia  Complains of generalized weakness and lack of strength  CT head with no acute intracranial abnormality  CT cervical spine with no fracture or traumatic malalignment  Diffuse sclerotic changes throughout the vertebral bodies and posterior elements keeping with metastatic prostate cancer  Pt/ot eval recommend str  Pt requesting home w/home vna   dme ordered for commode, walker, shower chair  Okay for d/c after transfusion today    Mass of orbit  Assessment & Plan  Incidental finding on CT of head    Concerning given history of metastatic stage IV prostate cancer  Soft tissue in the superior extraocular space of the right orbit suggestive of mass    -no EOM palsy, no new vision loss or eye pain/headache   -order ip mri w/contrast low dose ativan prior to mri  MRI interrupted due to difficultly lying supine resulting in no gadolinium imaging which demonstrate 1 1 by 1 6cm lesion, indeterminant in characteristic, but hyperintense lesion in the left superior aspect of the right orbit   -d/w pt and pt's wife/daughter at length  Given poor inability to tolerate MRI and f/u with hem/onc in 6 days will defer repeat imaging as pt/family unclear if they would want to pursue additional measures or therapies if true malignancy  D/w pt/pt's family warning signs to monitor for in light of this  -f/u with hem/onc    Hyponatremia  Assessment & Plan  Initially w/mild hyperglycemia however no significant improvement w/mild ivf hydration despite correction of BS  Pt looks borderline euvolemic, but does not have good oral intake  Had improvement from 129 to 131 w/continued ivf and ultimately transition to 1g NaCl tabs 3x/day by attending  -at this point unclear if true SIADH from metastatic prostate ca/SSRI at this time given multiple treatment modalities but favor SIADH  -home nursing to obtain cbc/bmp in one week post discharge and continued monitoring    Anxiety and depression  Assessment & Plan  Recently started on Zoloft    Acquired hypothyroidism  Assessment & Plan  Continue levothyroxine    Pancytopenia ([facility] [address] )  Assessment & Plan  Follows with Dr Uma Leonardo of Oncology  Likely 2* prostate cancer w/mets to bone  Leucopenia -w/o neutropenia  No s/sx of acute infection, monitor off abx  Anemia-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  Pt down to 6 8 today w/o s/sx of acute bleeding  D/w attending who consented pt for 2 iu prbc which pt tolerated w/o difficulty  Thrombocytopenia-mild stable    No s/sx of bleed      Macular degeneration  Assessment & Plan  Poor eyesight at baseline  Continue home eye drops  Follows with Dr Dania Barrera as an outpt and recieves left sided eye injections  Per family he has developed a chronic left eye droop as a result of these  Prostate cancer metastatic to bone [facility])  Assessment & Plan  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  Initially diagnosised in 2018  Follows with Dr Hyun Contreras of Oncology  Currently on Lupron q 6 months, Xgeva every 3 months,  Xtandi 160 mg daily  Continue to f/u with proothi in one week for further mgmt in light of orbital mass          Please see above list of diagnoses and related plan for additional information  Condition at Discharge: fair     Discharge Day Visit / Exam:     Subjective:  Pt seen/examined  Pt had difficulty tolerating mri while lying flat due to back pain and was unable to get daryl for mri  Reports his back pain is stable chronic b/l LBP w/o radicular features  He has no headache/ new blurry vision, eye pain or difficulty w/eye movement  No cp/sob/abd pain/n/v/d  Vitals: Blood Pressure: 134/69 (11/07/19 1503)  Pulse: 85 (11/07/19 1503)  Temperature: 98 4 °F (36 9 °C) (11/07/19 1503)  Temp Source: Temporal (11/07/19 1503)  Respirations: 18 (11/07/19 1503)  Weight - Scale: 95 4 kg (210 lb 5 1 oz) (11/05/19 1351)  SpO2: 96 % (11/07/19 1503)  Exam:   Physical Exam   Constitutional: He appears well-developed and well-nourished  No distress  HENT:   Head: Normocephalic and atraumatic  Right Ear: External ear normal    Left Ear: External ear normal    Eyes: Conjunctivae are normal    Left eye droop   Neck: Normal range of motion  Cardiovascular: Normal rate, regular rhythm and normal heart sounds  Exam reveals no friction rub  No murmur heard  Pulmonary/Chest: Effort normal  No respiratory distress  He has no wheezes  He has no rales  Abdominal: Soft   He exhibits no distension and no mass  There is no tenderness  There is no guarding  Musculoskeletal: He exhibits no edema or tenderness (no reproducible pain w/palpation of midline C/T/L/S spine or paraspinal soft tissue)  Neurological: He is alert  Skin: Skin is warm and dry  He is not diaphoretic  Psychiatric: He has a normal mood and affect  Vitals reviewed  (  Be Sure to Include Physical Exam: Delete this entire line when you have entered your exam)  Discussion with Family: dispo w/wife/daughter at bedside    Discharge instructions/Information to patient and family:   See after visit summary for information provided to patient and family  Provisions for Follow-Up Care:  See after visit summary for information related to follow-up care and any pertinent home health orders  Disposition:     Home with VNA Services (Reminder: Complete face to face encounter)    For Discharges to [facility] SNF:   · Not Applicable to this Patient - Not Applicable to this Patient    Planned Readmission: none     Discharge Statement:  I spent 45 minutes discharging the patient  This time was spent on the day of discharge  I had direct contact with the patient on the day of discharge  Greater than 50% of the total time was spent examining patient, answering all patient questions, arranging and discussing plan of care with patient as well as directly providing post-discharge instructions  Additional time then spent on discharge activities  Discharge Medications:  See after visit summary for reconciled discharge medications provided to patient and family        ** Please Note: This note has been constructed using a voice recognition system **

## 2019-11-07 NOTE — ASSESSMENT & PLAN NOTE
Initially w/mild hyperglycemia however no significant improvement w/mild ivf hydration despite correction of BS  Pt looks borderline euvolemic, but does not have good oral intake    Had improvement from 129 to 131 w/continued ivf and ultimately transition to 1g NaCl tabs 3x/day by attending  -at this point unclear if true SIADH from metastatic prostate ca/SSRI at this time given multiple treatment modalities but favor SIADH  -home nursing to obtain cbc/bmp in one week post discharge and continued monitoring

## 2019-11-07 NOTE — ASSESSMENT & PLAN NOTE
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

## 2019-11-07 NOTE — ASSESSMENT & PLAN NOTE
Fall last night and through earlier today  Per wife patient hit his head  Likely secondary to deconditioning in setting of metastatic cancer and hyponatremia  Complains of generalized weakness and lack of strength  CT head with no acute intracranial abnormality  CT cervical spine with no fracture or traumatic malalignment  Diffuse sclerotic changes throughout the vertebral bodies and posterior elements keeping with metastatic prostate cancer  Pt/ot eval recommend str  Pt requesting home w/home vna   dme ordered for commode, walker, shower chair    Okay for d/c after transfusion today

## 2019-11-07 NOTE — PLAN OF CARE
Problem: Potential for Falls  Goal: Patient will remain free of falls  Description  INTERVENTIONS:  - Assess patient frequently for physical needs  -  Identify cognitive and physical deficits and behaviors that affect risk of falls    -  [place] fall precautions as indicated by assessment   - Educate patient/family on patient safety including physical limitations  - Instruct patient to call for assistance with activity based on assessment  - Modify environment to reduce risk of injury  - Consider OT/PT consult to assist with strengthening/mobility  Outcome: Progressing     Problem: PAIN - ADULT  Goal: Verbalizes/displays adequate comfort level or baseline comfort level  Description  Interventions:  - Encourage patient to monitor pain and request assistance  - Assess pain using appropriate pain scale  - Administer analgesics based on type and severity of pain and evaluate response  - Implement non-pharmacological measures as appropriate and evaluate response  - Consider cultural and social influences on pain and pain management  - Notify physician/advanced practitioner if interventions unsuccessful or patient reports new pain  Outcome: Progressing     Problem: INFECTION - ADULT  Goal: Absence or prevention of progression during hospitalization  Description  INTERVENTIONS:  - Assess and monitor for signs and symptoms of infection  - Monitor lab/diagnostic results  - Monitor all insertion sites, i e  indwelling lines, tubes, and drains  - Monitor endotracheal if appropriate and nasal secretions for changes in amount and color  - [place] appropriate cooling/warming therapies per order  - Administer medications as ordered  - Instruct and encourage patient and family to use good hand hygiene technique  - Identify and instruct in appropriate isolation precautions for identified infection/condition  Outcome: Progressing     Problem: SAFETY ADULT  Goal: Patient will remain free of falls  Description  INTERVENTIONS:  - Assess patient frequently for physical needs  -  Identify cognitive and physical deficits and behaviors that affect risk of falls    -  [place] fall precautions as indicated by assessment   - Educate patient/family on patient safety including physical limitations  - Instruct patient to call for assistance with activity based on assessment  - Modify environment to reduce risk of injury  - Consider OT/PT consult to assist with strengthening/mobility  Outcome: Progressing  Goal: Maintain or return to baseline ADL function  Description  INTERVENTIONS:  -  Assess patient's ability to carry out ADLs; assess patient's baseline for ADL function and identify physical deficits which impact ability to perform ADLs (bathing, care of mouth/teeth, toileting, grooming, dressing, etc )  - Assess/evaluate cause of self-care deficits   - Assess range of motion  - Assess patient's mobility; develop plan if impaired  - Assess patient's need for assistive devices and provide as appropriate  - Encourage maximum independence but intervene and supervise when necessary  - Involve family in performance of ADLs  - Assess for home care needs following discharge   - Consider OT consult to assist with ADL evaluation and planning for discharge  - Provide patient education as appropriate  Outcome: Progressing     Problem: DISCHARGE PLANNING  Goal: Discharge to home or other facility with appropriate resources  Description  INTERVENTIONS:  - Identify barriers to discharge w/patient and caregiver  - Arrange for needed discharge resources and transportation as appropriate  - Identify discharge learning needs (meds, wound care, etc )  - Arrange for interpretive services to assist at discharge as needed  - Refer to Case Management Department for coordinating discharge planning if the patient needs post-hospital services based on physician/advanced practitioner order or complex needs related to functional status, cognitive ability, or social support system  Outcome: Progressing     Problem: Knowledge Deficit  Goal: Patient/family/caregiver demonstrates understanding of disease process, treatment plan, medications, and discharge instructions  Description  Complete learning assessment and assess knowledge base    Interventions:  - Provide teaching at level of understanding  - Provide teaching via preferred learning methods  Outcome: Progressing     Problem: RESPIRATORY - ADULT  Goal: Achieves optimal ventilation and oxygenation  Description  INTERVENTIONS:  - Assess for changes in respiratory status  - Assess for changes in mentation and behavior  - Position to facilitate oxygenation and minimize respiratory effort  - Oxygen administered by appropriate delivery if ordered  - Initiate smoking cessation education as indicated  - Encourage broncho-pulmonary hygiene including cough, deep breathe, Incentive Spirometry  - Assess the need for suctioning and aspirate as needed  - Assess and instruct to report SOB or any respiratory difficulty  - Respiratory Therapy support as indicated  Outcome: Progressing     Problem: GASTROINTESTINAL - ADULT  Goal: Maintains adequate nutritional intake  Description  INTERVENTIONS:  - Monitor percentage of each meal consumed  - Identify factors contributing to decreased intake, treat as appropriate  - Assist with meals as needed  - Monitor I&O, weight, and lab values if indicated  - Obtain nutrition services referral as needed  Outcome: Progressing     Problem: HEMATOLOGIC - ADULT  Goal: Maintains hematologic stability  Description  INTERVENTIONS  - Assess for signs and symptoms of bleeding or hemorrhage  - Monitor labs  - Administer supportive blood products/factors as ordered and appropriate  Outcome: Progressing     Problem: MUSCULOSKELETAL - ADULT  Goal: Maintain or return mobility to safest level of function  Description  INTERVENTIONS:  - Assess patient's ability to carry out ADLs; assess patient's baseline for ADL function and identify physical deficits which impact ability to perform ADLs (bathing, care of mouth/teeth, toileting, grooming, dressing, etc )  - Assess/evaluate cause of self-care deficits   - Assess range of motion  - Assess patient's mobility  - Assess patient's need for assistive devices and provide as appropriate  - Encourage maximum independence but intervene and supervise when necessary  - Involve family in performance of ADLs  - Assess for home care needs following discharge   - Consider OT consult to assist with ADL evaluation and planning for discharge  - Provide patient education as appropriate  Outcome: Progressing     Problem: Nutrition/Hydration-ADULT  Goal: Nutrient/Hydration intake appropriate for improving, restoring or maintaining nutritional needs  Description  Monitor and assess patient's nutrition/hydration status for malnutrition  Collaborate with interdisciplinary team and initiate plan and interventions as ordered  Monitor patient's weight and dietary intake as ordered or per policy  Utilize nutrition screening tool and intervene as necessary  Determine patient's food preferences and provide high-protein, high-caloric foods as appropriate       INTERVENTIONS:  - Monitor oral intake, urinary output, labs, and treatment plans  - Assess nutrition and hydration status and recommend course of action  - Evaluate amount of meals eaten  - Assist patient with eating if necessary   - Allow adequate time for meals  - Recommend/ encourage appropriate diets, oral nutritional supplements, and vitamin/mineral supplements  - Order, calculate, and assess calorie counts as needed  - Recommend, monitor, and adjust tube feedings and TPN/PPN based on assessed needs  - Assess need for intravenous fluids  - Provide specific nutrition/hydration education as appropriate  - Include patient/family/caregiver in decisions related to nutrition  Outcome: Progressing     Problem: Prexisting or High Potential for Compromised Skin Integrity  Goal: Skin integrity is maintained or improved  Description  INTERVENTIONS:  - Identify patients at risk for skin breakdown  - Assess and monitor skin integrity  - Assess and monitor nutrition and hydration status  - Monitor labs   - Assess for incontinence   - Turn and reposition patient  - Assist with mobility/ambulation  - Relieve pressure over bony prominences  - Avoid friction and shearing  - Provide appropriate hygiene as needed including keeping skin clean and dry  - Evaluate need for skin moisturizer/barrier cream  - Collaborate with interdisciplinary team   - Patient/family teaching  - Consider wound care consult   Outcome: Progressing

## 2019-11-07 NOTE — ASSESSMENT & PLAN NOTE
Incidental finding on CT of head  Concerning given history of metastatic stage IV prostate cancer  Soft tissue in the superior extraocular space of the right orbit suggestive of mass    -no EOM palsy, no new vision loss or eye pain/headache   -order ip mri w/contrast low dose ativan prior to mri  MRI interrupted due to difficultly lying supine resulting in no gadolinium imaging which demonstrate 1 1 by 1 6cm lesion, indeterminant in characteristic, but hyperintense lesion in the left superior aspect of the right orbit   -d/w pt and pt's wife/daughter at length  Given poor inability to tolerate MRI and f/u with hem/onc in 6 days will defer repeat imaging as pt/family unclear if they would want to pursue additional measures or therapies if true malignancy    D/w pt/pt's family warning signs to monitor for in light of this  -f/u with hem/onc

## 2019-11-07 NOTE — OCCUPATIONAL THERAPY NOTE
Occupational Therapy Cancellation Note        Patient Name: Daphne Moe  [id number] Date: 11/7/2019      Pt currently receiving a blood transfusion and not appropriate for therapy at this time  Will continue to follow to address OT POC      Ronak Noble MS, OTR/L

## 2019-11-08 NOTE — TELEPHONE ENCOUNTER
Patient's daughter Prasanna Aldridge called requesting to speak to nurse regarding father's care  Informed she is not on communication consent  States she will have patient call to give verbal consent

## 2019-11-08 NOTE — TELEPHONE ENCOUNTER
Spoke to Dr Alexander Ledesma states he would need an MRI asap  He states that he would recommend patient be re-admitted to the hospital regarding all this patient's symptoms  I called and spoke to Trudy Lee with this information, she states she will talk this over with her dad and give me a call back  I told her to ask for me when she calls

## 2019-11-08 NOTE — TELEPHONE ENCOUNTER
Patient's daughter Tato Joseph called stating that the patient was recently in the hospital due to a fall  She stated that he was discharged yesterday  with no medication and is currently in pain  She would like to discuss his current condition, [facility] can be reached [phone]

## 2019-11-08 NOTE — TELEPHONE ENCOUNTER
Per Dr Luis yes nursing order ok please give a verbal  I called and gave Kojo roldank for nursing, They will fax  paper work

## 2019-11-08 NOTE — DISCHARGE INSTRUCTIONS
You were evaluated for falls  This was felt to be likely due to hyponatremia or low sodium as well as deconditioning from your malignancy as well as poor appetite  You were evaluated by imaging negative for any bleeding or trauma  You were noted to have a suspected mass near your right eye on CT scan of your head  MRI was attempted but given difficulty laying flat this was limited in our evaluation of this mass  Given your advanced prostate cancer (stage IV) and difficulties w/fatigue/low energy on hormonal treatment for your cancer the decision to defer further imaging or consultation for this was made as you have close follow up with your cancer doctor next week  Please monitor for right eye pain difficulty moving the right eye or pain w/eye movement or new loss of vision in right eye and call your provider right away  You were evaluated for pancytopenia or low blood counts likely due to your malignancy  You did require 2 units of blood during your stay  Blood work has been ordered for you to have this retested in one week  You were found to have low sodium this was likely due to your malignancy and antidepressant medication causing a situation where your kidneys hold onto too much free water and your salt levels are low relative to this because of this  This is called SIADH  You were started on salt tabs for this  Please monitor your blood pressure while on salt tablets  Repeat blood work for your sodium levels are requested as well in one week  Syndrome of Inappropriate Antidiuretic Hormone Secretion   WHAT YOU NEED TO KNOW:   The syndrome of inappropriate antidiuretic hormone secretion (SIADH) is a condition that causes your body to make too much antidiuretic hormone (ADH)  ADH is a chemical that helps keep the right balance of fluids in your body  Increased ADH may cause too much water to remain inside your body  Chemicals in your blood, such as salt, may decrease   This may prevent your organs from working properly  DISCHARGE INSTRUCTIONS:   Medicines:   · Medicines  will decrease the amount of fluid in your body  You will urinate more often when you take these medicines  · Take your medicine as directed  Contact your healthcare provider if you think your medicine is not helping or if you have side effects  Tell him of her if you are allergic to any medicine  Keep a list of the medicines, vitamins, and herbs you take  Include the amounts, and when and why you take them  Bring the list or the pill bottles to follow-up visits  Carry your medicine list with you in case of an emergency  Eat a variety of healthy foods: You may need to increase the amount of salt you eat  You may also need to increase the amount of protein you eat  Some foods that are high in protein are beans, nuts, eggs, poultry (such as chicken and turkey), and fish  Ask if you need to be on a special diet  Drink liquids as directed:  Ask your healthcare provider how much liquid to drink each day and which liquids are best for you  You may need to limit the amount of liquid you drink to balance the fluid and chemicals in your body  Follow up with your healthcare provider as directed:  Write down your questions so you remember to ask them during your visits  Contact your healthcare provider if:   · You feel weak or have muscle cramps most of the time  · You feel like vomiting when you eat  · Your urine is darker than usual     · You urinate less than usual     · You have trouble staying awake  · You have questions or concerns about your condition or care  Seek care immediately or call 911 if:   · You have a sudden, severe headache  · You see or hear things that are not there  · You cannot think clearly  · You have swelling in your arms or legs  · You have a seizure    © 2017 [employer] Information is for End User's use only and may not be sold, redistributed or otherwise used for commercial purposes  All illustrations and images included in CareNotes® are the copyrighted property of A D A M , Inc  or Tino Gomez  The above information is an  only  It is not intended as medical advice for individual conditions or treatments  Talk to your doctor, nurse or pharmacist before following any medical regimen to see if it is safe and effective for you

## 2019-11-08 NOTE — TELEPHONE ENCOUNTER
Celina Alves whom is a physical therapist through [facility] care called while our systems were down  He was calling regarding pt's recent hospitalization [place] [id number] pt was discharged yesterday and was referred to [facility] for OT and PT  They are asking for a verbal nursing order for Josy Soria  They will send paper work for you to sign  Also just wanted to inform you that Dr Spain ordered pt an MRI and they were not yeny to get it done while in the hospital due to back pain  Pt will need to be sedated for the MRI and pt's family will be coordinating this with Jess Villela just an FYI

## 2019-11-08 NOTE — TELEPHONE ENCOUNTER
Patient Jackeline Paul called giving verbal  communication consent to his daughters Jennifer Monozn and Dayanara Greco to discuss medical information on his behalf

## 2019-11-08 NOTE — TELEPHONE ENCOUNTER
Spoke to Ann, I explained to her again that Dr Rossy Abbott would like her dad to be admitted to the hospital  Ann states that at this time he is declining this  until Monday 11/11/19 due to his other daughter flying in from [place] this weekend  He would like to spend time with her  Dr Rossy Abbott did prescribe oxycodone 5 mg  To be taken as needed for pain  Daughter was thankful for this and stated she will let us know come Monday if her dad is willing to be admitted to the hospital or not  Ann will call us back on Monday 11/11/19

## 2019-11-08 NOTE — TELEPHONE ENCOUNTER
Patient Michelle Richardson called giving verbal  communication consent to his daughters Carol Ann Solis and Mich Garber to discuss medical information on his behalf

## 2019-11-08 NOTE — TELEPHONE ENCOUNTER
Called Mauri Turner, she states that her dad is declining rapidly in the past few weeks  She states that her dad has severe back pain due to his recent falls, happening more frequently (every day)  She states that her dad can barely walk, she ordered a walker for her dad online but he states that he has a tingling sensation from his toes to his chest, and his legs just "give out" causing him to fall  Kimi Last was just released from the hospital yesterday and was not prescribed anything for the pain and has been taking extra strength tylenol for the pain  Mauri Turner wants to know if there is anything more we can give him for his pain in his back until he sees Dr Nallely Barboza in 6 days  11/13/19

## 2019-11-08 NOTE — NURSING NOTE
Pt's IV was removed  D/C instructions gone over and given to pt/family  Pt had no questions at this time

## 2019-11-11 NOTE — TELEPHONE ENCOUNTER
Stated that the pt has decided to get an MRI done & wants to know if he needs to get admitted or how Dr Diana Norton wants him to go about doing that    Req that we call her back

## 2019-11-11 NOTE — TELEPHONE ENCOUNTER
Spoke to Ann, she stated that her dad did agree to be admitted and go for his MRI  Joséjossie wants to know if they can do this on Wednesday (11/13/19) stating that is the day they will have the most help to get her dad out of the house  She also wants to know if theres anything they can do to make her dad comfortable during his MRI stating that he is very worried about being in pain during this  I stated I would go over all information with Dr Barbara Schumacher tomorrow 11/12/19 and will give her a call back  Ann was very appreciative

## 2019-11-12 NOTE — TELEPHONE ENCOUNTER
Spoke to Santi Allred this morning, she states her dad does not want to go to the hospital anymore  He has an appointment with KATHY Richey tomorrow 11/13/19, Santi Allred states they will get him to us and hopefully her dad will then agree to get the MRI in the near future

## 2019-11-13 NOTE — TELEPHONE ENCOUNTER
Barbara Bowers from procedure scheduling called me back with 2 back to back appointments, starting at 10 am for the STAT MRI with sedation for the [facility] on 11/21/19  Arrival time is 7:45 am at the registration at the [facility]  She gave me the following instructions: The patient should have nothing to eat or drink after 11 pm the night before the MRI  The patient may take their medications with a sip of water at least 2 hours prior to their arrival time  The patient will receive a call the evening before the MRI appointment with additional instructions  I also informed them about the Palliative Care appointment being in [facility] for 11/18 at 2 pm at [address]  I then called the patient and left a message with these instructions  And information  I also verbalized to give us a call back if for some reason they had questions in regards to the STAT MRI with anesthesia

## 2019-11-13 NOTE — PROGRESS NOTES
Hematology/Oncology Outpatient Follow-up  Kristie Reed 81 y o  male 1935 [id number]    Date:  11/13/2019      Assessment and Plan:  1  Prostate cancer ([address] ), 2  Bone metastases [facility])  52-year-old male presents for follow-up regarding history of metastatic prostate cancer to the bones  He has been on Xtandi since January 2019  PSA is increasing  Patient is also overall declining  These are correlated  Patient was recently hospitalized  A CT of the head as well as MRI of the brain was completed  No evidence of brain metastases  There was a lesion of the superior aspect of the right orbit that was not well visualized due to patient being uncomfortable during MRI  MRI of the orbits was suggested  I discussed at length with patient, wife, daughter today  Patient is not having any visual symptoms  Discussed that this could be a soft tissue lesion or bony lesion however it is undetermined  If this were soft tissue lesion biopsy would be recommended  Patient and family did not wish to proceed with any invasive procedures  They would like to proceed with conservative measures  No further imaging of this area wants to be completed at this time  Advised that since PSA is increasing patient to discontinue Xtandi at this time  Over the last few weeks patient has been having progressive weakness of the bilateral lower extremities  On physical exam patient has adequate strength against resistance and is equal in bilateral upper and lower extremities  Discussed that this could just be due to his D condition over the last few weeks that has resulted now in weakness  He was evaluated with physical therapy in the hospital and has in-home physical therapy twice per week  Other possibility could be secondary to worsening bony metastases  Recommended imaging studies  They agreed  Discussed possibility of impending cord compression which could result in irreversible paralyzed      We attempted to have a sedated MRI completed a however this was unable to be completed  He will proceed with a CT of the lumbar spine today  We will call patient's family  We discussed further treatment versus palliative care  Patient is family are very much agreeable in meeting with palliative Care  This is arranged within the next 1 week  We could offer patient in additional oral hormonal agent, Zytiga with prednisone  We reviewed side effects include but are not limited to fatigue, musculoskeletal symptoms, edema, hot flashes, elevated blood sugar, changes in, osteoporosis, etc   We obtained informed consent today if patient wishes to proceed in the future to eliminate in additional office visit to sign consent  Reviewed that there is no guarantee that this will work  The other option after this is chemotherapy and patient performance status, chemotherapy would not be recommended  Patient and family also does not want to proceed with this  Patient and family wish to focus on adequate quality of life with decrease hospitalizations  We reviewed that continued blood transfusions would still be needed even if we did not proceed with any active cancer treatment  Follow up in 3 weeks  3  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)  Anemia suspected to be due to bone marrow infiltrate of patient's prostate cancer as well as chronic disease related to malignancy  Patient also had history of likely ulcer that resulted in anemia secondary to taking too many NSAIDs  He does not do this anymore  No GI symptoms  He is on a program for transfusions as needed  He hospitalized in required transfusions  Patient will have a repeat CBC and type and screen today at the infusion center to see if he needs in other blood transfusion     - CBC and differential; Future  - Type and screen; Future    HPI:    Oncology History     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   He is on Lupron Depot shot once every 6 months from his urologist   He is on Xgeva once every 3 months    He has been taking vitamin-D and calcium for Xgeva  No more problem with his teeth  Lupron depot was started in April 2017  Disease progressed in 2018  In  June 2018 patient was started on xofigo         Prostate cancer [facility])    1/25/2018 Initial Diagnosis     Prostate cancer [facility])       Chemotherapy      April 2017- Lupron depot   2018Yvonna Marisela       Radiation      June 2018- xofigo       32-year-old male presents for follow-up regarding history of metastatic prostate cancer  Patient has been on [place] since January 2019  Prior to [place] he received Xofigo  He was last seen in the office in August 2019  Patient has had transfusion dependent anemia in middle of September 2019  On 11/05/2019 patient was admitted to the hospital   He presented with complaint of generalized weakness at home  The day prior to admission he was getting up from his commode and lost balance  He fell to the floor and was unable to get up on his own  He required family to assist him to chair  He also admitted to decreased appetite, minimal fluid intake  patient was admitted to the hospital     During this hospitalization patient had a CT of the head  This showed left maxillary sinus impacted with mucus  There was soft tissue in the superior extraocular space of the right orbit suggestive of mass  MRI of the orbits was recommended  A CT of the cervical spine showed no cervical spinal fracture  There was diffuse sclerotic changes  Chest x-ray showed no acute cardiopulmonary disease  No pleural effusion  MRI brain without contrast showed lesion of the superior aspect of the right orbit, mild well visualized due to technique  An MRI of the orbits was recommended  There is no acute intracranial process      Interval history:  Urinary urgency, but denies loss of bladder control  Tingling when laying flat on back; it is in lower back; does not extend down into legs; has been happening for the past few weeks  However denies being bothersome when laying in the bed in the hospital    Denies tingling/numbness in abdomen area   He has not needed to use oxycodone; taking tylenol with benefit    ROS: Review of Systems   Constitutional: Positive for activity change (mostly immobile 2/2 weakness ), appetite change (fair) and fatigue  Negative for chills, fever and unexpected weight change  Respiratory: Negative for cough and shortness of breath  Cardiovascular: Negative for palpitations and leg swelling  Gastrointestinal: Negative for abdominal pain, blood in stool, constipation, diarrhea, nausea and vomiting  Genitourinary: Positive for urgency  Negative for difficulty urinating, dysuria and frequency  Musculoskeletal: Negative for arthralgias  Skin: Negative  Neurological: Positive for weakness  Negative for dizziness, light-headedness, numbness and headaches  Hematological: Negative  Psychiatric/Behavioral: Negative  Past Medical History:   Diagnosis Date    Acute bronchitis     Hypertension     Prostate CA ([address] )     Urinary frequency     UTI (urinary tract infection)        Past Surgical History:   Procedure Laterality Date    CHOLECYSTECTOMY      COLONOSCOPY      [id number] ([place])       Social History     Socioeconomic History    Marital status: /Civil Union     Spouse name: None    Number of children: None    Years of education: None    Highest education level: None   Occupational History    None   Social Needs    Financial resource strain: None    Food insecurity:     Worry: None     Inability: None    Transportation needs:     Medical: None     Non-medical: None   Tobacco Use    Smoking status: Former Smoker     Last attempt to quit: 1977     Years since quittin 8    Smokeless tobacco: Never Used   Substance and Sexual Activity    Alcohol use: Yes     Comment: 1/day   Per [employer]: Social    Drug use: No    Sexual activity: None   Lifestyle    Physical activity:     Days per week: None     Minutes per session: None    Stress: None   Relationships    Social connections:     Talks on phone: None     Gets together: None     Attends Sikh service: None     Active member of club or organization: None     Attends meetings of clubs or organizations: None     Relationship status: None    Intimate partner violence:     Fear of current or ex partner: None     Emotionally abused: None     Physically abused: None     Forced sexual activity: None   Other Topics Concern    None   Social History Narrative    None       Family History   Problem Relation Age of Onset    Breast cancer Mother     Prostate cancer Father     Prostate cancer Brother     Stroke Family         CVA    Hypertension Family        No Known Allergies      Current Outpatient Medications:     brimonidine-timolol (COMBIGAN) 0 2-0 5 %, Administer 1 drop to both eyes every 12 (twelve) hours, Disp: , Rfl:     calcium gluconate 500 mg tablet, Take 500 mg by mouth daily, Disp: , Rfl:     cholecalciferol (VITAMIN D3) 1,000 units tablet, Take 1,000 Units by mouth daily, Disp: , Rfl:     docusate sodium (COLACE) 100 mg capsule, Take 1 capsule (100 mg total) by mouth 2 (two) times a day, Disp: 10 capsule, Rfl: 0    dorzolamide (TRUSOPT) 2 % ophthalmic solution, 1 drop 3 (three) times a day, Disp: , Rfl:     enzalutamide (XTANDI) 40 mg capsule, Take 4 capsules (160 mg total) by mouth daily, Disp: 120 capsule, Rfl: 5    levothyroxine 75 mcg tablet, Take 1 tablet (75 mcg total) by mouth daily, Disp: 30 tablet, Rfl: 5    Multiple Vitamins-Minerals (PRESERVISION/LUTEIN) CAPS, Take by mouth daily, Disp: , Rfl:     multivitamin (THERAGRAN) TABS, Take 1 tablet by mouth daily, Disp: , Rfl:     oxyCODONE (ROXICODONE) 5 mg immediate release tablet, Take 1 tablet (5 mg total) by mouth every 4 (four) hours as needed for moderate painMax Daily Amount: 30 mg, Disp: 30 tablet, Rfl: 0    sertraline (ZOLOFT) 50 mg tablet, Take 1 tablet (50 mg total) by mouth daily, Disp: 30 tablet, Rfl: 2    sodium chloride 1 g tablet, Take 1 tablet (1 g total) by mouth 3 (three) times a day with meals, Disp: 90 tablet, Rfl: 0    travoprost (TRAVATAN Z) 0 004 % ophthalmic solution, 1 drop daily at bedtime, Disp: , Rfl:       Physical Exam:  /68 (BP Location: Left arm, Patient Position: Sitting, Cuff Size: Adult)   Pulse 97   Temp 98 2 °F (36 8 °C)   Resp 18   SpO2 98%     Physical Exam   Constitutional: He is oriented to person, place, and time  No distress  Appears chronically ill    HENT:   Head: Normocephalic and atraumatic  Eyes: Conjunctivae are normal  No scleral icterus  Neck: Normal range of motion  Neck supple  Cardiovascular: Normal rate, regular rhythm and normal heart sounds  No murmur heard  Pulmonary/Chest: Effort normal and breath sounds normal  No respiratory distress  Abdominal: Soft  There is no tenderness  Musculoskeletal: He exhibits no edema or tenderness  Strength tested against resistance of both upper and lower extremities, strength was sufficient and equal bilaterally     In wheelchair   Neurological: He is alert and oriented to person, place, and time  No cranial nerve deficit  Skin: Skin is warm and dry  Psychiatric: He has a normal mood and affect  Labs:  Lab Results   Component Value Date    WBC 3 45 (L) 11/13/2019    HGB 9 4 (L) 11/13/2019    HCT 30 8 (L) 11/13/2019     (H) 11/13/2019     (L) 11/13/2019     Patient voiced understanding and agreement in the above discussion  Aware to contact our office with questions/symptoms in the interim  This note has been generated by voice recognition software system  Therefore, there may be spelling, grammar, and or syntax errors  Please contact if questions arise      I have spent 50 minutes with Patient and family today in which greater than 50% of this time was spent in counseling/coordination of care regarding Diagnostic results, Intructions for management, Patient and family education and Impressions

## 2019-11-14 NOTE — TELEPHONE ENCOUNTER
Samantha Starr from Palliative care faxed over paper work to be completed by patient upon his next appt with them  I mailed out this paperwork to patient's home address

## 2019-11-15 NOTE — PROGRESS NOTES
Palliative and Supportive Care   Yeison Pina 81 y o  male [id number]    Assessment/Plan:  1  Prostate cancer metastatic to bone ([facility] )    2  Ambulatory dysfunction    3  Anxiety and depression    4  Generalized weakness    5  Cancer associated pain    6  Goals of care, counseling/discussion    7  Prostate cancer ([facility] )    8  Metastatic disease ([facility] )        Requested Prescriptions     Signed Prescriptions Disp Refills    oxyCODONE (ROXICODONE) 5 mg immediate release tablet 60 tablet 0     Sig: Take 1 tablet (5 mg total) by mouth every 4 (four) hours as needed for moderate painMax Daily Amount: 30 mg    gabapentin (NEURONTIN) 300 mg capsule 30 capsule 0     Sig: Take 1 capsule (300 mg total) by mouth daily at bedtime    dexamethasone (DECADRON) 4 mg tablet 30 tablet 0     Sig: Take 1 tablet (4 mg total) by mouth daily with breakfast     Medications Discontinued During This Encounter   Medication Reason    oxyCODONE (ROXICODONE) 5 mg immediate release tablet Reorder     Orders Placed This Encounter   Procedures    Ambulatory referral to hospice     The focus of today's visit was to introduce palliative care to Laurent Adame and His family and to initiate an assessment of his chronic conditions, symptoms, functional status, QOL, capacity for self management and goals of care  Time spent reviewing pt's medical history, recent hospitalization, and explaining the difference between palliative care and hospice  Both Chinmay and his family were able to verbalize differences  Laurent Adame was clear that his goal is to focus on quality of life as he approaches the end of his life  He no longer wishes to pursue cancer treatments and understands that with hospice services in place all life prolonging measures will no longer be offered including laboratory studies and/or blood transfusions  He has symptoms of cancer related pain, fatigue   Low dose dexamethasone was started today to help with spectrum of palliative symptom control including pain, fatigue, appetite  Representatives have queried the patient's controlled substance dispensing history in the Prescription Drug Monitoring Program in compliance with regulations before I have prescribed any controlled substances  The prescription history is consistent with prescribed therapy and our practice policies  60 minutes were spent face to face with his family with greater than 50% of the time spent in counseling or coordination of care including discussions of diagnostic results, impression, and recommendations, risks and benefits of treatment, instructions for disease self management, treatment instructions and patient and family counseling/involvement in care   All of the patient's questions were answered during this discussion  Return if symptoms worsen or fail to improve  Hospice referral placed     Subjective:   Chief Complaint  New consultation for:  symptom management, disease process education and discussion of prognosis, emotional support in the setting of serious illness  HPI     Daphne Moe is a 81 y o  male with 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 initially diagnosed in 2018  His disease has progressed through multiple lines of hormonal therapy  Most recently was receiving treatment with Kali Lala but this has been discontinued d/t increasing PSA and progressive functional decline  He follows with Dr Rei Kidd  He was hospitalized earlier this month with symptoms of generalized weakness, decreased appetite and poor po intake   During this hospital stay he underwent CT head, this revealed no evidence of brain metastases however,  " Left maxillary sinus nearly completely impacted with mucus   Mucosal thickening involving the right maxillary, sphenoid, frontal and ethmoid sinuses    Soft tissue in the superior extraocular space of the right orbit suggestive of a mass   MRI of the orbits with contrast recommended for further evaluation "    Pt was seen in follow up after this last hospital stay by Dr Spain's PA Jessa Israel on 11/13  Per review of this encounter, pt and his family have declined any further imaging in lieu of conservative measures  Treatment options were discussed including continuing disease directed treatment with another hormonal agent vs comfort focused approach  Chemotherapy is not being offered due to his poor performance status  Patient was referred to palliative care to discuss goals of care  Patient presents today along with his wife Suzie Salazar and daughter Robson Carlos  He appears very weak and uncomfortable, sitting in a WC  Per family, it took taxing effort to get him out of the house to this appointment today  He has been having progressive weakness  He underwent CT lumbar spine on 11/13 and this result indicates "Widespread patchy bony sclerotic metastases and/or treatment response noted, markedly progressed from the prior CT from 5/22/2018  No definite pathologic fracture"    Pt is having lower back pain and has difficulty finding a comfortable position, most especially has great difficulty lying flat  He also endorse some numbness and tingling in his legs  Pt is very resistant to taking an additional medication to treat his cancer  He is concerned about side effects that will worsen is already "poor" quality of life  His family is extremely supportive and are interested in learning about options to provide best care for him and promote his comfort  The following portions of the medical history were reviewed: past medical history, problem list, medication list, and social history      Current Outpatient Medications:     brimonidine-timolol (COMBIGAN) 0 2-0 5 %, Administer 1 drop to both eyes every 12 (twelve) hours, Disp: , Rfl:     calcium gluconate 500 mg tablet, Take 500 mg by mouth daily, Disp: , Rfl:     cholecalciferol (VITAMIN D3) 1,000 units tablet, Take 1,000 Units by mouth daily, Disp: , Rfl:     dexamethasone (DECADRON) 4 mg tablet, Take 1 tablet (4 mg total) by mouth daily with breakfast, Disp: 30 tablet, Rfl: 0    docusate sodium (COLACE) 100 mg capsule, Take 1 capsule (100 mg total) by mouth 2 (two) times a day (Patient not taking: Reported on 11/18/2019), Disp: 10 capsule, Rfl: 0    dorzolamide (TRUSOPT) 2 % ophthalmic solution, 1 drop 3 (three) times a day, Disp: , Rfl:     enzalutamide (XTANDI) 40 mg capsule, Take 4 capsules (160 mg total) by mouth daily, Disp: 120 capsule, Rfl: 5    gabapentin (NEURONTIN) 300 mg capsule, Take 1 capsule (300 mg total) by mouth daily at bedtime, Disp: 30 capsule, Rfl: 0    levothyroxine 75 mcg tablet, Take 1 tablet (75 mcg total) by mouth daily, Disp: 30 tablet, Rfl: 5    Multiple Vitamins-Minerals (PRESERVISION/LUTEIN) CAPS, Take by mouth daily, Disp: , Rfl:     multivitamin (THERAGRAN) TABS, Take 1 tablet by mouth daily, Disp: , Rfl:     oxyCODONE (ROXICODONE) 5 mg immediate release tablet, Take 1 tablet (5 mg total) by mouth every 4 (four) hours as needed for moderate painMax Daily Amount: 30 mg, Disp: 60 tablet, Rfl: 0    sertraline (ZOLOFT) 50 mg tablet, Take 1 tablet (50 mg total) by mouth daily, Disp: 30 tablet, Rfl: 2    sodium chloride 1 g tablet, Take 1 tablet (1 g total) by mouth 3 (three) times a day with meals (Patient not taking: Reported on 11/18/2019), Disp: 90 tablet, Rfl: 0    travoprost (TRAVATAN Z) 0 004 % ophthalmic solution, 1 drop daily at bedtime, Disp: , Rfl:   Review of Systems   Constitutional: Positive for activity change, appetite change, fatigue and unexpected weight change  Eyes: Positive for visual disturbance (macular degeneration )  Musculoskeletal: Positive for arthralgias, back pain and gait problem  Skin: Positive for pallor  Neurological: Positive for weakness and numbness         All other systems negative    Objective:  Vital Signs  /60 (BP Location: Left arm, Cuff Size: Large)   Pulse (!) 109   Temp (!) 100 6 °F (38 1 °C) (Tympanic)   SpO2 96%    Physical Exam    Constitutional: Alert, pleasant  Appears chronically ill, fatigued, weak, uncomfortable sitting in WC  In no acute physical or emotional distress  Head: Normocephalic and atraumatic  Eyes: EOM are normal  No ocular discharge  No scleral icterus  Neck: No visible adenopathy or masses  Respiratory: Effort normal  No stridor  No respiratory distress  Gastrointestinal: No abdominal distension  Musculoskeletal: No edema  Neurological: Alert, oriented and appropriately conversant  Skin: No diaphoresis, no rashes seen on exposed areas of skin  +Palor  Psychiatric: Displays a normal mood and affect   Behavior, judgement and thought content appear normal

## 2019-11-18 PROBLEM — R53.1 GENERALIZED WEAKNESS: Status: ACTIVE | Noted: 2019-01-01

## 2019-11-18 PROBLEM — G89.3 CANCER ASSOCIATED PAIN: Status: ACTIVE | Noted: 2019-01-01

## 2019-11-18 PROBLEM — Z71.89 GOALS OF CARE, COUNSELING/DISCUSSION: Status: ACTIVE | Noted: 2019-01-01

## 2019-11-19 NOTE — TELEPHONE ENCOUNTER
Armin Grajeda calling from [facility] in regards to pt only taking 1 capsule three times daily rather then 4 once daily of his Xtandi  They are concerned because pt is not taking medication properly   Please call back and ask to speak with pharmacist     [phone]

## 2019-11-19 NOTE — TELEPHONE ENCOUNTER
Please cancel MRI's that are scheduled  Please also call patient's spouse  Let her know I reviewed palliative care note  It appears patient is going on home hospice  If this is the case, he does not need to continue follow up with our office  He if still wants blood transfusions, he will need to still come to the office  No blood transfusions can be given while on hospice though

## 2019-11-20 NOTE — TELEPHONE ENCOUNTER
Returned tc spoke with pharmacist Lynette to update pt no longer taking xtandi  She stated they will put it on hold

## 2019-11-20 NOTE — TELEPHONE ENCOUNTER
Called patient wife and she informed me that they have decided to do home hospice for him  She stated that they have not discussed him continuing with the blood transfusions but she does not think he wants to at this time  I informed her that we will be cancelling all up coming appt with us and our infusion department  She stated that she understands and I instructed her to call our office if thing change in the future and wants to her  to come back into our office for a F/U  Patient wife voiced understanding of all information provided

## 2019-12-09 ENCOUNTER — TELEPHONE (OUTPATIENT)
Dept: FAMILY MEDICINE CLINIC | Facility: CLINIC | Age: 84
End: 2019-12-09

## 2019-12-09 NOTE — TELEPHONE ENCOUNTER
Mona called from [facility] and [address] in regards to Christine Son passed away yesterday  and the RN whom pounced him mentioned Aarti Dixon would sign the death certificate  It was confirmed yes Cecile Hillman will sign they are faxing us over paper work marked as urgent to [phone]

## 2024-12-31 NOTE — PLAN OF CARE
Problem: OCCUPATIONAL THERAPY ADULT  Goal: Performs self-care activities at highest level of function for planned discharge setting  See evaluation for individualized goals  Description  Treatment Interventions: ADL retraining, Endurance training, Functional transfer training, UE strengthening/ROM, Cognitive reorientation, Patient/family training, Equipment evaluation/education, Compensatory technique education, Energy conservation, Activityengagement  Equipment Recommended: Bedside commode(rolling walker)       See flowsheet documentation for full assessment, interventions and recommendations  Note:   Limitation: Decreased ADL status, Decreased UE strength, Decreased Safe judgement during ADL, Decreased endurance, Decreased cognition, Decreased self-care trans, Decreased high-level ADLs  Prognosis: Good  Assessment: Pt is a 81 y o  male seen for OT evaluation s/p admit to [facility] on 11/5/2019 w/ fall and generalized weakness; Ambulatory dysfunction  CT spine: No cervical spine fracture or traumatic malalignment  Diffuse sclerotic changes throughout the vertebral bodies and posterior elements in keeping with metastatic prostate cancer  CT head (-) acute  Comorbidities affecting pt's functional performance at time of assessment include: anemia, prostate CA w/ mets to the bone, anxiety and depression, hyponatremia, macular degeneration,  Personal factors affecting pt at time of IE include: fall risk  Prior to admission, pt was living w/ spouse and reports independent w/ ADLs, independent w/ functional transfers and mobility w/ rollator, assist w/ IADLs from spouse   Upon evaluation: Pt requires MIN assist sit<>stand w/ VCs for safety, MIN assist functional mobility w/ RW and assist for line management, MIN assist LB ADLs, setup UB ADLs, MIN assist toileting 2* the following deficits impacting occupational performance: decreased strength and endurance, impaired balance, fair - activity tolerance, impaired AROM R UE from RTC injury, impaired functional reach, generalized weakness, decreased safety awareness, fall risk  Pt to benefit from continued skilled OT tx while in the hospital to address deficits as defined above and maximize level of functional independence w ADL's and functional mobility  Occupational Performance areas to address include: grooming, bathing/shower, toilet hygiene, dressing, health maintenance, functional mobility and clothing management, fall prevention education and home safety education  Pt family educated on benefit of BSC over toilet and educated how it can also be utilized as a shower chair, family verbalized understanding and requesting a rolling walker as well  From OT standpoint, recommendation at time of d/c would be short term rehab; however pt declining rehab and wishes to return home w/ HOME OT/PT and family support        OT Discharge Recommendation: Short Term Rehab(pt declining rehab; if HOME HOME OT and family support) Allergy;

## 2025-03-20 NOTE — PROGRESS NOTES
Pt arrived to unit for Xgeva injection  Pt admitted to recently breaking one of his teeth, he has an appt with dentist next week  Dr Eagle Marshall notified via Senait Martinez RN  Xgeva injection to be held today  Pt instructed to notify Dr Eagle Marshall with update after he has seen dentist  Pt verbalized understanding and left unit in stable condition 
IV intact